# Patient Record
Sex: MALE | Race: WHITE | Employment: UNEMPLOYED | URBAN - METROPOLITAN AREA
[De-identification: names, ages, dates, MRNs, and addresses within clinical notes are randomized per-mention and may not be internally consistent; named-entity substitution may affect disease eponyms.]

---

## 2019-11-14 ENCOUNTER — HOSPITAL ENCOUNTER (INPATIENT)
Facility: CLINIC | Age: 29
LOS: 5 days | Discharge: HOME | End: 2019-11-19
Attending: FAMILY MEDICINE | Admitting: PSYCHIATRY & NEUROLOGY
Payer: MEDICAID

## 2019-11-14 DIAGNOSIS — F19.139 SUBSTANCE ABUSE WITHDRAWAL WITH COMPLICATION (H): Primary | ICD-10-CM

## 2019-11-14 DIAGNOSIS — F11.93 OPIOID WITHDRAWAL (H): ICD-10-CM

## 2019-11-14 LAB
ALBUMIN SERPL-MCNC: 3.6 G/DL (ref 3.4–5)
ALP SERPL-CCNC: 80 U/L (ref 40–150)
ALT SERPL W P-5'-P-CCNC: 18 U/L (ref 0–70)
AMPHETAMINES UR QL SCN: NEGATIVE
ANION GAP SERPL CALCULATED.3IONS-SCNC: 4 MMOL/L (ref 3–14)
APTT PPP: 30 SEC (ref 22–37)
AST SERPL W P-5'-P-CCNC: 11 U/L (ref 0–45)
BARBITURATES UR QL: NEGATIVE
BASOPHILS # BLD AUTO: 0 10E9/L (ref 0–0.2)
BASOPHILS NFR BLD AUTO: 0.2 %
BENZODIAZ UR QL: NEGATIVE
BILIRUB SERPL-MCNC: 0.3 MG/DL (ref 0.2–1.3)
BUN SERPL-MCNC: 15 MG/DL (ref 7–30)
CALCIUM SERPL-MCNC: 9 MG/DL (ref 8.5–10.1)
CANNABINOIDS UR QL SCN: POSITIVE
CHLORIDE SERPL-SCNC: 106 MMOL/L (ref 94–109)
CO2 SERPL-SCNC: 32 MMOL/L (ref 20–32)
COCAINE UR QL: NEGATIVE
CREAT SERPL-MCNC: 0.61 MG/DL (ref 0.66–1.25)
DIFFERENTIAL METHOD BLD: NORMAL
EOSINOPHIL # BLD AUTO: 0.1 10E9/L (ref 0–0.7)
EOSINOPHIL NFR BLD AUTO: 0.5 %
ERYTHROCYTE [DISTWIDTH] IN BLOOD BY AUTOMATED COUNT: 12.9 % (ref 10–15)
ETHANOL UR QL SCN: NEGATIVE
GFR SERPL CREATININE-BSD FRML MDRD: >90 ML/MIN/{1.73_M2}
GGT SERPL-CCNC: 17 U/L (ref 0–75)
GLUCOSE SERPL-MCNC: 106 MG/DL (ref 70–99)
HCT VFR BLD AUTO: 42.7 % (ref 40–53)
HGB BLD-MCNC: 13.7 G/DL (ref 13.3–17.7)
IMM GRANULOCYTES # BLD: 0 10E9/L (ref 0–0.4)
IMM GRANULOCYTES NFR BLD: 0.2 %
INR PPP: 1.11 (ref 0.86–1.14)
LYMPHOCYTES # BLD AUTO: 2.9 10E9/L (ref 0.8–5.3)
LYMPHOCYTES NFR BLD AUTO: 28.1 %
MCH RBC QN AUTO: 28.1 PG (ref 26.5–33)
MCHC RBC AUTO-ENTMCNC: 32.1 G/DL (ref 31.5–36.5)
MCV RBC AUTO: 88 FL (ref 78–100)
MONOCYTES # BLD AUTO: 0.9 10E9/L (ref 0–1.3)
MONOCYTES NFR BLD AUTO: 8.9 %
NEUTROPHILS # BLD AUTO: 6.4 10E9/L (ref 1.6–8.3)
NEUTROPHILS NFR BLD AUTO: 62.1 %
NRBC # BLD AUTO: 0 10*3/UL
NRBC BLD AUTO-RTO: 0 /100
OPIATES UR QL SCN: POSITIVE
PLATELET # BLD AUTO: 291 10E9/L (ref 150–450)
POTASSIUM SERPL-SCNC: 4.7 MMOL/L (ref 3.4–5.3)
PROT SERPL-MCNC: 7.2 G/DL (ref 6.8–8.8)
RBC # BLD AUTO: 4.87 10E12/L (ref 4.4–5.9)
SODIUM SERPL-SCNC: 142 MMOL/L (ref 133–144)
TSH SERPL DL<=0.005 MIU/L-ACNC: 0.96 MU/L (ref 0.4–4)
WBC # BLD AUTO: 10.3 10E9/L (ref 4–11)

## 2019-11-14 PROCEDURE — 12800008 ZZH R&B CD ADULT

## 2019-11-14 PROCEDURE — G0499 HEPB SCREEN HIGH RISK INDIV: HCPCS | Performed by: FAMILY MEDICINE

## 2019-11-14 PROCEDURE — 80053 COMPREHEN METABOLIC PANEL: CPT | Performed by: FAMILY MEDICINE

## 2019-11-14 PROCEDURE — 80307 DRUG TEST PRSMV CHEM ANLYZR: CPT | Performed by: FAMILY MEDICINE

## 2019-11-14 PROCEDURE — 86803 HEPATITIS C AB TEST: CPT | Performed by: FAMILY MEDICINE

## 2019-11-14 PROCEDURE — 25000132 ZZH RX MED GY IP 250 OP 250 PS 637: Performed by: FAMILY MEDICINE

## 2019-11-14 PROCEDURE — HZ2ZZZZ DETOXIFICATION SERVICES FOR SUBSTANCE ABUSE TREATMENT: ICD-10-PCS | Performed by: PSYCHIATRY & NEUROLOGY

## 2019-11-14 PROCEDURE — 99285 EMERGENCY DEPT VISIT HI MDM: CPT | Mod: Z6 | Performed by: FAMILY MEDICINE

## 2019-11-14 PROCEDURE — 86706 HEP B SURFACE ANTIBODY: CPT | Performed by: FAMILY MEDICINE

## 2019-11-14 PROCEDURE — 87389 HIV-1 AG W/HIV-1&-2 AB AG IA: CPT | Performed by: FAMILY MEDICINE

## 2019-11-14 PROCEDURE — 85610 PROTHROMBIN TIME: CPT | Performed by: FAMILY MEDICINE

## 2019-11-14 PROCEDURE — 99285 EMERGENCY DEPT VISIT HI MDM: CPT | Mod: 25 | Performed by: FAMILY MEDICINE

## 2019-11-14 PROCEDURE — 82977 ASSAY OF GGT: CPT | Performed by: FAMILY MEDICINE

## 2019-11-14 PROCEDURE — 85730 THROMBOPLASTIN TIME PARTIAL: CPT | Performed by: FAMILY MEDICINE

## 2019-11-14 PROCEDURE — 80320 DRUG SCREEN QUANTALCOHOLS: CPT | Performed by: FAMILY MEDICINE

## 2019-11-14 PROCEDURE — 85025 COMPLETE CBC W/AUTO DIFF WBC: CPT | Performed by: FAMILY MEDICINE

## 2019-11-14 PROCEDURE — 84443 ASSAY THYROID STIM HORMONE: CPT | Performed by: FAMILY MEDICINE

## 2019-11-14 RX ORDER — BUPRENORPHINE 2 MG/1
4 TABLET SUBLINGUAL
Status: COMPLETED | OUTPATIENT
Start: 2019-11-14 | End: 2019-11-15

## 2019-11-14 RX ORDER — ONDANSETRON 4 MG/1
4 TABLET, ORALLY DISINTEGRATING ORAL EVERY 6 HOURS PRN
Status: DISCONTINUED | OUTPATIENT
Start: 2019-11-14 | End: 2019-11-19 | Stop reason: HOSPADM

## 2019-11-14 RX ORDER — NICOTINE 21 MG/24HR
1 PATCH, TRANSDERMAL 24 HOURS TRANSDERMAL DAILY
Status: DISCONTINUED | OUTPATIENT
Start: 2019-11-15 | End: 2019-11-19 | Stop reason: HOSPADM

## 2019-11-14 RX ORDER — NICOTINE 21 MG/24HR
1 PATCH, TRANSDERMAL 24 HOURS TRANSDERMAL ONCE
Status: COMPLETED | OUTPATIENT
Start: 2019-11-14 | End: 2019-11-14

## 2019-11-14 RX ORDER — HYDROXYZINE HYDROCHLORIDE 25 MG/1
25 TABLET, FILM COATED ORAL EVERY 4 HOURS PRN
Status: DISCONTINUED | OUTPATIENT
Start: 2019-11-14 | End: 2019-11-19 | Stop reason: HOSPADM

## 2019-11-14 RX ORDER — ALUMINA, MAGNESIA, AND SIMETHICONE 2400; 2400; 240 MG/30ML; MG/30ML; MG/30ML
30 SUSPENSION ORAL EVERY 4 HOURS PRN
Status: DISCONTINUED | OUTPATIENT
Start: 2019-11-14 | End: 2019-11-19 | Stop reason: HOSPADM

## 2019-11-14 RX ORDER — BISACODYL 10 MG
10 SUPPOSITORY, RECTAL RECTAL DAILY PRN
Status: DISCONTINUED | OUTPATIENT
Start: 2019-11-14 | End: 2019-11-19 | Stop reason: HOSPADM

## 2019-11-14 RX ORDER — TRAZODONE HYDROCHLORIDE 50 MG/1
50 TABLET, FILM COATED ORAL
Status: DISCONTINUED | OUTPATIENT
Start: 2019-11-14 | End: 2019-11-19 | Stop reason: HOSPADM

## 2019-11-14 RX ORDER — IBUPROFEN 600 MG/1
600 TABLET, FILM COATED ORAL EVERY 6 HOURS PRN
Status: DISCONTINUED | OUTPATIENT
Start: 2019-11-14 | End: 2019-11-19 | Stop reason: HOSPADM

## 2019-11-14 RX ORDER — CLONIDINE HYDROCHLORIDE 0.1 MG/1
0.1 TABLET ORAL 4 TIMES DAILY PRN
Status: DISCONTINUED | OUTPATIENT
Start: 2019-11-14 | End: 2019-11-19 | Stop reason: HOSPADM

## 2019-11-14 RX ORDER — ACETAMINOPHEN 325 MG/1
650 TABLET ORAL EVERY 4 HOURS PRN
Status: DISCONTINUED | OUTPATIENT
Start: 2019-11-14 | End: 2019-11-19 | Stop reason: HOSPADM

## 2019-11-14 RX ADMIN — NICOTINE 1 PATCH: 21 PATCH, EXTENDED RELEASE TRANSDERMAL at 15:45

## 2019-11-14 ASSESSMENT — ACTIVITIES OF DAILY LIVING (ADL)
RETIRED_COMMUNICATION: 0-->UNDERSTANDS/COMMUNICATES WITHOUT DIFFICULTY
TRANSFERRING: 0-->INDEPENDENT
COGNITION: 0 - NO COGNITION ISSUES REPORTED
SWALLOWING: 0-->SWALLOWS FOODS/LIQUIDS WITHOUT DIFFICULTY
RETIRED_EATING: 0-->INDEPENDENT
BATHING: 0-->INDEPENDENT
DRESS: 0-->INDEPENDENT
TOILETING: 0-->INDEPENDENT
NUMBER_OF_TIMES_PATIENT_HAS_FALLEN_WITHIN_LAST_SIX_MONTHS: 1
AMBULATION: 0-->INDEPENDENT
FALL_HISTORY_WITHIN_LAST_SIX_MONTHS: YES

## 2019-11-14 ASSESSMENT — ENCOUNTER SYMPTOMS
BLOOD IN STOOL: 0
DIARRHEA: 1
FEVER: 0
COLOR CHANGE: 1
CONFUSION: 0
ACTIVITY CHANGE: 1
DYSPHORIC MOOD: 0
ABDOMINAL PAIN: 0
BRUISES/BLEEDS EASILY: 0
RHINORRHEA: 1
HALLUCINATIONS: 0
SHORTNESS OF BREATH: 0
DECREASED CONCENTRATION: 0
APPETITE CHANGE: 1
NAUSEA: 1

## 2019-11-14 NOTE — ED NOTES
ED to Behavioral Floor Handoff    SITUATION  Bebeto Alvarado is a 28 year old male who speaks English and lives in a home with family members The patient arrived in the ED by private car from home with a complaint of Addiction Problem (Pt had a bed on hold for detox. Intake has been notified pt is in ED. Pt uses Heroin IV 1/2 gram daily. Last use: last night at midnight. )  .The patient's current symptoms started/worsened 3 month(s) ago and during this time the symptoms have increased.   In the ED, pt was diagnosed with   Final diagnoses:   None        Initial vitals were: BP: 122/55  Pulse: 87  Temp: 98.3  F (36.8  C)  Resp: 16  Weight: 68.9 kg (151 lb 12.8 oz)  SpO2: 97 %   --------  Is the patient diabetic? No   If yes, last blood glucose? --     If yes, was this treated in the ED? --  --------  Is the patient inebriated (ETOH) No or Impaired on other substances? No  MSSA done? N/A  Last MSSA score: --    Were withdrawal symptoms treated? N/A  Does the patient have a seizure history? No. If yes, date of most recent seizure--  --------  Is the patient patient experiencing suicidal ideation? denies current or recent suicidal ideation     Homicidal ideation? denies current or recent homicidal ideation or behaviors.    Self-injurious behavior/urges? denies current or recent self injurious behavior or ideation.  ------  Was pt aggressive in the ED No  Was a code called No  Is the pt now cooperative? Yes  -------  Meds given in ED:   Medications   nicotine (NICODERM CQ) 21 MG/24HR 24 hr patch 1 patch (1 patch Transdermal Given 11/14/19 4197)      Family present during ED course? No  Family currently present? No    BACKGROUND  Does the patient have a cognitive impairment or developmental disability? No  Allergies:   Allergies   Allergen Reactions     Codeine Unknown     Pt has been told.   .   Social demographics are   Social History     Socioeconomic History     Marital status: Single     Spouse name: None      Number of children: None     Years of education: None     Highest education level: None   Occupational History     None   Social Needs     Financial resource strain: None     Food insecurity:     Worry: None     Inability: None     Transportation needs:     Medical: None     Non-medical: None   Tobacco Use     Smoking status: Current Every Day Smoker     Smokeless tobacco: Never Used   Substance and Sexual Activity     Alcohol use: Not Currently     Drug use: Yes     Types: IV, Opiates, Marijuana     Sexual activity: None   Lifestyle     Physical activity:     Days per week: None     Minutes per session: None     Stress: None   Relationships     Social connections:     Talks on phone: None     Gets together: None     Attends Taoism service: None     Active member of club or organization: None     Attends meetings of clubs or organizations: None     Relationship status: None     Intimate partner violence:     Fear of current or ex partner: None     Emotionally abused: None     Physically abused: None     Forced sexual activity: None   Other Topics Concern     None   Social History Narrative     None        ASSESSMENT  Labs results   Labs Ordered and Resulted from Time of ED Arrival Up to the Time of Departure from the ED   DRUG ABUSE SCREEN 6 CHEM DEP URINE ([facility]) - Abnormal; Notable for the following components:       Result Value    Cannabinoids Qual Urine Positive (*)     Opiates Qualitative Urine Positive (*)     All other components within normal limits   COMPREHENSIVE METABOLIC PANEL - Abnormal; Notable for the following components:    Glucose 106 (*)     Creatinine 0.61 (*)     All other components within normal limits   CBC WITH PLATELETS DIFFERENTIAL   PARTIAL THROMBOPLASTIN TIME   INR   TSH      Imaging Studies: No results found for this or any previous visit (from the past 24 hour(s)).   Most recent vital signs /55   Pulse 87   Temp 98.3  F (36.8  C) (Oral)   Resp 16   Wt 68.9 kg (151 lb 12.8  oz)   SpO2 97%    Abnormal labs/tests/findings requiring intervention:---   Pain control: fair  Nausea control: good    RECOMMENDATION  Are any infection precautions needed (MRSA, VRE, etc.)? No If yes, what infection? --  ---  Does the patient have mobility issues? independently. If yes, what device does the pt use? ---  ---  Is patient on 72 hour hold or commitment? No If on 72 hour hold, have hold and rights been given to patient? N/A  Are admitting orders written if after 10 p.m. ?N/A  Tasks needing to be completed:---     Melisa Nascimento RN   ascom-- n/a   [phone] [place] [facility]   [phone] [facility]

## 2019-11-14 NOTE — ED PROVIDER NOTES
"    [facility] EMERGENCY DEPARTMENT ([facility])    11/14/19     ED 12 1:12 PM   History     Chief Complaint   Patient presents with     Addiction Problem     Pt had a bed on hold for detox. Intake has been notified pt is in ED. Pt uses Heroin IV 1/2 gram daily. Last use: last night at midnight.      The history is provided by the patient, medical records and a parent.     Bebeto Alvarado is a 28 year old male who presents seeking heroin detox. Mother states he went to [facility] a year ago, had heroin detox there. He states they accused him of going to different places, accused him of using and denies that any of this happened. He relapsed as soon as he got out of [place] and has been using 1/2 a gram of heroin IV daily, last used last night.  He has been injecting in the veins of his antecubital forearms.  He denies any active infections, redness or swelling though has some old bumps over prior infected sites. He is withdrawing and feeling \"like crap\" right now with nausea, goosebumps, diarrhea, generalized malaise. He has had cough. He smokes cigarettes as well, though no other substances. He is otherwise healthy. No other physical symptoms at this time.  Patient has bed on hold for detox.     I have reviewed the Medications, Allergies, Past Medical and Surgical History, and Social History in the [employer] system.  History reviewed. No pertinent past medical history.    History reviewed. No pertinent surgical history.    History reviewed. No pertinent family history.    Social History     Tobacco Use     Smoking status: Current Every Day Smoker     Smokeless tobacco: Never Used   Substance Use Topics     Alcohol use: Not Currently      Review of Systems   Constitutional: Positive for activity change and appetite change. Negative for fever.        Generalized malaise   HENT: Positive for rhinorrhea.    Respiratory: Negative for shortness of breath.    Cardiovascular: Negative for chest pain. " "  Gastrointestinal: Positive for diarrhea and nausea. Negative for abdominal pain and blood in stool.   Skin: Positive for color change. Negative for rash.        Bilateral antecubital sites injection noted without concerns of infection   Hematological: Does not bruise/bleed easily.   Psychiatric/Behavioral: Negative for confusion, decreased concentration, dysphoric mood, hallucinations and suicidal ideas.   All other systems reviewed and are negative.      Physical Exam   BP: 122/55  Pulse: 87  Temp: 98.3  F (36.8  C)  Resp: 16  Height: 170.2 cm (5' 7\")  Weight: 68.9 kg (151 lb 12.8 oz)  SpO2: 97 %      Physical Exam  Vitals signs and nursing note reviewed.   Constitutional:       General: He is in acute distress.      Appearance: He is well-developed. He is not diaphoretic.      Comments: Patient here with mother is cooperative otherwise slightly uncomfortable but does not appear toxic.   HENT:      Head: Normocephalic and atraumatic.      Nose: Rhinorrhea present.   Eyes:      General: No scleral icterus.     Extraocular Movements: Extraocular movements intact.      Conjunctiva/sclera: Conjunctivae normal.      Pupils: Pupils are equal, round, and reactive to light.   Neck:      Musculoskeletal: Normal range of motion and neck supple.   Cardiovascular:      Heart sounds: Normal heart sounds.   Abdominal:      General: There is no distension.      Palpations: There is no mass.      Tenderness: There is no abdominal tenderness.   Skin:     General: Skin is warm and dry.      Capillary Refill: Capillary refill takes less than 2 seconds.      Findings: No rash.      Comments: Bilateral antecubital injection sites with just slight sclerosis of the veins but no cellulitis abscess fluctuance etc.   Neurological:      General: No focal deficit present.      Mental Status: He is alert and oriented to person, place, and time.   Psychiatric:      Comments: Mildly flattened otherwise appropriate         ED Course   Patient " valuate here in the ER drug screen positive for opiates and THC.  Otherwise been voluntary no other eventually did this point.  We do a bed for patient to be admitted to 3 a detox for opiate dependence with withdrawal.     Procedures    Results for orders placed or performed during the hospital encounter of 11/14/19 (from the past 24 hour(s))   Drug abuse screen 6 urine (tox)   Result Value Ref Range    Amphetamine Qual Urine Negative NEG^Negative    Barbiturates Qual Urine Negative NEG^Negative    Benzodiazepine Qual Urine Negative NEG^Negative    Cannabinoids Qual Urine Positive (A) NEG^Negative    Cocaine Qual Urine Negative NEG^Negative    Ethanol Qual Urine Negative NEG^Negative    Opiates Qualitative Urine Positive (A) NEG^Negative   CBC with platelets differential   Result Value Ref Range    WBC 10.3 4.0 - 11.0 10e9/L    RBC Count 4.87 4.4 - 5.9 10e12/L    Hemoglobin 13.7 13.3 - 17.7 g/dL    Hematocrit 42.7 40.0 - 53.0 %    MCV 88 78 - 100 fl    MCH 28.1 26.5 - 33.0 pg    MCHC 32.1 31.5 - 36.5 g/dL    RDW 12.9 10.0 - 15.0 %    Platelet Count 291 150 - 450 10e9/L    Diff Method Automated Method     % Neutrophils 62.1 %    % Lymphocytes 28.1 %    % Monocytes 8.9 %    % Eosinophils 0.5 %    % Basophils 0.2 %    % Immature Granulocytes 0.2 %    Nucleated RBCs 0 0 /100    Absolute Neutrophil 6.4 1.6 - 8.3 10e9/L    Absolute Lymphocytes 2.9 0.8 - 5.3 10e9/L    Absolute Monocytes 0.9 0.0 - 1.3 10e9/L    Absolute Eosinophils 0.1 0.0 - 0.7 10e9/L    Absolute Basophils 0.0 0.0 - 0.2 10e9/L    Abs Immature Granulocytes 0.0 0 - 0.4 10e9/L    Absolute Nucleated RBC 0.0    Partial thromboplastin time   Result Value Ref Range    PTT 30 22 - 37 sec   INR   Result Value Ref Range    INR 1.11 0.86 - 1.14   Comprehensive metabolic panel   Result Value Ref Range    Sodium 142 133 - 144 mmol/L    Potassium 4.7 3.4 - 5.3 mmol/L    Chloride 106 94 - 109 mmol/L    Carbon Dioxide 32 20 - 32 mmol/L    Anion Gap 4 3 - 14 mmol/L     Glucose 106 (H) 70 - 99 mg/dL    Urea Nitrogen 15 7 - 30 mg/dL    Creatinine 0.61 (L) 0.66 - 1.25 mg/dL    GFR Estimate >90 >60 mL/min/[1.73_m2]    GFR Estimate If Black >90 >60 mL/min/[1.73_m2]    Calcium 9.0 8.5 - 10.1 mg/dL    Bilirubin Total 0.3 0.2 - 1.3 mg/dL    Albumin 3.6 3.4 - 5.0 g/dL    Protein Total 7.2 6.8 - 8.8 g/dL    Alkaline Phosphatase 80 40 - 150 U/L    ALT 18 0 - 70 U/L    AST 11 0 - 45 U/L   TSH   Result Value Ref Range    TSH 0.96 0.40 - 4.00 mU/L   GGT   Result Value Ref Range    GGT 17 0 - 75 U/L     Medications   hydrOXYzine (ATARAX) tablet 25 mg (has no administration in time range)   nicotine Patch in Place (has no administration in time range)   nicotine patch REMOVAL (has no administration in time range)   nicotine (NICODERM CQ) 21 MG/24HR 24 hr patch 1 patch (has no administration in time range)   acetaminophen (TYLENOL) tablet 650 mg (has no administration in time range)   alum & mag hydroxide-simethicone (MYLANTA ES/MAALOX  ES) suspension 30 mL (has no administration in time range)   magnesium hydroxide (MILK OF MAGNESIA) suspension 30 mL (has no administration in time range)   bisacodyl (DULCOLAX) Suppository 10 mg (has no administration in time range)   traZODone (DESYREL) tablet 50 mg (has no administration in time range)   ibuprofen (ADVIL/MOTRIN) tablet 600 mg (has no administration in time range)   ondansetron (ZOFRAN-ODT) ODT tab 4 mg (has no administration in time range)   cloNIDine (CATAPRES) tablet 0.1 mg (has no administration in time range)   buprenorphine (SUBUTEX) sublingual tablet 4 mg (has no administration in time range)   nicotine (NICODERM CQ) 21 MG/24HR 24 hr patch 1 patch (1 patch Transdermal Given 11/14/19 1325)         Assessments & Plan (with Medical Decision Making)  28-year-old male history of IV heroin use presents the ER for detox.  Patient uses half a gram a day.  Went through treatment a year ago but was not successful.  Patient with mother.  Patient  seeking detox for opiate withdrawal.  Last used last night.  Patient has some mild withdrawal symptoms this point otherwise cooperative not suicidal homicidal not delusional will be admitted to 3 a detox.         I have reviewed the nursing notes.    I have reviewed the findings, diagnosis, plan and need for follow up with the patient.    There are no discharge medications for this patient.      Final diagnoses:   None     I, Yolanda Park, am serving as a trained medical scribe to document services personally performed by Kwame Blake MD based on the provider's statements to me on November 14, 2019.  This document has been checked and approved by the attending provider.    I, Kwame Blake MD, was physically present and have reviewed and verified the accuracy of this note documented by Yolanda Park, medical scribe.     11/14/2019   [facility], [place], EMERGENCY DEPARTMENT    This note was created at least in part by the use of dragon voice dictation system. Inadvertent typographical errors may still exist.  Kwame Blake MD.         Kwame Blake MD  11/14/19 1942

## 2019-11-14 NOTE — PROGRESS NOTES
11/14/19 1649   Patient Belongings   Did you bring any home meds/supplements to the hospital?  Yes   Disposition of meds  Sent to security/pharmacy per site process   Patient Belongings other (see comments)  (storage bin, discharge bin, security)   Belongings Search Yes   Clothing Search Yes   Second Staff Time, Oscar   storage bin: jacket, cap, belt, cigarettes, 2 lighters, clothing items  Discharge bin: cell phone, wallet (no cash)  [id number]: social security card, [place], visa, [place]card  [id number]  A               Admission:  I am responsible for any personal items that are not sent to the safe or pharmacy.  [facility] is not responsible for loss, theft or damage of any property in my possession.    Signature:  _________________________________ Date: _______  Time: _____                                              Staff Signature:  ____________________________ Date: ________  Time: _____      2nd Staff person, if patient is unable/unwilling to sign:    Signature: ________________________________ Date: ________  Time: _____     Discharge:  [facility] has returned all of my personal belongings:    Signature: _________________________________ Date: ________  Time: _____                                          Staff Signature:  ____________________________ Date: ________  Time: _____

## 2019-11-14 NOTE — PHARMACY-ADMISSION MEDICATION HISTORY
Admission medication history interview status for the 11/14/2019 admission is complete. See Epic admission navigator for allergy information, pharmacy, prior to admission medications and immunization status.     Medication history interview sources:  patient, chart review, Care Everywhere review    Changes made to PTA medication list (reason)  Added: none  Deleted: none  Changed: none    Additional medication history information (including reliability of information, actions taken by pharmacist):  -Patient reports taking no prescription meds, OTC meds, PRN meds, supplements, vitamins, etc.  -Patient reports buying Suboxone off the street, but has not taken any today.    Prior to Admission medications    Not on File     Medication history completed by: Jasmin Tovar, PharmD, BCPS

## 2019-11-15 LAB
CHOLEST SERPL-MCNC: 168 MG/DL
HBV SURFACE AB SERPL IA-ACNC: 2.57 M[IU]/ML
HBV SURFACE AG SERPL QL IA: NONREACTIVE
HCV AB SERPL QL IA: NONREACTIVE
HDLC SERPL-MCNC: 36 MG/DL
HIV 1+2 AB+HIV1 P24 AG SERPL QL IA: NONREACTIVE
LDLC SERPL CALC-MCNC: 110 MG/DL
NONHDLC SERPL-MCNC: 132 MG/DL
TRIGL SERPL-MCNC: 109 MG/DL

## 2019-11-15 PROCEDURE — 99207 ZZC CONSULT E&M CHANGED TO INITIAL LEVEL: CPT | Performed by: PHYSICIAN ASSISTANT

## 2019-11-15 PROCEDURE — 99222 1ST HOSP IP/OBS MODERATE 55: CPT | Mod: AI | Performed by: PSYCHIATRY & NEUROLOGY

## 2019-11-15 PROCEDURE — H2032 ACTIVITY THERAPY, PER 15 MIN: HCPCS

## 2019-11-15 PROCEDURE — 99221 1ST HOSP IP/OBS SF/LOW 40: CPT | Performed by: PHYSICIAN ASSISTANT

## 2019-11-15 PROCEDURE — 86706 HEP B SURFACE ANTIBODY: CPT | Performed by: PHYSICIAN ASSISTANT

## 2019-11-15 PROCEDURE — 80061 LIPID PANEL: CPT | Performed by: PSYCHIATRY & NEUROLOGY

## 2019-11-15 PROCEDURE — 36415 COLL VENOUS BLD VENIPUNCTURE: CPT | Performed by: PSYCHIATRY & NEUROLOGY

## 2019-11-15 PROCEDURE — 25000132 ZZH RX MED GY IP 250 OP 250 PS 637: Performed by: NURSE PRACTITIONER

## 2019-11-15 PROCEDURE — 12800008 ZZH R&B CD ADULT

## 2019-11-15 PROCEDURE — 25000132 ZZH RX MED GY IP 250 OP 250 PS 637: Performed by: PSYCHIATRY & NEUROLOGY

## 2019-11-15 RX ORDER — NALOXONE HYDROCHLORIDE 0.4 MG/ML
.1-.4 INJECTION, SOLUTION INTRAMUSCULAR; INTRAVENOUS; SUBCUTANEOUS
Status: DISCONTINUED | OUTPATIENT
Start: 2019-11-15 | End: 2019-11-19 | Stop reason: HOSPADM

## 2019-11-15 RX ORDER — BUPRENORPHINE 2 MG/1
4 TABLET SUBLINGUAL 2 TIMES DAILY
Status: DISCONTINUED | OUTPATIENT
Start: 2019-11-15 | End: 2019-11-19 | Stop reason: HOSPADM

## 2019-11-15 RX ADMIN — NICOTINE 1 PATCH: 21 PATCH, EXTENDED RELEASE TRANSDERMAL at 09:17

## 2019-11-15 RX ADMIN — CLONIDINE HYDROCHLORIDE 0.1 MG: 0.1 TABLET ORAL at 16:50

## 2019-11-15 RX ADMIN — HYDROXYZINE HYDROCHLORIDE 25 MG: 25 TABLET, FILM COATED ORAL at 16:50

## 2019-11-15 RX ADMIN — BUPRENORPHINE HCL 4 MG: 2 TABLET SUBLINGUAL at 12:37

## 2019-11-15 RX ADMIN — IBUPROFEN 600 MG: 600 TABLET ORAL at 16:50

## 2019-11-15 RX ADMIN — BUPRENORPHINE HCL 4 MG: 2 TABLET SUBLINGUAL at 20:22

## 2019-11-15 ASSESSMENT — ACTIVITIES OF DAILY LIVING (ADL)
DRESS: INDEPENDENT
HYGIENE/GROOMING: INDEPENDENT
ORAL_HYGIENE: INDEPENDENT

## 2019-11-15 NOTE — PLAN OF CARE
Continues in detox status on opiate withdrawal protocol. Cows scores 2 and 9. Buprenorphine started per order. Spent most of the day in lounge socializing with peers. Appetite good. Denies suicide ideation and thoughts of self harm.

## 2019-11-15 NOTE — PLAN OF CARE
"DAVIN Tate \"Tavo\" Christiano 28/M, arrived 16:30 from [place] ED    S = Situation:   Voluntary admit for opiate withdrawal     B  = Background:     Pt states using 0.5-1 gram heroine IV daily. Pt stated dosage depended on how much money he had at the time of purchase. Last use was \"early morning\" on 11/14    Pt injects into Left and Right forearms antecubital.      This is Pt's first admit for detox. Pt was at [facility] October 2019 for treatment.     Alcohol use: occasional, UTOX negative  THC use: occasional, last use on 11/13/19, UTOX positive    A  =  Assessment:   COWS = 1 upon arrival, VS stable upon arrival    Pt denies SI, SIB; calm, pleasant and cooperative with search and intake    No signs of infection at injection sites.    Allergy: Codeine, pharmacy stated OK to order subutex; Pt had suboxone film in belongings, stated not having a prescription for it, states taking it when he needed it      R =   Request or Recommendation:     COWS assessment, withdrawal precautions    Subutex 4 mg ordered: Once PRN for COWS of 9 or greater  Ibuprofen 600 mg PRN  Zofran PRN  Clonidine 0.1 PRN  Comfort meds    "

## 2019-11-15 NOTE — H&P
"[facility], [place]   Psychiatric History and Physical  Admission date: 11/14/2019        Chief Complaint:   \"I'm okay.\"         HPI:     The patient is a 27yo male with a history of opiate use disorder who was admitted to detoxify from heroin. Has been on Suboxone before and is hopeful to return to maintenance. Says that his last use was between \"10 and noon\" yesterday and is feeling some withdrawal. Mood is \"a little depressed.\" Denies SI or HI. Denies AH or VH. Sleeping well and denies nausea or vomiting. Hopeful for [facility] or \"anywhere.\"      Per ER: Bebeto Alvarado is a 28 year old male who presents seeking heroin detox. Mother states he went to [facility] a year ago, had heroin detox there. He states they accused him of going to different places, accused him of using and denies that any of this happened. He relapsed as soon as he got out of [place] and has been using 1/2 a gram of heroin IV daily, last used last night.  He has been injecting in the veins of his antecubital forearms.  He denies any active infections, redness or swelling though has some old bumps over prior infected sites. He is withdrawing and feeling \"like crap\" right now with nausea, goosebumps, diarrhea, generalized malaise. He has had cough. He smokes cigarettes as well, though no other substances. He is otherwise healthy. No other physical symptoms at this time.  Patient has bed on hold for detox.            Past Psychiatric History:     No history of suicide attempts.         Substance Use and History:     Opiate use disorder.   First detox. Was at [facility] in October 2018.         Past Medical History:   PAST MEDICAL HISTORY: History reviewed. No pertinent past medical history.    PAST SURGICAL HISTORY: History reviewed. No pertinent surgical history.          Family History:   FAMILY HISTORY: History reviewed. No pertinent family history.        Social History:   Please see the full psychosocial " profile from the clinical treatment coordinator.   SOCIAL HISTORY:   Social History     Tobacco Use     Smoking status: Current Every Day Smoker     Smokeless tobacco: Never Used   Substance Use Topics     Alcohol use: Not Currently            Physical ROS:   The 10-point review of systems was negative except as noted in HPI.         PTA Medications:     No medications prior to admission.          Allergies:     Allergies   Allergen Reactions     Codeine Unknown     Pt has been told.          Labs:     Recent Results (from the past 48 hour(s))   Drug abuse screen 6 urine (tox)    Collection Time: 11/14/19  1:06 PM   Result Value Ref Range    Amphetamine Qual Urine Negative NEG^Negative    Barbiturates Qual Urine Negative NEG^Negative    Benzodiazepine Qual Urine Negative NEG^Negative    Cannabinoids Qual Urine Positive (A) NEG^Negative    Cocaine Qual Urine Negative NEG^Negative    Ethanol Qual Urine Negative NEG^Negative    Opiates Qualitative Urine Positive (A) NEG^Negative   CBC with platelets differential    Collection Time: 11/14/19  1:32 PM   Result Value Ref Range    WBC 10.3 4.0 - 11.0 10e9/L    RBC Count 4.87 4.4 - 5.9 10e12/L    Hemoglobin 13.7 13.3 - 17.7 g/dL    Hematocrit 42.7 40.0 - 53.0 %    MCV 88 78 - 100 fl    MCH 28.1 26.5 - 33.0 pg    MCHC 32.1 31.5 - 36.5 g/dL    RDW 12.9 10.0 - 15.0 %    Platelet Count 291 150 - 450 10e9/L    Diff Method Automated Method     % Neutrophils 62.1 %    % Lymphocytes 28.1 %    % Monocytes 8.9 %    % Eosinophils 0.5 %    % Basophils 0.2 %    % Immature Granulocytes 0.2 %    Nucleated RBCs 0 0 /100    Absolute Neutrophil 6.4 1.6 - 8.3 10e9/L    Absolute Lymphocytes 2.9 0.8 - 5.3 10e9/L    Absolute Monocytes 0.9 0.0 - 1.3 10e9/L    Absolute Eosinophils 0.1 0.0 - 0.7 10e9/L    Absolute Basophils 0.0 0.0 - 0.2 10e9/L    Abs Immature Granulocytes 0.0 0 - 0.4 10e9/L    Absolute Nucleated RBC 0.0    Partial thromboplastin time    Collection Time: 11/14/19  1:32 PM   Result  "Value Ref Range    PTT 30 22 - 37 sec   INR    Collection Time: 11/14/19  1:32 PM   Result Value Ref Range    INR 1.11 0.86 - 1.14   Comprehensive metabolic panel    Collection Time: 11/14/19  1:32 PM   Result Value Ref Range    Sodium 142 133 - 144 mmol/L    Potassium 4.7 3.4 - 5.3 mmol/L    Chloride 106 94 - 109 mmol/L    Carbon Dioxide 32 20 - 32 mmol/L    Anion Gap 4 3 - 14 mmol/L    Glucose 106 (H) 70 - 99 mg/dL    Urea Nitrogen 15 7 - 30 mg/dL    Creatinine 0.61 (L) 0.66 - 1.25 mg/dL    GFR Estimate >90 >60 mL/min/[1.73_m2]    GFR Estimate If Black >90 >60 mL/min/[1.73_m2]    Calcium 9.0 8.5 - 10.1 mg/dL    Bilirubin Total 0.3 0.2 - 1.3 mg/dL    Albumin 3.6 3.4 - 5.0 g/dL    Protein Total 7.2 6.8 - 8.8 g/dL    Alkaline Phosphatase 80 40 - 150 U/L    ALT 18 0 - 70 U/L    AST 11 0 - 45 U/L   TSH    Collection Time: 11/14/19  1:32 PM   Result Value Ref Range    TSH 0.96 0.40 - 4.00 mU/L   GGT    Collection Time: 11/14/19  1:32 PM   Result Value Ref Range    GGT 17 0 - 75 U/L          Physical and Psychiatric Examination:     /65   Pulse 64   Temp 98.2  F (36.8  C) (Oral)   Resp 16   Ht 1.702 m (5' 7\")   Wt 68.9 kg (151 lb 12.8 oz)   SpO2 99%   BMI 23.78 kg/m    Weight is 151 lbs 12.8 oz  Body mass index is 23.78 kg/m .    Physical Exam:  I have reviewed the physical exam as documented by by the medical team and agree with findings and assessment and have no additional findings to add at this time.    Mental Status Exam:  Appearance: awake, alert and adequately groomed  Attitude:  cooperative  Eye Contact:  good  Mood:  depressed  Affect:  mood congruent  Speech:  clear, coherent  Language: fluent and intact in English  Psychomotor, Gait, Musculoskeletal:  no evidence of tardive dyskinesia, dystonia, or tics  Thought Process:  goal oriented  Associations:  no loose associations  Thought Content:  no evidence of suicidal ideation or homicidal ideation and no evidence of psychotic thought  Insight:  " fair  Judgement:  intact  Oriented to:  time, person, and place  Attention Span and Concentration:  intact  Recent and Remote Memory:  fair  Fund of Knowledge:  appropriate         Admission Diagnoses:   Opiate use disorder with withdrawal with unspecified complication         Assessment & Plan:     1) Subutex 4mg BID started after patient entered withdrawal. Will transition to Suboxone at discharge.     Disposition Plan   Reason for ongoing admission: requires detoxification from substance that poses a risk of bodily harm during withdrawal period  Discharge location: Chemical dependency treatment facility  Discharge Medications: not ordered  Follow-up Appointments: not scheduled  Legal Status: voluntary  Entered by: Yinka Winston on 11/15/2019 at 5:23 AM

## 2019-11-15 NOTE — PLAN OF CARE
Behavioral Team Discussion: (11/15/2019)    Continued Stay Criteria/Rationale: Patient admitted for opiate withdrawal and opiate Use Disorder.  Plan: The following services will be provided to the patient; psychiatric assessment, medication management, therapeutic milieu, individual and group support, and skills groups.   Participants: 3A Provider: Dr. Yinka Winston MD; 3A RN's: Sabi Null, RN; 3A CM's: Alina Perrin .  Summary/Recommendation: Providers will assess today for treatment recommendations, discharge planning, and aftercare plans. CM will meet with pt for discharge planning.   Medical/Physical: None noted  Precautions:   Behavioral Orders   Procedures     Code 1 - Restrict to Unit     Routine Programming     As clinically indicated     Status 15     Every 15 minutes.     Withdrawal precautions     Rationale for change in precautions or plan: N/A  Progress: No Change.

## 2019-11-15 NOTE — PROGRESS NOTES
Met with Pt to initiate discharge planning.  Pt is requesting referral to treatment and Suboxone maintenance.  Pt has no insurance and will require [facility] funding for treatment.  Business office notified and assistance with MA application requested.  Coached Pt to complete paperwork for assessment.  Pt acknowledged.  Weekend  to complete assessment and addendum.  Referral to [facility] made for screening.  Pt will have straight MA and once open may be referred to [place] [facility] for maintenance.

## 2019-11-15 NOTE — CONSULTS
Internal Medicine Initial Visit      Bebeto Alvarado MRN# [id number]   YOB: 1990 Age: 28 year old   Date of Admission: 11/14/2019  PCP: No Ref-Primary, Physician    Referring Provider: Behavioral Health - Yinka Winston MD  Reason for Visit: Evaluate for complications from heroin withdrawal         Assessment and Recommendations:   Bebeto Alvarado is a 28 year old male with a history of opiate dependence who is admitted to station 3[facility] with heroin detoxication. Internal Medicine consultation was ordered by Dr. Winston for evaluate for complications from heroin withdrawal.       Opiate use disorder with acute withdrawal  Cannabis use disorder:  Uses 1/2 to 1 gram of IV heroin daily. Last used 11/13. Injects into bilateral antecubital fossas. Does not share needles but does reuse. No s/sx of injection site infection or abscess. Previously went to [place] last year for treatment. Denies any other illicit drug use other than occasional marijuana use. Vital signs stable on admission. Labs reviewed including CBC and CMP without acute abnormality. GGT negative. TSH WNL.   - Management per psychiatry team.   - HIV, Hep C antibody and Hep B ab/antigen negative   - Please have patient set up appointment/establish care with PCP prior to discharge    Tobacco use:   Smokes 1/2 ppd for ~10 years.    - Encouraged cessation   - Nicotine supplementation prn     Abnormal lipid panel:   Cholesterol 168, , HDL 36 and Triglycerides 109. Unclear if fasting.   - Lifestyle modifications  - Patient will need follow-up fasting lipid panel in 2-4 weeks     No further medical intervention is required at this time. Medicine signing off. Please feel free to call with any questions.       Nallely Baez PA-C  Hospitalist Service   Pager: [phone]     Reason for Admission:   Heroin withdrawal         History of Present Illness:   History is obtained from the patient and medical record.     Bebeto  "Steven Alvarado is a 28 year old male with a history of opiate dependence who is admitted to station 3A with heroin detoxication. Internal Medicine consultation was ordered by Dr. Winston for evaluate for complications from heroin withdrawal.       Patient uses 1/2gram to 1 gram of IV heroin daily for the past year. Last use was yesterday morning. He injects into his bilateral antecubital fossas. No pain, erythema or warmth. He reuses needles occasionally but does not share. Currently he endorses some shakiness and diaphoresis. No fever or chills, chest pain, SOB, abdominal pain, N/V, dysuria, frequency, urgency, constipation, hematochezia, melena, diarrhea or concern for STD. He smokes marijuana occasionally but denies any other illicit drug use. He went to [place] last year for treatment, but was \"kicked out,\" after one week for something he states was not his fault. He is interested in going back to treatment.           Review of Systems:    ROS: 10 point ROS neg other than the symptoms noted above in the HPI.            Past Medical History:   Reviewed and updated in Epic.  Past Medical History:   Diagnosis Date     Opiate abuse, continuous (H)      Tobacco use    Denies any hx of HTN, DM, HLD or lung disease.           Past Surgical History:   Reviewed and updated in Epic.  History reviewed. No pertinent surgical history.          Social History:   Currently unemployed. Previously worked at [employer] up to one week ago. Is couch surfing and sleeping at friends and mother's house.   Social History     Tobacco Use     Smoking status: Current Every Day Smoker     Smokeless tobacco: Never Used   Substance Use Topics     Alcohol use: Not Currently     Drug use: Yes     Types: IV, Opiates, Marijuana             Family History:   Reviewed and updated in Epic.  Family History   Problem Relation Age of Onset     Diabetes Mother      Hypertension Mother      No Known Problems Father      No Known Problems Brother  " "            Allergies:     Allergies   Allergen Reactions     Codeine Unknown     Pt has been told.             Medications:     No medications prior to admission.        Current Facility-Administered Medications   Medication     acetaminophen (TYLENOL) tablet 650 mg     alum & mag hydroxide-simethicone (MYLANTA ES/MAALOX  ES) suspension 30 mL     bisacodyl (DULCOLAX) Suppository 10 mg     buprenorphine (SUBUTEX) sublingual tablet 4 mg     cloNIDine (CATAPRES) tablet 0.1 mg     hydrOXYzine (ATARAX) tablet 25 mg     ibuprofen (ADVIL/MOTRIN) tablet 600 mg     magnesium hydroxide (MILK OF MAGNESIA) suspension 30 mL     naloxone (NARCAN) injection 0.1-0.4 mg     nicotine (NICODERM CQ) 21 MG/24HR 24 hr patch 1 patch     nicotine Patch in Place     nicotine patch REMOVAL     ondansetron (ZOFRAN-ODT) ODT tab 4 mg     traZODone (DESYREL) tablet 50 mg            Physical Exam:   Blood pressure 138/71, pulse 74, temperature 97.6  F (36.4  C), temperature source Oral, resp. rate 16, height 1.702 m (5' 7\"), weight 68.9 kg (151 lb 12.8 oz), SpO2 100 %.  Body mass index is 23.78 kg/m .  GENERAL: Alert and oriented x 3. NAD. Pleasant and cooperative. Scabs to skin. No discharge.   HEENT: Anicteric sclera. Mucous membranes moist.   CV: RRR. S1, S2. No murmurs appreciated.   RESPIRATORY: Effort normal on room air. Lungs CTAB with no wheezing, rales, rhonchi.   GI: Abdomen soft, non distended, non tender. No guarding, rigidity or rebound tenderness.  MUSCULOSKELETAL: No joint swelling or tenderness.  NEUROLOGICAL: No focal deficits. Moves all extremities.   EXTREMITIES: No peripheral edema. Intact bilateral pedal pulses.   SKIN: No jaundice. Injection sites to bilateral antecubital fossas. There is no erythema or warmth or fluctuance. No abscess.           Data:   CBC:  Recent Labs   Lab Test 11/14/19  1332   WBC 10.3   RBC 4.87   HGB 13.7   HCT 42.7   MCV 88   MCH 28.1   MCHC 32.1   RDW 12.9          CMP:  Recent Labs   Lab " Test 11/14/19  1332      POTASSIUM 4.7   CHLORIDE 106   CHAKA 9.0   CO2 32   BUN 15   CR 0.61*   *   AST 11   ALT 18   BILITOTAL 0.3   ALBUMIN 3.6   PROTTOTAL 7.2   ALKPHOS 80       TSH:  TSH   Date Value Ref Range Status   11/14/2019 0.96 0.40 - 4.00 mU/L Final       Tox screen: positive for opiates and cannabinoids     Unresulted Labs Ordered in the Past 30 Days of this Admission     No orders found for last 31 day(s).

## 2019-11-16 PROCEDURE — H2035 A/D TX PROGRAM, PER HOUR: HCPCS | Mod: HQ

## 2019-11-16 PROCEDURE — 12800008 ZZH R&B CD ADULT

## 2019-11-16 PROCEDURE — 25000132 ZZH RX MED GY IP 250 OP 250 PS 637: Performed by: NURSE PRACTITIONER

## 2019-11-16 PROCEDURE — 25000132 ZZH RX MED GY IP 250 OP 250 PS 637: Performed by: PSYCHIATRY & NEUROLOGY

## 2019-11-16 RX ADMIN — BUPRENORPHINE HCL 4 MG: 2 TABLET SUBLINGUAL at 08:32

## 2019-11-16 RX ADMIN — BUPRENORPHINE HCL 4 MG: 2 TABLET SUBLINGUAL at 18:00

## 2019-11-16 RX ADMIN — TRAZODONE HYDROCHLORIDE 50 MG: 50 TABLET ORAL at 21:35

## 2019-11-16 RX ADMIN — CLONIDINE HYDROCHLORIDE 0.1 MG: 0.1 TABLET ORAL at 18:00

## 2019-11-16 RX ADMIN — NICOTINE 1 PATCH: 21 PATCH, EXTENDED RELEASE TRANSDERMAL at 08:31

## 2019-11-16 ASSESSMENT — ACTIVITIES OF DAILY LIVING (ADL)
ORAL_HYGIENE: INDEPENDENT
HYGIENE/GROOMING: INDEPENDENT
DRESS: INDEPENDENT

## 2019-11-16 NOTE — PROGRESS NOTES
GABRIELLA Evaluation Counselor met with pt and completed the Substance use evaluation. Writer sent an SBAR1 to [facility] intake admission pool to initiate the referral process.  Pt's funding source is [employer]

## 2019-11-16 NOTE — PROGRESS NOTES
Rule 25 Assessment  Background Information   1. Date of Assessment Request  2. Date of Assessment  11/16/2019 3. Date Service Authorized     4.   YOVANA Rodríguez   5.  Phone Number [phone] 6. Referent  Self 7. Assessment Site  [facility]     8. Client Name   Bebeto Alvarado 9. Date of Birth  1990 Age  28 year old 10. Gender  male  11. PMI/ Insurance No.  [id number]   12. Client's Primary Language:  English 13. Do you require special accommodations, such as an  or assistance with written material? No   14. Current Address: [address] [place] [address]   15. Client Phone Numbers: [phone] (home)      16. Tell me what has happened to bring you here today.     I am sick of living the way I am, using drugs everyday, living a better life and successful.    17. Have you had other rule 25 assessments?     No    DIMENSION I - Acute Intoxication /Withdrawal Potential   1. Chemical use most recent 12 months outside a facility and other significant use history (client self-report)              X = Primary Drug Used   Age of First Use Most Recent Pattern of Use and Duration   Need enough information to show pattern (both frequency and amounts) and to show tolerance for each chemical that has a diagnosis   Date of last use and time, if needed   Withdrawal Potential? Requiring special care Method of use  (oral, smoked, snort, IV, etc)      Alcohol     No use            Marijuana/  Hashish   No use          Cocaine/Crack     No use          Meth/  Amphetamines   No use          Heroin     21 Age [age] smoking 2-3 times a month  23- 27 started smoking 1/4-1/2 daily  28-IV use started 5 months ago 1/2-1 gram daily 11/14/19 at 10 am yes IV      Other Opiates/  Synthetics   No use          Inhalants     No use          Benzodiazepines     No use          Hallucinogens     No use          Barbiturates/  Sedatives/  Hypnotics No use          Over-the-Counter  Drugs   No use          Other     No use          Nicotine     15 A pack a day 18 no smoke     2. Do you use greater amounts of alcohol/other drugs to feel intoxicated or achieve the desired effect?  Yes.  Or use the same amount and get less of an effect?  No.  Example: The patient reported having increased use and tolerance issues with heroin.    3A. Have you ever been to detox?     No    3B. When was the first time?     The patient denied ever having a detoxification admission.    3C. How many times since then?     The patient denied ever having a detoxification admission.    3D. Date of most recent detox:     19    4.  Withdrawal symptoms: Have you had any of the following withdrawal symptoms?  Past 12 months Recent (past 30 days)   None Unable to Sleep  Sad / Depressed Feeling  Unable to Eat  Anxiety / Worried     's Visual Observations and Symptoms: No visible withdrawal symptoms at this time    Based on the above information, is withdrawal likely to require attention as part of treatment participation?  No    Dimension I Ratings   Acute intoxication/Withdrawal potential - The placing authority must use the criteria in Dimension I to determine a client s acute intoxication and withdrawal potential.    RISK DESCRIPTIONS - Severity ratin Client can tolerate and cope with withdrawal discomfort. The client displays mild to moderate intoxication or signs and symptoms interfering with daily functioning but does not immediately endanger self or others. Client poses minimal risk of severe withdrawal.    REASONS SEVERITY WAS ASSIGNED (What about the amount of the person s use and date of most recent use and history of withdrawal problems suggests the potential of withdrawal symptoms requiring professional assistance? )     Patient reports drug of choice is heroin, admits inability to abstain once use started. His last use of meth 18. He acknowledges some minor withdrawal symptoms in the  past 30 days but displays full functioning to participate in programming.         DIMENSION II - Biomedical Complications and Conditions   1a. Do you have any current health/medical conditions?(Include any infectious diseases, allergies, or chronic or acute pain, history of chronic conditions)       No    1b. On a scale of mild, moderate to severe please specify the severity of the patient's diabetes and/or neuropathy.    The patient denied having a history of being diagnosed with diabetes or neuropathy.    2. Do you have a health care provider? When was your most recent appointment? What concerns were identified?     The patient does not have a PCP at this time.    3. If indicated by answers to items 1 or 2: How do you deal with these concerns? Is that working for you? If you are not receiving care for this problem, why not?      The patient denied having any current clinical health issues.    4A. List current medication(s) including over-the-counter or herbal supplements--including pain management:     Current Facility-Administered Medications   Medication     acetaminophen (TYLENOL) tablet 650 mg     alum & mag hydroxide-simethicone (MYLANTA ES/MAALOX  ES) suspension 30 mL     bisacodyl (DULCOLAX) Suppository 10 mg     buprenorphine (SUBUTEX) sublingual tablet 4 mg     cloNIDine (CATAPRES) tablet 0.1 mg     hydrOXYzine (ATARAX) tablet 25 mg     ibuprofen (ADVIL/MOTRIN) tablet 600 mg     magnesium hydroxide (MILK OF MAGNESIA) suspension 30 mL     naloxone (NARCAN) injection 0.1-0.4 mg     nicotine (NICODERM CQ) 21 MG/24HR 24 hr patch 1 patch     nicotine Patch in Place     nicotine patch REMOVAL     ondansetron (ZOFRAN-ODT) ODT tab 4 mg     traZODone (DESYREL) tablet 50 mg       4B. Do you follow current medical recommendations/take medications as prescribed?     Yes    4C. When did you last take your medication?     Today.    4D. Do you need a referral to have a follow up with a primary care  physician?    No.    5. Has a health care provider/healer ever recommended that you reduce or quit alcohol/drug use?     No    6. Are you pregnant?     NA, because the patient is male    7. Have you had any injuries, assaults/violence towards you, accidents, health related issues, overdose(s) or hospitalizations related to your use of alcohol or other drugs:     No    8. Do you have any specific physical needs/accommodations? No    Dimension II Ratings   Biomedical Conditions and Complications - The placing authority must use the criteria in Dimension II to determine a client s biomedical conditions and complications.   RISK DESCRIPTIONS - Severity ratin Client displays full functioning with good ability to cope with physical discomfort.    REASONS SEVERITY WAS ASSIGNED (What physical/medical problems does this person have that would inhibit his or her ability to participate in treatment? What issues does he or she have that require assistance to address?)    Patient presents with no current medical conditions, has no primary care provider but is able to access medical care if needed.          DIMENSION III - Emotional, Behavioral, Cognitive Conditions and Complications   1. (Optional) Tell me what it was like growing up in your family. (substance use, mental health, discipline, abuse, support)     Grew up a solid family, parents  when I was 7 years, but remains very supportive to my needs. No drugs use or mental health issue the family. Pt has one older brother, no problem reports with him.     2. When was the last time that you had significant problems...  A. with feeling very trapped, lonely, sad, blue, depressed or hopeless  about the future? Past Month    B. with sleep trouble, such as bad dreams, sleeping restlessly, or falling  asleep during the day? Past Month    C. with feeling very anxious, nervous, tense, scared, panicked, or like  something bad was going to happen? Past Month    D. with  becoming very distressed and upset when something reminded  you of the past? 2 - 12 months ago    E. with thinking about ending your life or committing suicide? Never    3. When was the last time that you did the following things two or more times?  A. Lied or conned to get things you wanted or to avoid having to do  something? 2 - 12 months ago    B. Had a hard time paying attention at school, work, or home? 2 - 12 months ago    C. Had a hard time listening to instructions at school, work, or home? 2 - 12 months ago    D. Were a bully or threatened other people? Never    E. Started physical fights with other people? Never    Note: These questions are from the Global Appraisal of Individual Needs--Short Screener. Any item marked  past month  or  2 to 12 months ago  will be scored with a severity rating of at least 2.     For each item that has occurred in the past month or past year ask follow up questions to determine how often the person has felt this way or has the behavior occurred? How recently? How has it affected their daily living? And, whether they were using or in withdrawal at the time?    I don't know man, maybe my drugs use and depressive lifestyle, not being where I am supposed to be in my life.    4A. If the person has answered item 2E with  in the past year  or  the past month , ask about frequency and history of suicide in the family or someone close and whether they were under the influence.     The patient denied any family member or someone close to the patient had ever completed suicide.    Any history of suicide in your family? Or someone close to you?     The patient denied any family member or someone close to the patient had ever completed suicide.    4B. If the person answered item 2E  in the past month  ask about  intent, plan, means and access and any other follow-up information  to determine imminent risk. Document any actions taken to intervene  on any identified imminent risk.      The  patient denied having any suicide ideation within the past month.    5A. Have you ever been diagnosed with a mental health problem?     No      5B. Are you receiving care for any mental health issues? If yes, what is the focus of that care or treatment?  Are you satisfied with the service? Most recent appointment?  How has it been helpful?     The patient denied having any current or past mental health issues that had required treatment.    6. Have you been prescribed medications for emotional/psychological problems?     The patient denied having any history of being prescribed psychotropic medications for mental health issues.    7. Does your MH provider know about your use?     No    8A. Have you ever been verbally, emotionally, physically or sexually abused?      No     Follow up questions to learn current risk, continuing emotional impact.      The patient denied having any history of being verbally, emotionally, physically or sexually abused.    8B. Have you received counseling for abuse?      No    9. Have you ever experienced or been part of a group that experienced community violence, historical trauma, rape or assault?     No    10A. [place]:    No    11. Do you have problems with any of the following things in your daily life?    No      Note: If the person has any of the above problems, follow up with items 12, 13, and 14. If none of the issues in item 11 are a problem for the person, skip to item 15.    The patient denied having any history of having problems with headaches, dizziness, problem solving, concentration, performing job/school work, remembering, in relationships with others, reading, writing, calculation, fights, being fired or arrests in his daily life.    12. Have you been diagnosed with traumatic brain injury or Alzheimer s?  No    13. If the answer to #12 is no, ask the following questions:    Have you ever hit your head or been hit on the head? No    Were you ever seen in the Emergency  Room, hospital or by a doctor because of an injury to your head? No    Have you had any significant illness that affected your brain (brain tumor, meningitis, West Nile Virus, stroke or seizure, heart attack, near drowning or near suffocation)? No    14. If the answer to #12 is yes, ask if any of the problems identified in #11 occurred since the head injury or loss of oxygen. No    15A. Highest grade of school completed:     Some high school, but no degree    15B. Do you have a learning disability? No    15C. Did you ever have tutoring in Math or English? No    15D. Have you ever been diagnosed with Fetal Alcohol Effects or Fetal Alcohol Syndrome? No    16. If yes to item 15 B, C, or D: How has this affected your use or been affected by your use?     No    Dimension III Ratings   Emotional/Behavioral/Cognitive - The placing authority must use the criteria in Dimension III to determine a client s emotional, behavioral, and cognitive conditions and complications.   RISK DESCRIPTIONS - Severity ratin Client has impulse control and coping skills. Client presents a mild to moderate risk of harm to self or others or displays symptoms of emotional, behavioral or cognitive problems. Client has a mental health diagnosis and is stable. Client functions adequately in significant life areas.    REASONS SEVERITY WAS ASSIGNED - What current issues might with thinking, feelings or behavior pose barriers to participation in a treatment program? What coping skills or other assets does the person have to offset those issues? Are these problems that can be initially accommodated by a treatment provider? If not, what specialized skills or attributes must a provider have?    Pt denies any mental health diagnosis but admits to having symptoms of depression, has never seen a mental health provider. Pt struggles with severe difficulty with impulse control and lacks coping skills. He denies any family history of addiction or mental  illness or abuse. He also denies any past or current intent to self-harm. PHQ9 indicating moderately severe depression and GAd7 revealing mild anxiety.          DIMENSION IV - Readiness for Change   1. You ve told me what brought you here today. (first section) What do you think the problem really is?     My drugs use and maybe my depression    2. Tell me how things are going. Ask enough questions to determine whether the person has use related problems or assets that can be built upon in the following areas: Family/friends/relationships; Legal; Financial; Emotional; Educational; Recreational/ leisure; Vocational/employment; Living arrangements (DSM)       Family is not pleased that I am getting high on drugs, unsupervised probation in [facility], financially not doing too good, emotionally-depressed, 11th grades, no GED, no leisure activities naymore beside getting high, unemployed, homeless.    3. What activities have you engaged in when using alcohol/other drugs that could be hazardous to you or others (i.e. driving a car/motorcycle/boat, operating machinery, unsafe sex, sharing needles for drugs or tattoos, etc     The patient reported having a history of driving while under the influence of alcohol or drugs.    4. How much time do you spend getting, using or getting over using alcohol or drugs? (DSM)     A lot, either getting moner to use or getting high.    5. Reasons for drinking/drug use (Use the space below to record answers. It may not be necessary to ask each item.)  Like the feeling Yes   Trying to forget problems Yes   To cope with stress No   To relieve physical pain No   To cope with anxiety No   To cope with depression Yes   To relax or unwind Yes   Makes it easier to talk with people Yes   Partner encourages use No   Most friends drink or use No   To cope with family problems No   Afraid of withdrawal symptoms/to feel better Yes   Other (specify)  No     A. What concerns other people about your  "alcohol or drug use/Has anyone told you that you use too much? What did they say? (DSM)     My family wants the person that I am back not the drugs user. They said that \" I am not the same when I use\"    B. What did you think about that/ do you think you have a problem with alcohol or drug use?     Valid concerns for me. Yes, I have a problem    6. What changes are you willing to make? What substance are you willing to stop using? How are you going to do that? Have you tried that before? What interfered with your success with that goal?       I am willing to do anything to get better, I want to be happy and my family to be happy. I don't know.    7. What would be helpful to you in making this change?     Inpatient treatment.    Dimension IV Ratings   Readiness for Change - The placing authority must use the criteria in Dimension IV to determine a client s readiness for change.   RISK DESCRIPTIONS - Severity ratin Client displays verbal compliance, but lacks consistent behaviors; has low motivation for change; and is passively involved in treatment.    REASONS SEVERITY WAS ASSIGNED - (What information did the person provide that supports your assessment of his or her readiness to change? How aware is the person of problems caused by continued use? How willing is she or he to make changes? What does the person feel would be helpful? What has the person been able to do without help?)      Pt continued to use despite negative consequences, admits his use as problematic to self. Pt appears to be in the contemplation stage of change and verbalizes the need for treatment and need help to achieve long term sobriety.         DIMENSION V - Relapse, Continued Use, and Continued Problem Potential   1A. In what ways have you tried to control, cut-down or quit your use? If you have had periods of sobriety, how did you accomplish that? What was helpful? What happened to prevent you from continuing your sobriety? (DSM)     I " tried using but it did not work out, telling myself that I am going to wean myself of it but never worked out for me. longest time sober was 3-4 months.I was on suboxone for a while but I ran out and started using again    1B. What were the circumstances of your most recent relapse with mood altering chemicals?    I ran out of Suboxone and started using again.    2. Have you experienced cravings? If yes, ask follow up questions to determine if the person recognizes triggers and if the person has had any success in dealing with them.     The patient reported having cravings to use mood altering chemicals on an almost daily basis. No specific triggers.    3. Have you been treated for alcohol/other drug abuse/dependence? Yes.  3B. Number of times(lifetime) (over what period) 1.  3C. Number of times completed treatment (lifetime) 0.  3D. During the past three years have you participated in outpatient and/or residential?  Yes.  3E. When and where? Last Year, [place].   3F. What was helpful? What was not? No, got kicked out.    4. Support group participation: Have you/do you attend support group meetings to reduce/stop your alcohol/drug use? How recently? What was your experience? Are you willing to restart? If the person has not participated, is he or she willing?     No, not really    5. What would assist you in staying sober/straight?     Some good support system and inpatient treatment.    Dimension V Ratings   Relapse/Continued Use/Continued problem potential - The placing authority must use the criteria in Dimension V to determine a client s relapse, continued use, and continued problem potential.   RISK DESCRIPTIONS - Severity ratin No awareness of the negative impact of mental health problems or substance abuse. No coping skills to arrest mental health or addiction illnesses, or prevent relapse.    REASONS SEVERITY WAS ASSIGNED - (What information did the person provide that indicates his or her understanding  of relapse issues? What about the person s experience indicates how prone he or she is to relapse? What coping skills does the person have that decrease relapse potential?)    Patient reports prior in complete treatment episode at [facility] last year and very minimal period of sobriety. He lacks coping skills to prevent relapse, and has little awareness of substance use on his mental health.          DIMENSION VI - Recovery Environment   1. Are you employed/attending school? Tell me about that.      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    2A. Describe a typical day; evening for you. Work, school, social, leisure, volunteer, spiritual practices. Include time spent obtaining, using, recovering from drugs or alcohol. (DSM)     I usually spent my day getting or trying to get high.    Please describe what leisure activities have been associated with your substance abuse:     Walking and go fishing.    2B. How often do you spend more time than you planned using or use more than you planned? (DSM)     Very often.    3. How important is using to your social connections? Do many of your family or friends use?     Most don't even know that I used because I tried to hide it from them. Not many friends use, no family uses.    4A. Are you currently in a significant relationship?     No    4C. Sexual Orientation:     Heterosexual    5A. Who do you live with?      Friends/homeless.    5B. Tell me about their alcohol/drug use and mental health issues.     Pt is homeless.    5C. Are you concerned for your safety there? No    5D. Are you concerned about the safety of anyone else who lives with you? No    6A. Do you have children who live with you?     No    6B. Do you have children who do not live with you?     No    7A. Who supports you in making changes in your alcohol or drug use? What are  they willing to do to support you? Who is upset or angry about you making changes in your alcohol or drug use? How big a problem is this for you?      My parents are willing to do anything.    7B. This table is provided to record information about the person s relationships and available support It is not necessary to ask each item; only to get a comprehensive picture of their support system.  How often can you count on the following people when you need someone?   Partner / Spouse The patient does not have a current partner or spouse.   Parent(s)/Aunt(s)/Uncle(s)/Grandparents Always supportive   Sibling(s)/Cousin(s) Usually supportive   Child(misty) The patient doesn't have any children.   Other relative(s) Usually supportive   Friend(s)/neighbor(s) Usually supportive   Child(misty) s father(s)/mother(s) The patient doesn't have any children.   Support group member(s) The patient denied having any current involvement with 12-step or other support group meetings.   Community of artemio members Rarely supportive   /counselor/therapist/healer The patient denied having any current involvement with a , counselor, therapist or healer.   Other (specify) No     8A. What is your current living situation?     Patient is homeless.    8B. What is your long term plan for where you will be living?     Probably a sober house, support groups, a good job and eventually getting my own place.    8C. Tell me about your living environment/neighborhood? Ask enough follow up questions to determine safety, criminal activity, availability of alcohol and drugs, supportive or antagonistic to the person making changes.      Yes, living on the streets, couch hopping and homeless is unsafe and not good for my recovery.    9. Criminal justice history: Gather current/recent history and any significant history related to substance use--Arrests? Convictions? Circumstances? Alcohol or drug involvement? Sentences? Still on  probation or parole? Expectations of the court? Current court order? Any sex offenses - lifetime? What level? (DSM)    Unsupervised  Probation at [facility]-counterfeiting prescription drugs resulting in a 5th degree possession.    10. What obstacles exist to participating in treatment? (Time off work, childcare, funding, transportation, pending halfway time, living situation)     Funding.    Dimension VI Ratings   Recovery environment - The placing authority must use the criteria in Dimension VI to determine a client s recovery environment.   RISK DESCRIPTIONS - Severity ratin Client has (A) Chronically antagonistic significant other, living environment, family, peer group or long-term criminal justice involvement that is harmful to recovery or treatment progress, or (B) Client has an actively antagonistic significant other, family, work, or living environment with immediate threat to the client's safety and well-being.    REASONS SEVERITY WAS ASSIGNED - (What support does the person have for making changes? What structure/stability does the person have in his or her daily life that will increase the likelihood that changes can be sustained? What problems exist in the person s environment that will jeopardize getting/staying clean and sober?)     Patient is homeless, unemployed with no source of income. Pt reports fractured relationship with family members due to use, loss of employment and is not engaged in any structured activities. Pt is currently on unsupervised probation at [facility] for a 5th degree drugs possession.         Client Choice/Exceptions   Would you like services specific to language, age, gender, culture, Rastafarian preference, race, ethnicity, sexual orientation or disability?  No    What particular treatment choices and options would you like to have? Inpatient.    Do you have a preference for a particular treatment program? [facility]    Criteria for Diagnosis     Criteria for  Diagnosis  DSM-5 Criteria for Substance Use Disorder  Instructions: Determine whether the client currently meets the criteria for Substance Use Disorder using the diagnostic criteria in the DSM-V pp.481-589. Current means during the most recent 12 months outside a facility that controls access to substances    Category of Substance Severity (ICD-10 Code / DSM 5 Code)     Alcohol Use Disorder The patient does not meet the criteria for an Alcohol use disorder.   Cannabis Use Disorder The patient does not meet the criteria for a Cannabis use disorder.   Hallucinogen Use Disorder The patient does not meet the criteria for a Hallucinogen use disorder.   Inhalant Use Disorder The patient does not meet the criteria for an Inhalant use disorder.   Opioid Use Disorder Severe   (F11.20) (304.00)   Sedative, Hypnotic, or Anxiolytic Use Disorder The patient does not meet the criteria for a Sedative/Hypnotic use disorder.   Stimulant Related Disorder The patient does not meet the criteria for a Stimulant use disorder.   Tobacco Use Disorder Moderate   (F17.200) (305.1)   Other (or unknown) Substance Use Disorder The patient does not meet the criteria for a Other (or unknown) Substance use disorder.       Collateral Contact Summary   Number of contacts made: None    Contact with referring person:  No    If court related records were reviewed, summarize here: No court records had been reviewed at the time of this documentation.    Information from collateral contacts supported/largely agreed with information from the client and associated risk ratings.      Rule 25 Assessment Summary and Plan   's Recommendation    1)  Complete a residential based, [facility] w/Lod[facility] or similar MICD treatment program.   2)  Participate in an Opioid replacement therapy.         Collateral Contacts     Name:    Yinka Winston MD Relationship:    Physician   Phone Number:    NA Releases:    No     The patient is a 29yo male with a history  "of opiate use disorder who was admitted to detoxify from heroin. Has been on Suboxone before and is hopeful to return to maintenance. Says that his last use was between \"10 and noon\" yesterday and is feeling some withdrawal. Mood is \"a little depressed.\" Denies SI or HI. Denies AH or VH. Sleeping well and denies nausea or vomiting. Hopeful for [facility] or \"anywhere.\"       Per ER: Bebeto Alvarado is a 28 year old male who presents seeking heroin detox. Mother states he went to [facility] a year ago, had heroin detox there. He states they accused him of going to different places, accused him of using and denies that any of this happened. He relapsed as soon as he got out of [place] and has been using 1/2 a gram of heroin IV daily, last used last night.  He has been injecting in the veins of his antecubital forearms.  He denies any active infections, redness or swelling though has some old bumps over prior infected sites. He is withdrawing and feeling \"like crap\" right now with nausea, goosebumps, diarrhea, generalized malaise. He has had cough. He smokes cigarettes as well, though no other substances. He is otherwise healthy. No other physical symptoms at this time.  Patient has bed on hold for detox.       Collateral Contacts     Name:    Kwame Blake MD   Relationship:    Physician   Phone Number:    NA   Releases:    No     The history is provided by the patient, medical records and a parent.      Bebeto Alvarado is a 28 year old male who presents seeking heroin detox. Mother states he went to [facility] a year ago, had heroin detox there. He states they accused him of going to different places, accused him of using and denies that any of this happened. He relapsed as soon as he got out of [place] and has been using 1/2 a gram of heroin IV daily, last used last night.  He has been injecting in the veins of his antecubital forearms.  He denies any active infections, redness or swelling though " "has some old bumps over prior infected sites. He is withdrawing and feeling \"like crap\" right now with nausea, goosebumps, diarrhea, generalized malaise. He has had cough. He smokes cigarettes as well, though no other substances. He is otherwise healthy. No other physical symptoms at this time.  Patient has bed on hold for detox.      I have reviewed the Medications, Allergies, Past Medical and Surgical History, and Social History in the Epic system.    ollateral Contacts      A problematic pattern of alcohol/drug use leading to clinically significant impairment or distress, as manifested by at least two of the following, occurring within a 12-month period:    1.) Alcohol/drug is often taken in larger amounts or over a longer period than was intended.  2.) There is a persistent desire or unsuccessful efforts to cut down or control alcohol/drug use  3.) A great deal of time is spent in activities necessary to obtain alcohol, use alcohol, or recover from its effects.  4.) Craving, or a strong desire or urge to use alcohol/drug  5.) Recurrent alcohol/drug use resulting in a failure to fulfill major role obligations at work, school or home.  6.) Continued alcohol use despite having persistent or recurrent social or interpersonal problems caused or exacerbated by the effects of alcohol/drug.  7.) Important social, occupational, or recreational activities are given up or reduced because of alcohol/drug use.  8.) Recurrent alcohol/drug use in situations in which it is physically hazardous.  9.) Alcohol/drug use is continued despite knowledge of having a persistent or recurrent physical or psychological problem that is likely to have been caused or exacerbated by alcohol.  10.) Tolerance, as defined by either of the following: A need for markedly increased amounts of alcohol/drug to achieve intoxication or desired effect.  11.) Withdrawal, as manifested by either of the following: The characteristic withdrawal syndrome for " alcohol/drug (refer to Criteria A and B of the criteria set for alcohol/drug withdrawal).      Specify if: In early remission:  After full criteria for alcohol/drug use disorder were previously met, none of the criteria for alcohol/drug use disorder have been met for at least 3 months but for less than 12 months (with the exception that Criterion A4,  Craving or a strong desire or urge to use alcohol/drug  may be met).     In sustained remission:   After full criteria for alcohol use disorder were previously met, non of the criteria for alcohol/drug use disorder have been met at any time during a period of 12 months or longer (with the exception that Criterion A4,  Craving or strong desire or urge to use alcohol/drug  may be met).   Specify if:   This additional specifier is used if the individual is in an environment where access to alcohol is restricted.    Mild: Presence of 2-3 symptoms  Moderate: Presence of 4-5 symptoms  Severe: Presence of 6 or more symptoms
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
## 2019-11-16 NOTE — PLAN OF CARE
Continues in detox status on opiate withdrawal protocol. Cows scores 6 and 5.Vital signs stable.Offers no complaints of physical discomfort. Appetite good. Fluids taken well. Social with peers and staff on unit.Denies suicide ideation and thoughts of self harm. Requested schedule change for Buprenorphine. Patient states he has problems sleeping when he takes Buprenorphine late in the day.

## 2019-11-17 PROCEDURE — 25000132 ZZH RX MED GY IP 250 OP 250 PS 637: Performed by: PSYCHIATRY & NEUROLOGY

## 2019-11-17 PROCEDURE — 12800008 ZZH R&B CD ADULT

## 2019-11-17 PROCEDURE — 25000132 ZZH RX MED GY IP 250 OP 250 PS 637: Performed by: NURSE PRACTITIONER

## 2019-11-17 RX ADMIN — BUPRENORPHINE HCL 4 MG: 2 TABLET SUBLINGUAL at 18:07

## 2019-11-17 RX ADMIN — CLONIDINE HYDROCHLORIDE 0.1 MG: 0.1 TABLET ORAL at 13:01

## 2019-11-17 RX ADMIN — BUPRENORPHINE HCL 4 MG: 2 TABLET SUBLINGUAL at 08:32

## 2019-11-17 RX ADMIN — NICOTINE 1 PATCH: 21 PATCH, EXTENDED RELEASE TRANSDERMAL at 08:31

## 2019-11-17 RX ADMIN — TRAZODONE HYDROCHLORIDE 50 MG: 50 TABLET ORAL at 20:19

## 2019-11-17 ASSESSMENT — ACTIVITIES OF DAILY LIVING (ADL)
ORAL_HYGIENE: INDEPENDENT
HYGIENE/GROOMING: INDEPENDENT
DRESS: INDEPENDENT
LAUNDRY: WITH SUPERVISION

## 2019-11-17 NOTE — PROGRESS NOTES
11/16/19 4544   Group Therapy Session   Group Attendance attended group session   Total Time (minutes) 45   Group Type psychotherapeutic   Group Topic Covered other (see comments)   Patient Participation/Contribution cooperative with task;discussed personal experience with topic;listened actively   Patient participated in psychotherapy group which included a mindfulness activity focusing on the 5 senses and then processing as a group.  Tavo presented as quiet, calm. He engaged in the activity and shared his responses with the group.

## 2019-11-18 PROCEDURE — 99232 SBSQ HOSP IP/OBS MODERATE 35: CPT | Performed by: PSYCHIATRY & NEUROLOGY

## 2019-11-18 PROCEDURE — 25000132 ZZH RX MED GY IP 250 OP 250 PS 637: Performed by: NURSE PRACTITIONER

## 2019-11-18 PROCEDURE — H2035 A/D TX PROGRAM, PER HOUR: HCPCS | Mod: HQ

## 2019-11-18 PROCEDURE — 25000132 ZZH RX MED GY IP 250 OP 250 PS 637: Performed by: PSYCHIATRY & NEUROLOGY

## 2019-11-18 PROCEDURE — 12800008 ZZH R&B CD ADULT

## 2019-11-18 RX ORDER — BUPRENORPHINE AND NALOXONE 4; 1 MG/1; MG/1
1 FILM, SOLUBLE BUCCAL; SUBLINGUAL 2 TIMES DAILY
Qty: 10 FILM | Refills: 0 | Status: SHIPPED | OUTPATIENT
Start: 2019-11-18 | End: 2019-11-20

## 2019-11-18 RX ORDER — BUPRENORPHINE AND NALOXONE 4; 1 MG/1; MG/1
1 FILM, SOLUBLE BUCCAL; SUBLINGUAL 2 TIMES DAILY
Qty: 20 FILM | Refills: 0 | Status: SHIPPED | OUTPATIENT
Start: 2019-11-18 | End: 2019-11-18

## 2019-11-18 RX ADMIN — BUPRENORPHINE HCL 4 MG: 2 TABLET SUBLINGUAL at 08:35

## 2019-11-18 RX ADMIN — CLONIDINE HYDROCHLORIDE 0.1 MG: 0.1 TABLET ORAL at 15:03

## 2019-11-18 RX ADMIN — NICOTINE 1 PATCH: 21 PATCH, EXTENDED RELEASE TRANSDERMAL at 08:34

## 2019-11-18 RX ADMIN — NICOTINE POLACRILEX 2 MG: 2 GUM, CHEWING BUCCAL at 15:03

## 2019-11-18 RX ADMIN — NICOTINE POLACRILEX 2 MG: 2 GUM, CHEWING BUCCAL at 19:46

## 2019-11-18 RX ADMIN — NICOTINE POLACRILEX 2 MG: 2 GUM, CHEWING BUCCAL at 21:07

## 2019-11-18 RX ADMIN — BUPRENORPHINE HCL 4 MG: 2 TABLET SUBLINGUAL at 18:04

## 2019-11-18 RX ADMIN — NICOTINE POLACRILEX 2 MG: 2 GUM, CHEWING BUCCAL at 16:21

## 2019-11-18 RX ADMIN — TRAZODONE HYDROCHLORIDE 50 MG: 50 TABLET ORAL at 21:07

## 2019-11-18 RX ADMIN — NICOTINE POLACRILEX 2 MG: 2 GUM, CHEWING BUCCAL at 10:19

## 2019-11-18 RX ADMIN — NICOTINE POLACRILEX 2 MG: 2 GUM, CHEWING BUCCAL at 18:04

## 2019-11-18 RX ADMIN — NICOTINE POLACRILEX 2 MG: 2 GUM, CHEWING BUCCAL at 12:32

## 2019-11-18 ASSESSMENT — ACTIVITIES OF DAILY LIVING (ADL)
ORAL_HYGIENE: INDEPENDENT
DRESS: INDEPENDENT
LAUNDRY: WITH SUPERVISION
HYGIENE/GROOMING: INDEPENDENT

## 2019-11-18 NOTE — PROGRESS NOTES
[facility], [place]   Psychiatric Progress Note        Interim History:   The patient's care was discussed with the treatment team during the daily team meeting and/or staff's chart notes were reviewed.  Staff report patient is doing better. Withdrawal improved. Compliant with medications and care. No overt psychosis, olga or confusion. Sleeping well and eating better. Rated dep, and anxiety low. No SI or DAJA. Interested in referral to residential CD program at [facility]. Completed CD assessment.     The patient noted withdrawal improved and craving subsided. Concerned about relapsing and hope to remain on Suboxone maintenance while awaiting to start residential CD treatment. Plans to stay with his father while awaiting opening at [facility]. No dep, anx or SI. No hallucinations or racing thoughts. Tolerating medications well. Sleep and appetite are improving.     Discussed medications and care plan.        Medications:       buprenorphine  4 mg Sublingual BID     nicotine  1 patch Transdermal Daily     nicotine   Transdermal Q8H     nicotine   Transdermal Daily          Allergies:     Allergies   Allergen Reactions     Codeine Unknown     Pt has been told.          Labs:   No results found for this or any previous visit (from the past 24 hour(s)).       Psychiatric Examination:     Vitals:    11/17/19 1618 11/17/19 2006 11/18/19 0753 11/18/19 1134   BP: 117/70 101/63 108/61 110/66   BP Location:  Left arm     Pulse: 70 70 60 86   Resp:  16 16 16   Temp: 98  F (36.7  C) 98.2  F (36.8  C) 98.1  F (36.7  C) 98  F (36.7  C)   TempSrc: Oral Tympanic Oral Oral   SpO2: 99%  99%    Weight:       Height:                         Sitting Orthostatic BP: 110/59      Sitting Orthostatic Pulse: 86 bpm                     Weight is 151 lbs 12.8 oz  Body mass index is 23.78 kg/m .    Appearance: awake, alert, appeared as age stated, well groomed and no apparent distress  Attitude:   cooperative  Eye Contact:  good  Mood:  better  Affect:  appropriate and in normal range and full range  Speech:  clear, coherent and normal prosody  Psychomotor Behavior:  no evidence of tardive dyskinesia, dystonia, or tics and intact station, gait and muscle tone  Throught Process:  linear and goal oriented  Associations:  no loose associations  Thought Content:  no evidence of suicidal ideation or homicidal ideation and no evidence of psychotic thought  Insight:  fair  Judgement:  intact  Oriented to:  time, person, and place  Attention Span and Concentration:  intact  Recent and Remote Memory:  intact         Precautions:     Behavioral Orders   Procedures     Code 1 - Restrict to Unit     Routine Programming     As clinically indicated     Status 15     Every 15 minutes.     Withdrawal precautions          Diagnoses:     Opiate use disorder with withdrawal with unspecified complication         Plan:     Medications:  -- the patient was placed on Opiate withdrawal Protocol with Subutex starting at 4 mg BID. The patient will be transitioned and issued Suboxone 4-1 mg BID for maintenance on discharge. He was able to secure an appointment at [facility] on 11/20.     Legal Status and Disposition:  -- volunt.   -- discharge tomorrow at 1200 pm. The patient plans to stay with his father while awaiting opening at [facility].

## 2019-11-18 NOTE — PROGRESS NOTES
Pt clinical faxed to [facility] for auth.  Informed by LP no male beds until next week.  Pt informed and states he can stay with his father.  Pt still trying to decide between maintenance and taper.  If Pt chooses maintenance he will need an appointment with a provider.  Pt will be provided instructions for maintaining his name on the LP wait list in AVS.

## 2019-11-18 NOTE — PROVIDER NOTIFICATION
Pt requesting nicotine gum- states he just wants to smoke- currently using nicotine patch and requesting oral gum for psychological comfort. O/C provider paged and TORB placed after approval.

## 2019-11-18 NOTE — PLAN OF CARE
"Problem: Substance Withdrawal  Goal: Substance Withdrawal  Description  Signs and symptoms of listed problems will be absent or manageable.    1) Patient will achieve medical stabilization of acute withdrawal sx.  2) Patient will remain safe and free from injury  3) Patient will demonstrate improvement of ADLs (appetite, hygiene)  4) Verbalize reduction of fear or anxiety to a manageable level.  5) Verbalize knowledge of substance abuse as a disease  6) Verbalize risks and negative effects related to drug ingestion  7) Demonstrate participation in unit programming and attends specific substance use group therapy (i.e AA meetings)  8) Accept referral to substance abuse treatment  9) Express sense of regaining some control of situation/life (possible by verbalizing alternative coping mechanisms as alternatives to substance use in response to stress)   Outcome: Improving    Patient is up and visible in the milieu. Patient is alert and oriented x 4. Patient is pleasant and cooperative. Patient is attending/participating in unit programming. Pt is social with peers. Affect is blunted/flat, mood is anxious. Patient denies SI/SIB/HI. Pt denies auditory/visual hallucinations. Patient is tolerating medications well, denies any current side effects.     Pt's COWS = 3 upon first morning withdrawal assessment, Patient reports feeling some chills and mild restlessness. Patient reports a good appetite, and fair sleep. Patient discharge date pending. Rest, fluids, and food encouraged. Status 15 checks remain.     Blood pressure 108/61, pulse 60, temperature 98.1  F (36.7  C), temperature source Oral, resp. rate 16, height 1.702 m (5' 7\"), weight 68.9 kg (151 lb 12.8 oz), SpO2 99 %.      "

## 2019-11-19 VITALS
HEIGHT: 67 IN | SYSTOLIC BLOOD PRESSURE: 90 MMHG | BODY MASS INDEX: 23.83 KG/M2 | DIASTOLIC BLOOD PRESSURE: 59 MMHG | HEART RATE: 79 BPM | WEIGHT: 151.8 LBS | RESPIRATION RATE: 16 BRPM | TEMPERATURE: 97.3 F | OXYGEN SATURATION: 99 %

## 2019-11-19 PROCEDURE — 25000132 ZZH RX MED GY IP 250 OP 250 PS 637: Performed by: PSYCHIATRY & NEUROLOGY

## 2019-11-19 PROCEDURE — 25000132 ZZH RX MED GY IP 250 OP 250 PS 637: Performed by: NURSE PRACTITIONER

## 2019-11-19 PROCEDURE — 99238 HOSP IP/OBS DSCHRG MGMT 30/<: CPT | Performed by: PSYCHIATRY & NEUROLOGY

## 2019-11-19 RX ADMIN — BUPRENORPHINE HCL 4 MG: 2 TABLET SUBLINGUAL at 08:00

## 2019-11-19 RX ADMIN — NICOTINE POLACRILEX 2 MG: 2 GUM, CHEWING BUCCAL at 08:42

## 2019-11-19 RX ADMIN — NICOTINE 1 PATCH: 21 PATCH, EXTENDED RELEASE TRANSDERMAL at 07:59

## 2019-11-19 RX ADMIN — NICOTINE POLACRILEX 2 MG: 2 GUM, CHEWING BUCCAL at 10:15

## 2019-11-19 ASSESSMENT — ACTIVITIES OF DAILY LIVING (ADL)
LAUNDRY: WITH SUPERVISION
ORAL_HYGIENE: INDEPENDENT
DRESS: INDEPENDENT
HYGIENE/GROOMING: INDEPENDENT

## 2019-11-19 NOTE — DISCHARGE INSTRUCTIONS
Behavioral Discharge Planning and Instructions    Summary: You were admitted to [facility] on 11/14/19 for detoxification from  opiates.     A medical exam was performed that included lab work. Case management assessment with recommendation for continuing care. You are being discharged to home to wait for a bed [facility].    You have met with a  and opted to discharge home due to lack of [facility] beds on your day of discharge. You will be called at [phone] when a [facility] bed is available or you may call the [facility] Waiting List Line [phone] each morning to check on bed availability.  If a voicemail message is left for you regarding an available bed, it is imperative that you return the call to [phone] within an hour or you may lose your bed for that day. You report that you have a safe, sober and supportive place to stay until a bed becomes available in [facility].    Main Diagnosis: per Dr. Arreguin psychiatrist   Opiate use disorder with withdrawal with unspecified complication      Major Treatments, Procedures and Findings: Your opiate withdrawal was treated with buprenorphine  You will be discharged with a supply of suboxone. You plan to continue on with suboxone maintenance and have an appointment at the integrated clinic    Please keep your suboxone in a safe place preferably in a locked box and out of reach of children/pets/others.     Safety Notice:  The rates for overdoses and deaths from opioids is constantly rising and is a current health epidemic. Please be aware that your tolerance level to any form of opioids has decreased since your hospitalization. Resuming opioid use and/or taking opioids in any form or in combination with any other drugs or alcohol is life-threatening.    Be advised that returning to your drug-using environment and being around people using drugs puts you at high risk for relapse. Healthier support is available daily at local  or NA  meetings.      You report you have a supply of Elvin Can      Symptoms to Report:  If  you experience feeling more anxiety, confusion, losing more sleep, mood declining or thoughts of suicide, notify your treatment team or notify your primary physician. IF ANY OF THE SYMPTOMS YOU ARE EXPERIENCING ARE A MEDICAL EMERGENCY CALL 911 IMMEDIATELY.     Lifestyle Adjustment:   Health Action Plan:  1.Create a daily schedule  2. Eat Healthy  3. Plan Enjoyable Sober Activities  4. Use Problem Solving Skills and Deal with Issues as they Arise.   5. Be Physically Active  6. Take your medications as prescribed  7. Get enough restful sleep  8. Practice Relaxation  9. Spend time with Supportive People  10. No use of alcohol, illegal drugs or addictive medications other than what is currently prescribed.   11.AA, NA Sponsor are excellent resources for support    Keeping hands clean is one of the most important steps we can take to avoid getting sick and spreading germs to others.  Please wash your hands frequently.       Primary Care Provider Follow-up:   Nov 20, 2019  8:30 AM CST  New Visit with Jasmin oCppola CNP  [facility] ([facility]) [address] [place] [address]  [address]  [facility] [id number] [phone]           Resources:   Crisis Intervention: 983.870.6724 or 013-651-4685 (TTY: 432.863.2626).  Call anytime for help.  National [place] on Mental Illness (www.mn.minal.org): 886.314.2867 or 401-971-6414.  Alcoholics Anonymous (www.alcoholics-anonymous.org): Check your phone book for your local chapter.  Suicide Awareness Voices of Education (SAVE) (www.save.org): 550-333-KUHH (7504)  National Suicide Prevention Line (www.mentalhealthmn.org): 971-676-DSIP (0537)  Mental Health Consumer/Survivor Network of [place] (www.mhcsn.net): 737.122.6551 or 461-630-1950  Mental [place] Association [place] (www.mentalhealth.org): 614.387.6299 or 116-551-8741        Www.opioidpreventioncoalition.org      General Medication Instructions:   See your medication sheet(s) for instructions.   Take all medicines as directed.  Make no changes unless your doctor suggests them.   Go to all your doctor visits.  Be sure to have all your required lab tests. This way, your medicines can be refilled on time.  Do not use any drugs not prescribed by your doctor.  Avoid alcohol.    Please Note:  If you have any questions at anytime after you are discharged please call the [facility] detox unit 3A at [phone].  Please take this discharge folder with you to all your follow up appointments, it contains your lab results, diagnosis, medication list and discharge recommendations.

## 2019-11-19 NOTE — DISCHARGE SUMMARY
"  Psychiatric Discharge Summary    Bebeto Alvarado MRN# [id number]   Age: 29 year old YOB: 1990     Date of Admission:  11/14/2019  Date of Discharge:  11/19/2019  Admitting Physician:  Sailaja Arreguin MD  Discharge Physician:  Sailaja Arreguin MD         Event Leading to Hospitalization:     The patient is a 29yo male with a history of opiate use disorder who was admitted to detoxify from heroin. Has been on Suboxone before and is hopeful to return to maintenance. Says that his last use was between \"10 and noon\" yesterday and is feeling some withdrawal. Mood is \"a little depressed.\" Denies SI or HI. Denies AH or VH. Sleeping well and denies nausea or vomiting. Hopeful for [facility] or \"anywhere.\"       Per ER: Bebeto Alvarado is a 28 year old male who presents seeking heroin detox. Mother states he went to [facility] a year ago, had heroin detox there. He states they accused him of going to different places, accused him of using and denies that any of this happened. He relapsed as soon as he got out of [place] and has been using 1/2 a gram of heroin IV daily, last used last night.  He has been injecting in the veins of his antecubital forearms.  He denies any active infections, redness or swelling though has some old bumps over prior infected sites. He is withdrawing and feeling \"like crap\" right now with nausea, goosebumps, diarrhea, generalized malaise. He has had cough. He smokes cigarettes as well, though no other substances. He is otherwise healthy. No other physical symptoms at this time.  Patient has bed on hold for detox.       See Admission note by Yinka Winston MD on 11/15/2019 for additional details.          Diagnoses:     Opiate use disorder with withdrawal with unspecified complication         Labs:     Recent Results (from the past 336 hour(s))   Drug abuse screen 6 urine (tox)    Collection Time: 11/14/19  1:06 PM   Result Value Ref Range    Amphetamine Qual Urine " Negative NEG^Negative    Barbiturates Qual Urine Negative NEG^Negative    Benzodiazepine Qual Urine Negative NEG^Negative    Cannabinoids Qual Urine Positive (A) NEG^Negative    Cocaine Qual Urine Negative NEG^Negative    Ethanol Qual Urine Negative NEG^Negative    Opiates Qualitative Urine Positive (A) NEG^Negative   CBC with platelets differential    Collection Time: 11/14/19  1:32 PM   Result Value Ref Range    WBC 10.3 4.0 - 11.0 10e9/L    RBC Count 4.87 4.4 - 5.9 10e12/L    Hemoglobin 13.7 13.3 - 17.7 g/dL    Hematocrit 42.7 40.0 - 53.0 %    MCV 88 78 - 100 fl    MCH 28.1 26.5 - 33.0 pg    MCHC 32.1 31.5 - 36.5 g/dL    RDW 12.9 10.0 - 15.0 %    Platelet Count 291 150 - 450 10e9/L    Diff Method Automated Method     % Neutrophils 62.1 %    % Lymphocytes 28.1 %    % Monocytes 8.9 %    % Eosinophils 0.5 %    % Basophils 0.2 %    % Immature Granulocytes 0.2 %    Nucleated RBCs 0 0 /100    Absolute Neutrophil 6.4 1.6 - 8.3 10e9/L    Absolute Lymphocytes 2.9 0.8 - 5.3 10e9/L    Absolute Monocytes 0.9 0.0 - 1.3 10e9/L    Absolute Eosinophils 0.1 0.0 - 0.7 10e9/L    Absolute Basophils 0.0 0.0 - 0.2 10e9/L    Abs Immature Granulocytes 0.0 0 - 0.4 10e9/L    Absolute Nucleated RBC 0.0    Partial thromboplastin time    Collection Time: 11/14/19  1:32 PM   Result Value Ref Range    PTT 30 22 - 37 sec   INR    Collection Time: 11/14/19  1:32 PM   Result Value Ref Range    INR 1.11 0.86 - 1.14   Comprehensive metabolic panel    Collection Time: 11/14/19  1:32 PM   Result Value Ref Range    Sodium 142 133 - 144 mmol/L    Potassium 4.7 3.4 - 5.3 mmol/L    Chloride 106 94 - 109 mmol/L    Carbon Dioxide 32 20 - 32 mmol/L    Anion Gap 4 3 - 14 mmol/L    Glucose 106 (H) 70 - 99 mg/dL    Urea Nitrogen 15 7 - 30 mg/dL    Creatinine 0.61 (L) 0.66 - 1.25 mg/dL    GFR Estimate >90 >60 mL/min/[1.73_m2]    GFR Estimate If Black >90 >60 mL/min/[1.73_m2]    Calcium 9.0 8.5 - 10.1 mg/dL    Bilirubin Total 0.3 0.2 - 1.3 mg/dL    Albumin 3.6  3.4 - 5.0 g/dL    Protein Total 7.2 6.8 - 8.8 g/dL    Alkaline Phosphatase 80 40 - 150 U/L    ALT 18 0 - 70 U/L    AST 11 0 - 45 U/L   TSH    Collection Time: 11/14/19  1:32 PM   Result Value Ref Range    TSH 0.96 0.40 - 4.00 mU/L   GGT    Collection Time: 11/14/19  1:32 PM   Result Value Ref Range    GGT 17 0 - 75 U/L   Hepatitis C antibody    Collection Time: 11/14/19  1:32 PM   Result Value Ref Range    Hepatitis C Antibody Nonreactive NR^Nonreactive   HIV Antigen Antibody Combo    Collection Time: 11/14/19  1:32 PM   Result Value Ref Range    HIV Antigen Antibody Combo Nonreactive NR^Nonreactive       Hepatitis B Surface Antibody    Collection Time: 11/14/19  1:32 PM   Result Value Ref Range    Hepatitis B Surface Antibody 2.57 <8.00 m[IU]/mL   Hepatitis B surface antigen    Collection Time: 11/14/19  1:32 PM   Result Value Ref Range    Hep B Surface Agn Nonreactive NR^Nonreactive   Lipid panel    Collection Time: 11/15/19  7:48 AM   Result Value Ref Range    Cholesterol 168 <200 mg/dL    Triglycerides 109 <150 mg/dL    HDL Cholesterol 36 (L) >39 mg/dL    LDL Cholesterol Calculated 110 (H) <100 mg/dL    Non HDL Cholesterol 132 (H) <130 mg/dL            Consults:   Bebeto Alvarado is a 28 year old male with a history of opiate dependence who is admitted to [facility] with heroin detoxication. Internal Medicine consultation was ordered by Dr. Winston for evaluate for complications from heroin withdrawal.       Opiate use disorder with acute withdrawal  Cannabis use disorder:  Uses 1/2 to 1 gram of IV heroin daily. Last used 11/13. Injects into bilateral antecubital fossas. Does not share needles but does reuse. No s/sx of injection site infection or abscess. Previously went to [place] last year for treatment. Denies any other illicit drug use other than occasional marijuana use. Vital signs stable on admission. Labs reviewed including CBC and CMP without acute abnormality. GGT negative. TSH WNL.   -  Management per psychiatry team.   - HIV, Hep C antibody and Hep B ab/antigen negative   - Please have patient set up appointment/establish care with PCP prior to discharge     Tobacco use:   Smokes 1/2 ppd for ~10 years.    - Encouraged cessation   - Nicotine supplementation prn      Abnormal lipid panel:   Cholesterol 168, , HDL 36 and Triglycerides 109. Unclear if fasting.   - Lifestyle modifications  - Patient will need follow-up fasting lipid panel in 2-4 weeks      No further medical intervention is required at this time. Medicine signing off. Please feel free to call with any questions.      Nallely Baez PA-C  Hospitalist Service        [facility] Course:   Bebeto Alvarado was admitted to Detox Unit with attending Sailaja Arreguin MD as a voluntary patient. The patient was placed under status 15 (15 minute checks) to ensure patient safety. The patient was placed on Opiate withdrawal Protocol with Subutex starting at 4 mg BID. The patient will be transitioned and issued Suboxone 4-1 mg BID for maintenance on discharge. He was able to secure an appointment at [facility] on 11/20.  The patient completed CD assessment. The patient plans to stay with his father while awaiting opening at [facility].     The patient tolerated medications well. Reported mood symptoms resolved. The patient was active on the unit. The patient was social, engaged and attended groups. No overt olga, confusion or psychosis noted. The patient maintained denial of SI, HI and DAJA. The patient slept well. Appetite was intact. The patient was compliant with medications and care.     Bebeto Alvarado will be released to home. At the time of this encounter, Bebeto Alvarado was determined to not be a danger to himself or others and symptoms did not meet criteria for involuntary hospitalization.  The patient denied depression, anxiety, racing thoughts and irritability. The patient denied hallucinations  and paranoia. The patient was future oriented and denied SI, DAJA and HI. The patient noted tolerating medications well.    Steps taken to minimize risk include: assessing patient s behavior and thought process daily during hospital stay, discharging patient with adequate plan for follow up for mental and physical health, and discussing safety plan of returning to the hospital or calling 911, should the patient ever has thoughts of harming self or others. Therefore, based on all available evidence including the factors cited above, the patient does not appear to be at imminent risk for self-harm, and is appropriate for outpatient level of care.  The patient agreed to continue medications and outpatient care.          Discharge Medications:     Current Discharge Medication List      START taking these medications    Details   buprenorphine HCl-naloxone HCl (SUBOXONE) 4-1 MG per film Place 1 Film under the tongue 2 times daily for 5 days  Qty: 10 Film, Refills: 0    Associated Diagnoses: Substance abuse withdrawal with complication (H)                  Psychiatric and Physical Examinations:   Appearance:  awake, alert, dressed in hospital scrubs, well groomed and no apparent distress  Attitude:  cooperative  Eye Contact:  good  Mood:  better and good  Affect:  appropriate and in normal range, mood congruent and full range  Speech:  clear, coherent and normal prosody  Psychomotor Behavior:  no evidence of tardive dyskinesia, dystonia, or tics and intact station, gait and muscle tone  Thought Process:  linear and goal oriented  Associations:  no loose associations  Thought Content:  no evidence of suicidal ideation or homicidal ideation and no evidence of psychotic thought  Insight:  fair  Judgment:  intact  Oriented to:  time, person, and place  Attention Span and Concentration:  intact  Recent and Remote Memory:  intact  Language and Fund of Knowledge: appropriate  Muscle Strength and Tone: normal  Gait and Station:  Normal  Vitals:    11/18/19 1134 11/18/19 1613 11/18/19 2013 11/19/19 0744   BP: 110/66 109/64 127/71 90/59   BP Location:  Left arm     Pulse: 86 69 84 79   Resp: 16 16 16 16   Temp: 98  F (36.7  C) 97.6  F (36.4  C) 98.4  F (36.9  C) 97.3  F (36.3  C)   TempSrc: Oral Oral Tympanic Oral   SpO2:    99%   Weight:       Height:              Discharge Plan:        Primary Care Provider Follow-up:   Nov 20, 2019  8:30 AM CST  New Visit with Jasmin Coppola CNP  [facility] ([facility]) [address]   [address]  [facility] [address] [phone]       Resources:   Crisis Intervention: 726.312.1778 or 968-766-4807 (TTY: 546.684.9667).  Call anytime for help.  National [place] on Mental Illness (www.mn.minal.org): 700.411.3158 or 550-093-6163.  Alcoholics Anonymous (www.alcoholics-anonymous.org): Check your phone book for your local chapter.  Suicide Awareness Voices of Education (SAVE) (www.save.org): 443-848-HKMQ (4253)  National Suicide Prevention Line (www.mentalhealthmn.org): 880-969-YIJF (2377)  Mental Health Consumer/Survivor Network of [place] (www.mhcsn.net): 132.244.8068 or 097-983-8027  Mental [place] Association [place] (www.mentalhealth.org): 685.992.5852 or 795-260-5443 Www.opioidpreventioncoalition.org     Attestation:  The patient has been seen and evaluated by me,  Sailaja Arreguin MD

## 2019-11-19 NOTE — PROGRESS NOTES
"  SUBJECTIVE:                                                    CC:  \"Suboxone\"    Bebeto Alvarado is a 29 year old male who presents for  initial visit for addiction consultation and management referred by [facility] for opioid use and consideration for MAT.   Preferred name: \"Tavo\"    Primary care provider: No PCP - usually goes to [facility] as needed  Psychiatric provider or therapist: None    [place] Board of Pharmacy Data Base Reviewed:    Yes; no concerns for diversionary activity - no activity in > 1 year    HPI:   Per Dr. Winston 11/15/2019 -   The patient is a 29yo male with a history of opiate use disorder who was admitted to detoxify from heroin. Has been on Suboxone before and is hopeful to return to maintenance. Says that his last use was between \"10 and noon\" yesterday and is feeling some withdrawal. Mood is \"a little depressed.\" Denies SI or HI. Denies AH or VH. Sleeping well and denies nausea or vomiting. Hopeful for [facility] or \"anywhere.\"       Patient reports recreational use of mushrooms and THC as a teenager. Then at 17 yo started taking oxycontin orally recreationally, intermittently at parties.  Patient states he switched to heroin around 20 when oxycontin forumlation changed to prevent abuse.  He reports occasional Heroin use (smoking) until age [age] when he started using daily due to withdrawal symptoms if he didn't use.  Only smoking heroin until he started injecting it 5 months ago, using 1/2 to 1 gram daily IV since.  Last use of heroin reported as 11/15/2019 prior to detox admission.  Reports history of losing jobs due to use, losing friends, fighting with his older brother, using more than intended for longer than intended, tolerance and withdrawal symptoms.      He was admitted to [facility] detox on 11/15/2019 - 11/19/2019 for opioid withdrawal without complications.  He reports being on suboxone in the past and is interested in " "continuing on maintenance.  Current dose Suboxone 4-1mg film, under the tongue BID.  He reports mild sweats/chills and piloerection that occur intermittently.  He denies cravings for opioids and is taking his suboxone as prescribed.  He has 3 films left from detox discharge medications and has already taken one dose for today.  He denies any side effects from the suboxone and reports his mood has improved now that he is through the peak of his withdrawal and stable on suboxone.  Reports episodes of low mood occurs solely in the context of substance use/withdrawal.  Sleeping \"okay\" and denies anxiety.  See below for more detailed use history for non-opioid substances.       CD History:     SUBSTANCE(S)  OF CHOICE - Heroin          Date of last use - 11/13/2019 around Noon  History of use - relapsed about 6 months using 1/2 to 1 gram daily  IV drug use - daily for 5 months into AC forearms,  most recent IVDU - 11/15/2019  Longest period of sobriety - 4 months sober after leaving [facility]  Previous detox/treatment/MAT - [facility] for detox and tx in Oct 2018 for a week but was kicked out due to accusations that he was buying drugs on another part of campus; first detox stay 11/2019 discharged to   Medical complications from substance use - denies prior infections from injecting  History of overdose - denies  Narcan currently available - has some at home    Other Substance Use:  ALCOHOL - rarely   OPIATES - See HPI  BENZODIAZEPINES - xanax a few times years ago; ended up blacking out and afraid to take them  AMPHETAMINES/METHAMPHETAMINES - prescribed adderall as a kid but didn't take due to GI side effects; experimental use meth   MARIJUANA - smokes THC 3 x a week a few hits at a time  COCAINE - recreationally   HALLUCINOGENS - mushrooms x 2 in high school   ANABOLIC STEROIDS - denies   OTHER  (Gambling, shopping) - denies    NICOTINE- 1 ppd    Desire to quit - maybe, \"not sure right now\"  Previous quit attempts - yes " in 2014  Barriers to quitting - stress, waiting to get into treatment; unsure if ready   Hx of medications for tobacco cessation - chantix age 18, side effects including depression and vivid dreams    Hepatitis C Status - Nonreactive 11/14/2019   HIV status - Nonreactive 11/14/2019     PAST PSYCHIATRIC HISTORY - reports depressed mood when coming off opioids or between use; stable now  Past diagnoses - ADHD and oppositional defiant disorder as a child  Hospitalizations/commitment - Denies  Suicide attempts - denies   Psychotherapy/ECT - Individual therapy as a kid for anger management   Medication trials - Chantix (mood side effects/vivid dreams) and Adderall (as child, GI disturbances) - no other medication trials      MEDICAL HISTORY:  Patient Active Problem List    Diagnosis Date Noted     Substance abuse withdrawal with complication (H) 11/14/2019     Priority: Medium     Attention deficit disorder 12/15/2004     Priority: Medium     Epic       Cannabis abuse 12/15/2004     Priority: Medium     Epic       Problem list, allergies, and histories reviewed & adjusted, as indicated.  Additional history: as documented     Past Medical History:   Diagnosis Date     Opiate abuse, continuous (H)      Tobacco use      Reviewed. No past surgical history.       Family History   Problem Relation Age of Onset     Diabetes Mother      Hypertension Mother      No Known Problems Father      No Known Problems Brother      Suicide Other      SOCIAL HISTORY:     Current living situation: currently [place] with his mom [place] while waiting to get into treatment; prior to that was living with a friend but after getting into an argument with his roommate he ended up staying on other people's couches for 2 weeks before going into detox  Marital status: single, never   Children: none  Employment/financial status: Unemployed currently; dropped out of high school senior year - was smoking THC; no GED but wants  "to get in future; previous jobs include  for [employer] - lost job for using as he was \"slacking\" at work; he reports interest in working in customer service or sales in the future  Current recovery program (meetings, sponsorship): waiting to get into [facility] - plans to attend NA meetings with a friend before admission  Transportation: own car and license  Legal hx: on probation for 5th degree possession of a fake prescription for opioids that a \"friend\" talked him into trying to get filled at the pharmacy; 1 year of probation left; no pending court dates    Current Outpatient Medications   Medication Sig Dispense Refill     buprenorphine HCl-naloxone HCl (SUBOXONE) 4-1 MG per film Place 1 Film under the tongue 2 times daily 11 Film 0       Allergies   Allergen Reactions     Codeine Unknown     Pt has been told.       REVIEW OF SYSTEMS:    General: Reports mild acute withdrawal symptoms that occur intermittently - including sweats/piloerection, tolerable.  No recent infections or fever  Eyes:  No vision concerns.  No double vision.    Resp: No coughing, wheezing or shortness of breath  CV: No chest pains or palpitations  GI: No nausea, vomiting, abdominal pain, diarrhea.  No constipation  : No urinary frequency or dysuria    Musculoskeletal: No significant muscle or joint pains other than as above.  No edema  Neurologic: No numbness, tingling, weakness, problems with balance or coordination  Psychiatric: No acute concerns other than as above. See mental status exam for more information.   Skin: No rashes or areas of acute infection; no piloerection at this time    OBJECTIVE:                                                      Lab results reviewed an pertinent results included in HPI and below.  11/20/2019 UDS + for THC, opioids, and buprenorphine.     PHYSICAL EXAM:  /62   Pulse 77   Temp 99.8  F (37.7  C) (Temporal)   Resp 17   Ht 1.702 m (5' 7\")   Wt 68.5 kg (151 lb)   SpO2 " 99%   BMI 23.65 kg/m      GENERAL APPEARANCE: alert, comfortable appearing  EYES: Eyes grossly normal to inspection  HENT: TM's normal, mouth without ulcers or lesions, nose no rhinorrhea  NECK: no adenopathy, thyromegaly or masses  RESP: lungs clear to auscultation - no rales, rhonchi or wheezes and no resp distress  CV: regular rates and rhythm, normal S1 S2,no murmur   ABDOMEN: soft, nontender, without hepatosplenomegaly or masses  MS: extremities normal- no gross deformities noted, gait normal, peripheral pulses normal and no edema  SKIN: no rashes, no jaundice, no obvious masses.   NEURO: Normal strength and tone, sensory exam grossly normal, no tremor    MENTAL STATUS EXAM:  Appearance/Behavior:No appearant distress and Neatly groomed  Speech: Normal  Mood/Affect: normal affect  Insight: Adequate    Results for orders placed or performed in visit on 11/20/19   Urine Drugs of Abuse Screen Panel 13     Status: Abnormal   Result Value Ref Range    Cannabinoids (23-vtf-9-carboxy-9-THC) Detected, Abnormal Result (A) NDET^Not Detected ng/mL    Phencyclidine (Phencyclidine) Not Detected NDET^Not Detected ng/mL    Cocaine (Benzoylecgonine) Not Detected NDET^Not Detected ng/mL    Methamphetamine (d-Methamphetamine) Not Detected NDET^Not Detected ng/mL    Opiates (Morphine) Detected, Abnormal Result (A) NDET^Not Detected ng/mL    Amphetamine (d-Amphetamine) Not Detected NDET^Not Detected ng/mL    Benzodiazepines (Nordiazepam) Not Detected NDET^Not Detected ng/mL    Tricyclic Antidepressants (Desipramine) Not Detected NDET^Not Detected ng/mL    Methadone (Methadone) Not Detected NDET^Not Detected ng/mL    Barbiturates (Butalbital) Not Detected NDET^Not Detected ng/mL    Oxycodone (Oxycodone) Not Detected NDET^Not Detected ng/mL    Propoxyphene (Norpropoxyphene) Not Detected NDET^Not Detected ng/mL    Buprenorphine (Buprenorphine) Detected, Abnormal Result (A) NDET^Not Detected ng/mL         ASSESSMENT/PLAN:                                                       (F11.20) Opioid use disorder, moderate, dependence (H)  (primary encounter diagnosis)  Comment: Patient has 3 films left from detox discharge medications.  Rx e-prescribed for 11 films to last until his appointment next week.   Plan: buprenorphine HCl-naloxone HCl (SUBOXONE) 4-1 MG per film; take 1 film under the tongue BID.     (F17.200) Tobacco use disorder  Comment: Not interested in quitting right now but open to it in the near future.   Plan: Continue to check in with patient regarding readiness to quit at future visits.     (F12.90) Cannabis use, uncomplicated  Comment: Intermittent use; encouraged abstinence.     (Z79.899) High risk medication use  Comment: Suboxone 4-1mg film under the tongue BID.   Plan: Routine UDS.  Monitor symptoms listed below     Follow-Up: in 1 week and continue to call [facility] to check on the status of your admission date.       ENCOUNTER FOR LONG TERM USE OF HIGH RISK MEDICATION   High Risk Drug Monitoring?  YES   Drug being monitored: Suboxone   Reason for drug: Opioid Use Disorder, moderate   What is being monitored?: Dosage, Cravings, Triggers and side effects    Patient Education:   Discussed options for treatment of opioid use disorder with medications including Suboxone.  Risks, benefits, side effects, and logistical issues with this medication has been reviewed at length.    Counseled the patient on the importance of having a recovery program in addition to medication assisted treatment (MAT).  Components of a recovery program include having some type of sober network, avoiding isolating, willingness to change, avoiding triggers, and managing cravings.      Strongly recommended to abstain from alcohol, benzodiazepines, THC, opioids, and other drugs of abuse.  Increased risk of relapse for opioids with use of these substances discussed.  Increased risk of overdose/death with use of other substances particularly  benzodiazepines/alcohol reviewed.   Risk of overdose following a period of abstinence due to decrease tolerance was discussed including risk of death. Risk of overdose if using Suboxone with other substances particuarly benzodiazepines reviewed.        Jasmin Coppola CNP  [facility] - Addiction Medicine  [facility]  [phone]

## 2019-11-19 NOTE — PROGRESS NOTES
11/18/19 [id number]   Group Therapy Session   Group Attendance attended group session   Total Time (minutes) 40   Group Type psychotherapeutic   Group Topic Covered other (see comments)   Patient Participation/Contribution cooperative with task;discussed personal experience with topic;listened actively   Psychotherapy group goals: Identify and share responses to 4 questions (fears, loves/likes, things to let go, wants). Tavo reported feeling anxious. Affect congruent. He participated in the activity and processed with the group.

## 2019-11-19 NOTE — PLAN OF CARE
"  Problem: Substance Withdrawal  Goal: Substance Withdrawal  Description  Signs and symptoms of listed problems will be absent or manageable.    1) Patient will achieve medical stabilization of acute withdrawal sx.  2) Patient will remain safe and free from injury  3) Patient will demonstrate improvement of ADLs (appetite, hygiene)  4) Verbalize reduction of fear or anxiety to a manageable level.  5) Verbalize knowledge of substance abuse as a disease  6) Verbalize risks and negative effects related to drug ingestion  7) Demonstrate participation in unit programming and attends specific substance use group therapy (i.e AA meetings)  8) Accept referral to substance abuse treatment  9) Express sense of regaining some control of situation/life (possible by verbalizing alternative coping mechanisms as alternatives to substance use in response to stress)   Outcome: Adequate for Discharge  Pt being discharges to home to stay with his father to await a treatment bed at the  program.     Pt reporting mild opiate withdrawal symptoms. Has been on buprenorphine 4 mg BID and plans to continue on with suboxone maintenance at the integrated clinic.    Pt reports mild anxiety and depression. Denies SI. States his mood is \"Today is good and hopeful.\"    Pt out on unit. Affect bright.     Reports his sleep and appetite have been good.     Pt provided resources for recovery meetings.     Pt states he has narcan supply.   BP slightly low in am.     Reviewed discharge instructions with patient and he verbalized understanding.   See discharge instructions.   Pt's father is picking patient up.      "

## 2019-11-20 ENCOUNTER — OFFICE VISIT (OUTPATIENT)
Dept: ADDICTION MEDICINE | Facility: CLINIC | Age: 29
End: 2019-11-20
Payer: MEDICAID

## 2019-11-20 VITALS
WEIGHT: 151 LBS | SYSTOLIC BLOOD PRESSURE: 110 MMHG | HEART RATE: 77 BPM | HEIGHT: 67 IN | OXYGEN SATURATION: 99 % | RESPIRATION RATE: 17 BRPM | BODY MASS INDEX: 23.7 KG/M2 | TEMPERATURE: 99.8 F | DIASTOLIC BLOOD PRESSURE: 62 MMHG

## 2019-11-20 DIAGNOSIS — F17.200 TOBACCO USE DISORDER: ICD-10-CM

## 2019-11-20 DIAGNOSIS — F12.90 CANNABIS USE, UNCOMPLICATED: ICD-10-CM

## 2019-11-20 DIAGNOSIS — F11.20 OPIOID USE DISORDER, MODERATE, DEPENDENCE (H): Primary | ICD-10-CM

## 2019-11-20 DIAGNOSIS — Z79.899 HIGH RISK MEDICATION USE: ICD-10-CM

## 2019-11-20 LAB
AMPHETAMINES UR QL: NOT DETECTED NG/ML
BARBITURATES UR QL SCN: NOT DETECTED NG/ML
BENZODIAZ UR QL SCN: NOT DETECTED NG/ML
BUPRENORPHINE UR QL: ABNORMAL NG/ML
CANNABINOIDS UR QL: ABNORMAL NG/ML
COCAINE UR QL SCN: NOT DETECTED NG/ML
D-METHAMPHET UR QL: NOT DETECTED NG/ML
METHADONE UR QL SCN: NOT DETECTED NG/ML
OPIATES UR QL SCN: ABNORMAL NG/ML
OXYCODONE UR QL SCN: NOT DETECTED NG/ML
PCP UR QL SCN: NOT DETECTED NG/ML
PROPOXYPH UR QL: NOT DETECTED NG/ML
TRICYCLICS UR QL SCN: NOT DETECTED NG/ML

## 2019-11-20 PROCEDURE — 99495 TRANSJ CARE MGMT MOD F2F 14D: CPT | Performed by: NURSE PRACTITIONER

## 2019-11-20 RX ORDER — BUPRENORPHINE AND NALOXONE 4; 1 MG/1; MG/1
1 FILM, SOLUBLE BUCCAL; SUBLINGUAL 2 TIMES DAILY
Qty: 11 FILM | Refills: 0 | Status: SHIPPED | OUTPATIENT
Start: 2019-11-20 | End: 2019-11-21

## 2019-11-20 ASSESSMENT — MIFFLIN-ST. JEOR: SCORE: 1603.56

## 2019-11-21 ENCOUNTER — TELEPHONE (OUTPATIENT)
Dept: ADDICTION MEDICINE | Facility: CLINIC | Age: 29
End: 2019-11-21

## 2019-11-21 DIAGNOSIS — F11.20 OPIOID USE DISORDER, MODERATE, DEPENDENCE (H): ICD-10-CM

## 2019-11-21 RX ORDER — BUPRENORPHINE AND NALOXONE 4; 1 MG/1; MG/1
1 FILM, SOLUBLE BUCCAL; SUBLINGUAL 2 TIMES DAILY
Qty: 11 FILM | Refills: 0 | Status: SHIPPED | OUTPATIENT
Start: 2019-11-21 | End: 2019-12-04

## 2019-11-21 NOTE — TELEPHONE ENCOUNTER
Reason for Call:  Subx question    Detailed comments: Pt called stating that the pharmacy informed him that he provider was able to write for this as she isn't a doctor and isn't able to write for subx anyway. Pt also stated that the pharmacy told him there was an issue with insurance that he can't  till tomorrow 11/22/19    Pharmacy tel: [facility] tel: [phone]    Phone Number Patient can be reached at: Home number on file [phone] (home)    Best Time: anytime    Can we leave a detailed message on this number? YES    Call taken on 11/21/2019 at 2:41 PM by Socorro Krause

## 2019-11-21 NOTE — TELEPHONE ENCOUNTER
"Spoke to Maria Dolores at [facility]-- states that Jasmin is not a covered provider under patient's plan. Informed writer to [facility] to discuss.    [facility]-- spoke to Lisa.   States that NPI [id number] does not pull up any providers in their system.  States this means provider is not enrolled to be a covered provider.  Writer explained that provider was credentialed for all insurances prior to starting-- Lisa stated that provider is not credentialed through them which applies to \"all [place] Medicaid programs\".     Writer did verify NPI for provider-- confirmed correct NPI was given to [facility].    Per Lisa with [facility], another provider who is covered under the plan will need to write the Rx in order for it to go through.     Provider notified.   Dr.Alex Melo offered to review Rx.   Will route to  to review/sign Rx.      Rita Caldera RN  11/21/19  3:46 PM    "

## 2019-11-29 ENCOUNTER — HOSPITAL ENCOUNTER (OUTPATIENT)
Dept: BEHAVIORAL HEALTH | Facility: CLINIC | Age: 29
End: 2019-11-29
Attending: FAMILY MEDICINE
Payer: MEDICAID

## 2019-11-29 ENCOUNTER — BEH TREATMENT PLAN (OUTPATIENT)
Dept: BEHAVIORAL HEALTH | Facility: CLINIC | Age: 29
End: 2019-11-29
Attending: FAMILY MEDICINE

## 2019-11-29 VITALS
HEIGHT: 67 IN | WEIGHT: 152 LBS | BODY MASS INDEX: 23.86 KG/M2 | HEART RATE: 77 BPM | SYSTOLIC BLOOD PRESSURE: 142 MMHG | DIASTOLIC BLOOD PRESSURE: 86 MMHG

## 2019-11-29 DIAGNOSIS — F19.20 CHEMICAL DEPENDENCY (H): ICD-10-CM

## 2019-11-29 DIAGNOSIS — F17.200 NICOTINE DEPENDENCE: Primary | ICD-10-CM

## 2019-11-29 PROCEDURE — 10020000 ZZH LODGING PLUS FACILITY CHARGE ADULT

## 2019-11-29 PROCEDURE — H2035 A/D TX PROGRAM, PER HOUR: HCPCS | Mod: HQ

## 2019-11-29 RX ORDER — AMOXICILLIN 250 MG
2 CAPSULE ORAL DAILY PRN
COMMUNITY
End: 2019-12-21

## 2019-11-29 RX ORDER — ACETAMINOPHEN 325 MG/1
325-650 TABLET ORAL EVERY 4 HOURS PRN
COMMUNITY
End: 2019-12-21

## 2019-11-29 RX ORDER — LORATADINE 10 MG/1
10 TABLET ORAL DAILY PRN
COMMUNITY
End: 2019-12-21

## 2019-11-29 RX ORDER — LANOLIN ALCOHOL/MO/W.PET/CERES
3 CREAM (GRAM) TOPICAL
COMMUNITY
End: 2019-12-04

## 2019-11-29 RX ORDER — IBUPROFEN 200 MG
400 TABLET ORAL EVERY 6 HOURS PRN
COMMUNITY
End: 2019-12-21

## 2019-11-29 RX ORDER — MAGNESIUM HYDROXIDE/ALUMINUM HYDROXICE/SIMETHICONE 120; 1200; 1200 MG/30ML; MG/30ML; MG/30ML
30 SUSPENSION ORAL EVERY 6 HOURS PRN
COMMUNITY
End: 2019-12-21

## 2019-11-29 ASSESSMENT — MIFFLIN-ST. JEOR: SCORE: 1608.1

## 2019-11-29 ASSESSMENT — PATIENT HEALTH QUESTIONNAIRE - PHQ9: SUM OF ALL RESPONSES TO PHQ QUESTIONS 1-9: 11

## 2019-11-29 ASSESSMENT — ANXIETY QUESTIONNAIRES
7. FEELING AFRAID AS IF SOMETHING AWFUL MIGHT HAPPEN: SEVERAL DAYS
3. WORRYING TOO MUCH ABOUT DIFFERENT THINGS: NOT AT ALL
2. NOT BEING ABLE TO STOP OR CONTROL WORRYING: NOT AT ALL
5. BEING SO RESTLESS THAT IT IS HARD TO SIT STILL: NOT AT ALL
GAD7 TOTAL SCORE: 4
1. FEELING NERVOUS, ANXIOUS, OR ON EDGE: MORE THAN HALF THE DAYS
IF YOU CHECKED OFF ANY PROBLEMS ON THIS QUESTIONNAIRE, HOW DIFFICULT HAVE THESE PROBLEMS MADE IT FOR YOU TO DO YOUR WORK, TAKE CARE OF THINGS AT HOME, OR GET ALONG WITH OTHER PEOPLE: SOMEWHAT DIFFICULT
4. TROUBLE RELAXING: SEVERAL DAYS
6. BECOMING EASILY ANNOYED OR IRRITABLE: NOT AT ALL

## 2019-11-29 ASSESSMENT — PAIN SCALES - GENERAL: PAINLEVEL: NO PAIN (0)

## 2019-11-29 NOTE — PROGRESS NOTES
Progress Note    This patient had a Rule 25 Assessment on 11/16/2019 completed by YOVANA Madsen.  This patient was seen for a face to face update of the Rule 25 Assessment on 11/29/2019 by YOVANA Valentino.  INSIDE: The patient's Rule 25 Assessment completed on 11/16/2019 is in the patient's electronic medical record in Epic in the Chart Review section under the Notes/Trans Tab.    Alcohol/Drug use since the last CD evaluation (include date of last use):     Patient used Heroin. 2 times, IV, 1/4 gram.  TJ: 11/26/2019. IV, 1/4 gram     Please note any other clinical changes since the last CD evaluation (such as medication changes, additional legal charges, detoxification admissions, overdoses, etc.)     No significant changes since the last CD evaluation       ASAM Dimensions Original scores Current Scores   I.) Intoxication and Withdrawal: 1 1   II.) Biomedical:  0 0   III.) Emotional and Behavioral:  2 2   IV.) Readiness to Change:  2 2   V.) Relapse Potential: 4 4   VI.) Recovery Environmental: 4 4     Please list clinical justifications for the above ASAM score changes since the original comprehensive assessment:     None of the ASAM scores on the six dimensions had changed since the Rule 25 Assessment was completed on 11/16/2019.       Current PATRICIA: Current UA:     0.000     Positive for Buprenorphine, Cocaine, Heroin/Morphine, Oxycodone and THC and negative for all other screened drugs.       PHQ-9, WANDER-7   PHQ-9 on 11/29/2019 WANDER-7 on 11/29/2019   The patient's PHQ-9 score was 11 out of 27, indicating moderate depression.   The patient's WANDER-7 score was 4 out of 21, indicating minimal anxiety.       Majestic-Suicide Severity Rating Scale Reassessment   Have you ever wished you were dead or that you could go to sleep and not wake up?  Past Month:  No     Have you actually had any thoughts of killing yourself?  Past Month:  No     Have you been thinking about how you might do this?     Past Month:   "No   Lifetime:  No   Have you had these thoughts and had some intention of acting on them?     Past Month:  No   Lifetime:  No   Have you started to work out the details of how to kill yourself?   Past Month:  No   Lifetime:  No   Do you intend to carry out this plan?   No     When you have the thoughts how long do they last?  The patient denied ever having any suicidal thoughts in life.     Are there things - anyone or anything (i.e. family, Taoist, pain of death) that stopped you from wanting to die or acting on thoughts of suicide?  Does not apply       2008  [facility]  Used with permission by Ade Sandoval, PhD.       Guide to C-SSRS Risk Ratings   NO IDEATION:  with no active thoughts IDEATION: with a wish to die. IDEATION: with active thoughts. Risk Ratings   If Yes No No 0 - Very Low Risk   If NA Yes No 1 - Low Risk   If NA Yes Yes 2 - Low/moderate risk   IDEATION: associated thoughts of methods without intent or plan INTENT: Intent to follow through on suicide PLAN: Plan to follow through on suicide Risk Ratings cont...   If Yes No No 3 - Moderate Risk   If Yes Yes No 4 - High Risk   If Yes Yes Yes 5 - High Risk   The patient's ADDITIONAL RISK FACTORS and lack of PROTECTIVE FACTORS may increase their overall suicide risk ratings.     Additional Risk Factors:    No additional risk factors   Protective Factors:    Having people in his/her life that would prevent the patient from considering a suicide attempt (i.e. young children, spouse, parents, etc.)     Having easy access to supportive family members     Risk Status   0. - Very Low Risk:  Evaluation Counselors:  Document in Epic / SBAR to counselor \"Very Low Risk\".      Treatment Counselors:  Reassess upon admission as applicable, assess weekly in progress notes under Dimension 3 and summarize in Discharge / Treatment summary under Dimension 3.     Additional information to support suicide risk rating: There was no " additional information to provide at this time.

## 2019-11-29 NOTE — PROGRESS NOTES
Name: Bebeto Alvarado  Date: 11/29/2019  Medical Record: [id number]    Envelope Number: [id number]    List of Contents (List each item separately in new row):     suboxone 8/2mg - 2 films     Admission:  I am responsible for any personal items that are not sent to the safe or pharmacy.  [place] is not responsible for loss, theft or damage of any property in my possession.      Patient Signature:  ___________________________________________       Date/Time:__________________________    Staff Signature: __________________________________       Date/Time:__________________________    2nd Staff person, if patient is unable/unwilling to sign:      __________________________________________________________       Date/Time: __________________________      Discharge:  [facility] has returned all of my personal belongings:    Patient Signature: ________________________________________     Date/Time: ____________________________________    Staff Signature: ______________________________________     Date/Time:_____________________________________

## 2019-11-29 NOTE — PROGRESS NOTES
Initial Services Plan        Service Initiation Date: 11/29/2019    Immediate health and/or safety concerns: No    Identify health and safety concern(s) below and include plan to address:    None Identified    Treatment suggestions for client during the time between intake (admit date) and completion of the individual treatment plan:     Look for a sober support network, i.e. 12 step, [facility], [facility], etc  Tour the treatment center or outpatient clinic  Introduce yourself to your treatment group. Spend time getting to know your peers  Review your patient or client handbook  Begin working on your treatment goal list    Completed by: YOVANA Valentino  Date completed: 11/29/2019 at 2:14 PM

## 2019-11-29 NOTE — PROGRESS NOTES
Name: Bebeto Alvarado  Date: 11/29/2019  Medical Record: [id number]    Envelope Number: [id number]    List of Contents (List each item separately in new row):     1 LG cell phone with cracked screen; 1 amna cell phone    Admission:  I am responsible for any personal items that are not sent to the safe or pharmacy.  [place] is not responsible for loss, theft or damage of any property in my possession.      Patient Signature:  ___________________________________________       Date/Time:__________________________    Staff Signature: __________________________________       Date/Time:__________________________    2nd Staff person, if patient is unable/unwilling to sign:      __________________________________________________________       Date/Time: __________________________      Discharge:  [facility] has returned all of my personal belongings:    Patient Signature: ________________________________________     Date/Time: ____________________________________    Staff Signature: ______________________________________     Date/Time:_____________________________________

## 2019-11-29 NOTE — PROGRESS NOTES
Patient:  Bebeto Alvarado    Date: November 29, 2019    Comprehensive Assessment UPDATE        Comprehensive Summary Update and Review  Counselor met with patient on 11/29/19 and reviewed the Comprehensive Assessment.    There were no changes/updates identified by patient or in chart entries.

## 2019-11-29 NOTE — PROGRESS NOTES
"Comprehensive Assessment Summary     Based on client interview, review of previous assessments and   comprehensive assessment interview the following diagnosis and recommendations are:     Patient: Bebeto Alvarado  MRN; [id number]   : 1990  Age: 29 year old Sex: male       Client meets criteria for:  304.00/F11.20, Opioid Use Disorder, Severe, Heroin  305.1/F17.200, Tobacco Use Disorder, Moderate    Dimension One: Acute Intoxication/Withdrawal Potential     Ratin  (Consider the client's ability to cope with withdrawal symptoms and current state of intoxication)     Pt is on Suboxone maintenance to manage withdrawal symptoms.    Dimension Two: Biomedical Condition and Complications    Ratin  (Consider the degree to which any physical disorder would interfere with treatment for substance abuse, and the client's ability to tolerate any related discomfort; determine the impact of continued chemical use on the unborn child if the client is pregnant)     No current conditions or concerns    Dimension Three: Emotional/Behavioral/Cognitive Conditions & Complications    Ratin  (Determine the degree to which any condition or complications are likely to interfere with treatment for substance abuse or with functioning in significant life areas and the likelihood of risk of harm to self or others)   Pt denies any SI/SIB/SAs. Pt was assessed for suicide risk and was assessed as low risk. Pt will continued to be monitored throughout time at  for change in risk severity rating and will complete a Safety Plan Template. Pt describes growing up in a \"solid\" and supportive family. Pt denies experiencing any trauma or abuse. Pt denies any mental health diagnoses but states that he struggles with symptoms of depression. Pt's PHQ-9 score was 16 out of 27, indicating moderately severe depression. Pt denies any family hx of substance abuse, mental health concerns, or suicide. Pt denies ever receiving treatment " "for MH concerns. Pt denies any hx of prescribed psychopharmaceuticals.     Dimension Four: Treatment Acceptance/Resistance     Ratin  (Consider the amount of support and encouragement necessary to keep the client involved in treatment)     Pt states that his drug use is a problem and that he is tired of living this way. Pt displays verbal motivation for recovery but has not displayed consistent actions and behavior in the past. Pt cites motivators as his family is not pleased, he is in debt financially, is unemployed, and is homeless. Pt spends almost all of his time using or getting money to use. Pt states his family says he is not the same when he uses and pt agrees. Despite consequences and admitting to self that use is problematic, pt has continued to use. Pt appears to be in the contemplation stage of change and lacks internal motivation for long-term sobriety.    Dimension Five: Continued Use/Relaspe Prevention     Ratin  (Consider the degree to which the client's recognizes relapse issues and has the skills to prevent relapse of either substance use or mental health problems)     Pt has attended one treatment in the past at [facility] and did not complete this treatment. Pt states he was kicked out of [facility] because they thought he was using and not complying with treatment. Pt denies this. Pt denies any history of 12 step or other support group attendance. Pt reports cravings to use substances on a daily basis. Pt states that the last time he returned to use he thought he could use in a controlled manner and would \"wean himself off.\" Pt appears to lack knowledge of the disease of addiction and its recovery process. Pt appears to have issues with denial and taking responsibility. Pt reports only very minimal periods of sobriety in the past. Pt appears to lack coping skills and awareness of the impact of his substance use. Pt appears to lack awareness of the relapse process as well as his personal " warning signs, triggers, and cues. Pt appears to be a high risk for relapse at this time.    Dimension Six: Recovery Environment     Ratin  (Consider the degree to which key areas of the client's life are supportive of or antagonistic to treatment participation and recovery)     Pt is currently homeless. Pt is currently unemployed and states he was fired last year due to use. Pt did not complete higher than 11th grade and has no GED. Pt states that sometimes stays with various friends and attempts to hide his use. Pt denies having any children and is not in a significant romantic relationship at this time. Pt identifies supportive relationship with his parents. Pt states that he needs sober housing and to attend support groups. Pt currently lacks a sober support network. Pt states he is on unsupervised probation in [facility] for counterfeiting prescription drugs resulting in a 5th degree possession. Pt reports family relationships are fractured and strained due to use.     I have reviewed the information on the assessment, psychosocial and medical history and checklist: it is current.

## 2019-11-29 NOTE — PROGRESS NOTES
Acknowledgement of Current Treatment Plan - Initial Treatment Plan     INITIAL TREATMENT PLAN:     1. I have participated in creating my treatment plan with my therapist / counselor on _11/29/19_.     I agree with the plan as it is written in the electronic health record.    Name Signature/Date   Patient     Name of Therapist / Counselor Signature/Date   Counselor/Therapist        2. I have completed and reviewed my Safety Plan with my counselor and signed this on _11/29/19_. I have been given the hard copy of this plan.    Patient signature/date:      _____________________________________________________________________________    3. Last Use Date: _11/14/19_    Patient signature/date:     _____________________________________________________________________________

## 2019-11-29 NOTE — PROGRESS NOTES
"[facility] Assessment      Vital signs:     BP (!) 142/86   Pulse 77   Ht 1.702 m (5' 7\")   Wt 68.9 kg (152 lb)   BMI 23.81 kg/m        Direct admission    Counselor: Marg[place]  Drug of Choice: Heroin  Last use: 11/27/19  Home clinic/MD: None  Psychiatrist/therapist: None    Medical history/current conditions:  High cholesterol from labs at detox.     H&P Screen:  H&P within the last 90 days: Yes.  Date: 11/18/19 Location:     Influenza vaccination: denies    Tobacco use assessment: Do you use tobacco? yes Type: ciggarette How often?: daily How much?: 1 ppd Are you interested in quitting?: Yes, pt brought in nicotine gum. Pt refused nicotine patches at this time. RN provided counseling on this also.    Mental Health diagnosis: depression and anxiety  Medication compliant?: Yes  Recent suicidal thoughts? No     When? N/A  Current thought of self-harm? N/A    Plan? N/A    Pain assessment:   Pt. Experiencing pain at this time?  No      Nursing Assessment Summary: Pt appears medically stable. Pt does endorse needing an appointment with addiction medicine for Suboxone maintenance. RN will facilitate this and provide pt with appt. Information.     On-going nursing intervention required?   No    Acute care visit recommended: no.      "

## 2019-11-29 NOTE — PROGRESS NOTES
[facility]  Adult Chemical Dependency Program  Treatment Plan Requirements    These services are provided by the facility for each patient/client according to the individual's treatment plan:    Individual and group counseling    Education    Transition services    Services to address any co-occurring mental illness    Service coordination    Initial Treatment Plan Goals:  1. Complete all the requirements of Program Orientation.  2. Maintain medication compliance throughout the program.  3. Complete requirements for workshop/skills groups based on identified issues on your problem list.  4. Complete the support group attendance feedback sheet weekly.  5. Gain family involvement in treatment process to address family issues from the problem list.  6. Attend and participate in all required groups per individual treatment plan.  7. Focus attention to individualized issues from the treatment plan.  8. Complete all requirements for UA's, alcohol screening tests and other testing.  9. Schedule a physical examination if recommended.    In addition to the above, complete all individual goals as specifically outlines on your treatment plan.    Criteria for discharge:  Patients/clients are discharged from the program following completion of the entire program including Phase I and II or acceptance of other post-treatment referrals such as [facility], or aftercare at other facilities.  Patients/clients may also be discharged for inappropriate behavior or chemical use.      Favorable Discharge - Patients/clients have completed agreed upon treatment goals, understand their diagnosis and appear motivated about the follow-up care.    Guarded Discharge - Patients/clients have demonstrated some understanding of their diagnosis and recovery process, and have completed some of their treatment goals.  This prognosis also includes patients/clients who have completed some treatment goals but have not made  commitment to community support or follow through with referrals.    Unfavorable Discharge - Patients/clients have not completed agreed upon treatment goals due to their own choice, have limited understanding of their diagnosis, and have shown minimal or inconsistent behavior conducive to recovery.  Those patients/clients discharged due to behavioral problems will also be unfavorable discharges.                                  Adult CD Treatment Plan     Bebeto Alvarado [id number]   1990 29 year old male        ------------------------------------------------------------------------------------------------------------------  Acute Intoxication/Withdrawal Potential     DIMENSION 1  RISK FACTOR: 1             AssignmentDate Source Prob/Goal/  Intervention Target  Date Initials Outcome Completion  Date   11/29/19 Self -  Current and Assessment -  Current  Problem: Patient is on Suboxone maintenance.  Goal: Be able to manage mild to moderate withdrawal symptoms.  Intervention: Report to nurse any increase in withdrawal symptoms. Ongoing through 12/27/19 TE Effective 12/30/19         Biomedical Conditions and Complaints     DIMENSION 2  RISK FACTOR: 0             AssignmentDate Source Prob/Goal/  Intervention Target  Date Initials Outcome Completion  Date   11/29/19 Self -  Current and Assessment -  Current  Problem: None  Goal: None  Intervention: None N/A TE         -----------------------------------------------------------------------------------------------------------------    Emotional/Behavioral/Cognitive Conditions and Complications     DIMENSION 3  RISK FACTOR: 2             AssignmentDate Source Prob/Goal/  Intervention Target  Date Initials Outcome Completion  Date   11/29/19 Self -  Current and Assessment -  Current  Problem: Pt reports symptoms of depression but has never had a mental health assessment  Goal: Obtain a mental health assessment  Intervention:  Attend an appointment with [facility] staff  "psychotherapist to obtain an assessment and manage mental health 12/6/19 TE Effective 12/30/19 11/29/19  Self - Current, Assessment - Current Problem: Patient participated in a suicide risk screening and was rated as being at a low risk for suicide.   Goal: Patient will remain safe in treatment.        1. Patient will be aware of warning signs for self-harm and report any new symptoms or increase in depression/anxiety ASAP.    2. Pt will be monitored and reassessed if change in risk rating is indicated.  Ongoing through 12/27/19 TE 1. & 2. Effective 1. & 2. 12/30/19       -------------------------------------------------------------------------------------------------------------------     Readiness to Change     DIMENSION 4  RISK FACTOR: 2             AssignmentDate Source Prob/Goal/  Intervention Target  Date Initials Outcome Completion  Date   11/29/19 Self -  Current and Assessment -  Current  Problem: {Pt lacks acceptance of powerlessness related to addiction  Goal: Admit powerlessness related to addiction  Intervention: Complete the \"First Step Assignment\" and share in group 12/2/19 TE Effective 12/2/19 11/29/19 Self -  Current and Assessment -  Current  Problem: {Pt lacks understanding of addiction as a disease  Goal: Understand that addiction is a disease  Intervention: Read \"Addictive Disease.\" Write a 2 page reflection on what the disease of addiction is and what is necessary for recovery. Share in group 12/4/19 TE Effective 12/25/19 11/29/19 Self -  Current and Assessment -  Current  Problem: {Pt has continued to use despite increasing consequences  Goal: Understand that addiction and its consequences are progressive  Intervention: Complete the \"Drug Use History\" assignment and share in group 12/6/19 TE Effective 12/10/19     11/29/19 Self -  Current and Assessment -  Current  Problem: {Pt continues to use despite the impact of substance use  Goal: Understand the impact of your substance " "use  Intervention: Complete \"What Price Am I Willing to Pay?\" and share in group 12/9/19 TE Effective 12/17/19 11/29/19 Self -  Current and Assessment -  Current  Problem: {Pt lacks connection with spirituality and a higher power  Goal: Connect with your spirituality.  Intervention: Read \"Step Two.\" Complete the corresponding \"Step Two - Finding Hope\" packet and share in group 12/18/19 TE Effective 12/23/19 11/29/19 Self -  Current and Assessment -  Current  Problem: {Pt has never committed to the actions necessary for long-term recovery  Goal: Commit to what is necessary for long-term recovery  Intervention: Complete \"Step Three: Deciding to Turn it Over\" and share in group 12/23/19 TE Effective 12/27/19       -------------------------------------------------------------------------------------------------------------------     Relapse/Continues Use/Continues Problem Potential     DIMENSION 5  RISK FACTOR: 4                 AssignmentDate Source Prob/Goal/  Intervention Target  Date Initials Outcome Completion  Date   11/29/19 Self -  Current, History -  Current and Assessment -  Current  Problem: Pt has not taken responsibility for his recovery  Goal: Take responsibility for the effects of your addiction and what is necessary to recover  Intervention: Read \"Taking Responsibility.\" Write a 2 page reflection on what you have not taken responsibility for in the past and how you will be responsible for your recovery moving forward 12/11/19 TE Effective 12/11/19 11/29/19 Self -  Current and Assessment -  Current  Problem: Pt has attitudes that have led to relapse  Goal: Identify attitudes that have led to relapse  Intervention: Complete \"An Attitude Inventory for people who relapse quickly after treatment\" and share in group 12/16/19 TE Effective 12/16/19 11/29/19 Self -  Current and Assessment -  Current  Problem: Pt has patterns of dishonesty that have sabotaged previous attempts at recovery  Goal: " "Connect with honesty as a necessary asset in recovery  Intervention: Read the packet \"Honesty.\" Write a 2 page reflection on why honesty is essential in recovery and what you can hope for if you are honest. 12/20/19 TE Effective 12/20/19 11/29/19 Self -  Current and Assessment -  Current  Problem: Pt has resentments that have sabotaged recovery  Goal: Identify and process your resentments  Intervention: Read \"Getting Rid of Resentments.\" Write down your major resentments, including why you resent this person/organization/idea, what you felt you were owed that you did not receive, and what things you did that were wrong in the situation 12/26/19 TE Effective 12/26/19 11/29/19 Self -  Current and Assessment -  Current  Problem: Client/Patient does not recognize relapse triggers and warning signs.     Goal: Identify warning signs and triggers and build a plan of recovery  Intervention: Complete \"Putting it All Together\" and share in group 12/27/19 TE Effective 12/27/19         Recovery Environment     DIMENSION 6  RISK FACTOR: 4                 AssignmentDate Source Prob/Goal/  Intervention Target  Date Initials Outcome Completion  Date   11/29/19 Self -  Current and Assessment -  Current  Problem: Patient is homeless.      Goal: Find sober housing.  Intervention: Meet with  staff  to find sober housing and build a plan of aftercare Ongoing through 12/27/19 TE Effective 12/30/19 11/29/19 Self -  Current and Assessment -  Current  Problem: Lacks sober support network.     Goal: Develop a sober support network.  Intervention: Attend 5 12 step meetings per week while in  and obtain a sponsor Ongoing through 12/27/19 TE Effective 12/30/19       Individual abuse prevention plan (required for [facility]) : specific actions, referral:   No additional protection measures required other than the Program Abuse Prevention Plan - No additional protection measures needed    All interventions that are " "designated as \"current\" will need to be completed in order to transition out of treatment with a favorable prognosis. The treatment plan is a fluid document and a work in progress. Interventions and goals may be added at any time to customize the plan to each individual's needs. Patients may work with counselors to change interventions as long as they pertain to the goals stipulated in the plan and/or are clinically driven.      HPI      ROS      Physical Exam      "

## 2019-11-30 ENCOUNTER — HOSPITAL ENCOUNTER (OUTPATIENT)
Dept: BEHAVIORAL HEALTH | Facility: CLINIC | Age: 29
End: 2019-11-30
Attending: FAMILY MEDICINE
Payer: MEDICAID

## 2019-11-30 PROCEDURE — 10020000 ZZH LODGING PLUS FACILITY CHARGE ADULT

## 2019-11-30 PROCEDURE — H2035 A/D TX PROGRAM, PER HOUR: HCPCS | Mod: HQ

## 2019-11-30 ASSESSMENT — ANXIETY QUESTIONNAIRES: GAD7 TOTAL SCORE: 4

## 2019-12-01 ENCOUNTER — HOSPITAL ENCOUNTER (OUTPATIENT)
Dept: BEHAVIORAL HEALTH | Facility: CLINIC | Age: 29
End: 2019-12-01
Attending: FAMILY MEDICINE
Payer: MEDICAID

## 2019-12-01 PROBLEM — F19.20 CHEMICAL DEPENDENCY (H): Status: ACTIVE | Noted: 2019-12-01

## 2019-12-01 PROCEDURE — H2035 A/D TX PROGRAM, PER HOUR: HCPCS | Mod: HQ

## 2019-12-01 PROCEDURE — 10020000 ZZH LODGING PLUS FACILITY CHARGE ADULT

## 2019-12-02 ENCOUNTER — HOSPITAL ENCOUNTER (OUTPATIENT)
Dept: BEHAVIORAL HEALTH | Facility: CLINIC | Age: 29
End: 2019-12-02
Attending: FAMILY MEDICINE
Payer: MEDICAID

## 2019-12-02 PROCEDURE — H2035 A/D TX PROGRAM, PER HOUR: HCPCS | Mod: HQ

## 2019-12-02 PROCEDURE — 10020000 ZZH LODGING PLUS FACILITY CHARGE ADULT

## 2019-12-02 PROCEDURE — H2035 A/D TX PROGRAM, PER HOUR: HCPCS

## 2019-12-03 ENCOUNTER — HOSPITAL ENCOUNTER (OUTPATIENT)
Dept: BEHAVIORAL HEALTH | Facility: CLINIC | Age: 29
End: 2019-12-03
Attending: FAMILY MEDICINE
Payer: MEDICAID

## 2019-12-03 PROCEDURE — 10020000 ZZH LODGING PLUS FACILITY CHARGE ADULT

## 2019-12-03 PROCEDURE — H2035 A/D TX PROGRAM, PER HOUR: HCPCS

## 2019-12-03 PROCEDURE — H2035 A/D TX PROGRAM, PER HOUR: HCPCS | Mod: HQ

## 2019-12-04 ENCOUNTER — HOSPITAL ENCOUNTER (OUTPATIENT)
Dept: BEHAVIORAL HEALTH | Facility: CLINIC | Age: 29
End: 2019-12-04
Attending: FAMILY MEDICINE
Payer: MEDICAID

## 2019-12-04 ENCOUNTER — OFFICE VISIT (OUTPATIENT)
Dept: ADDICTION MEDICINE | Facility: CLINIC | Age: 29
End: 2019-12-04
Payer: MEDICAID

## 2019-12-04 VITALS
OXYGEN SATURATION: 98 % | DIASTOLIC BLOOD PRESSURE: 58 MMHG | HEIGHT: 67 IN | WEIGHT: 153 LBS | TEMPERATURE: 98 F | BODY MASS INDEX: 24.01 KG/M2 | SYSTOLIC BLOOD PRESSURE: 102 MMHG | HEART RATE: 78 BPM

## 2019-12-04 DIAGNOSIS — F33.1 MODERATE EPISODE OF RECURRENT MAJOR DEPRESSIVE DISORDER (H): ICD-10-CM

## 2019-12-04 DIAGNOSIS — F11.20 OPIOID USE DISORDER, MODERATE, DEPENDENCE (H): Primary | ICD-10-CM

## 2019-12-04 DIAGNOSIS — G47.00 INSOMNIA, UNSPECIFIED TYPE: ICD-10-CM

## 2019-12-04 LAB
AMPHETAMINES UR QL: NOT DETECTED NG/ML
BARBITURATES UR QL SCN: NOT DETECTED NG/ML
BENZODIAZ UR QL SCN: NOT DETECTED NG/ML
BUPRENORPHINE UR QL: ABNORMAL NG/ML
CANNABINOIDS UR QL: NOT DETECTED NG/ML
COCAINE UR QL SCN: NOT DETECTED NG/ML
D-METHAMPHET UR QL: NOT DETECTED NG/ML
METHADONE UR QL SCN: NOT DETECTED NG/ML
OPIATES UR QL SCN: NOT DETECTED NG/ML
OXYCODONE UR QL SCN: NOT DETECTED NG/ML
PCP UR QL SCN: NOT DETECTED NG/ML
PROPOXYPH UR QL: NOT DETECTED NG/ML
TRICYCLICS UR QL SCN: NOT DETECTED NG/ML

## 2019-12-04 PROCEDURE — 80306 DRUG TEST PRSMV INSTRMNT: CPT | Performed by: NURSE PRACTITIONER

## 2019-12-04 PROCEDURE — 10020000 ZZH LODGING PLUS FACILITY CHARGE ADULT

## 2019-12-04 PROCEDURE — H2035 A/D TX PROGRAM, PER HOUR: HCPCS

## 2019-12-04 PROCEDURE — H2035 A/D TX PROGRAM, PER HOUR: HCPCS | Mod: HQ

## 2019-12-04 PROCEDURE — 99214 OFFICE O/P EST MOD 30 MIN: CPT | Performed by: FAMILY MEDICINE

## 2019-12-04 RX ORDER — LANOLIN ALCOHOL/MO/W.PET/CERES
6 CREAM (GRAM) TOPICAL AT BEDTIME
Qty: 60 TABLET | Refills: 1 | Status: SHIPPED | OUTPATIENT
Start: 2019-12-04 | End: 2020-02-03

## 2019-12-04 RX ORDER — BUPRENORPHINE AND NALOXONE 4; 1 MG/1; MG/1
1 FILM, SOLUBLE BUCCAL; SUBLINGUAL 2 TIMES DAILY
Qty: 30 FILM | Refills: 0 | Status: SHIPPED | OUTPATIENT
Start: 2019-12-04 | End: 2019-12-18

## 2019-12-04 RX ORDER — BUPROPION HYDROCHLORIDE 150 MG/1
150 TABLET ORAL EVERY MORNING
Qty: 15 TABLET | Refills: 0 | Status: SHIPPED | OUTPATIENT
Start: 2019-12-04 | End: 2019-12-26

## 2019-12-04 RX ORDER — TRAZODONE HYDROCHLORIDE 50 MG/1
50-100 TABLET, FILM COATED ORAL
Qty: 30 TABLET | Refills: 1 | Status: SHIPPED | OUTPATIENT
Start: 2019-12-04 | End: 2019-12-18

## 2019-12-04 ASSESSMENT — MIFFLIN-ST. JEOR: SCORE: 1612.63

## 2019-12-04 NOTE — PROGRESS NOTES
Patient:  Bebeto Alvarado            Adult CD Progress Note and Treatment Plan Review     Attendance  Please refer to OP BE[facility] CD Adult Attendance Record Documentation Flowsheet    Support group attended this week: yes    Reporting sobriety:  yes    Treatment Plan     Treatment Plan Review competed on: 12/4/19       Client preferred learning style: Visual  Hands on  Verbal    Staff Members contributing Misael Sawant [facility], Ayaz Childs [facility], Aura Mccoy [facility], Abe Klein [facility] Intern                     Received Supervision: no    Client: contributed to goals and plan.    Client received copy of plan/revised plan: Yes    Client agrees with plan/revised plan: Yes        Changes to Treatment Plan: No    New Goals added since last review First review; all goals are new    Goals worked on since last review Relationships in sobriety, increasing internal motivation, accepting powerlessness relating to addiction, sober coping skills, understand addiction as a disease, aftercare planning, building sober support    Strategies effective: yes    Strategies need these changes: None needed currently    1) Care Coordination Activities:  Pt began discussion of aftercare plans with counseling staff, desires sober housing, referred to  staff   2) Medical, Mental Health and other appointments the client attended: Pt met with  staff RN for health assessment. Pt has been referred to  staff psychotherapist to manage mental health.  3) Medication issues: None  4) Physical and mental health problems: None interfering with treatment  5) Any changes in Vulnerable Adult Status?  No If yes, add to treatment plan and individual abuse prevention plan.  6) Review and evaluation of the individual abuse prevention plan: Current IAPP for this program is adequate for this client          ASAM Risk Rating:    Dimension 1 1 Pt is on Suboxone maintenance to manage withdrawal symptoms    Dimension 2 0 No issue     Dimension 3 2 Pt denies SI/SIB. Pt has never received a MH diagnosis and reports symptoms of depression. Pt has been referred to  staff psychotherapist to assess and manage mental health concerns.     Dimension 4 2 Pt has been attending scheduled groups and lectures and has also arrived late to scheduled treatment activities a few times. Pt verbalizes motivation but lacks consistent behavior. Staff spoke with Pt to offer support and communicate clear behavioral expectations. Pt has been working to increase internal motivation and accept powerlessness related to addiction by completing his First Step Assignment. Pt has been working to increase internal motivation and understand addiction as a disease by completing his Addictive Disease assignment.    Dimension 5 4 Pt attended a weekend workshop on Relationships in Sobriety to learn necessary communication and relationship skills to support long-term recovery and prevent relapse. Pt has upcoming assignments targeting issues of relapse.    Dimension 6 4 Pt has been working to build sober support by attending 5 12 step or other support meetings per week. Pt has begun discussing aftercare with counseling staff. Pt states that he would like sober housing and treatment options such as extended care or outpatient. Pt has been referred to  staff  to build an aftercare plan and obtain necessary referrals.      Guide to Risk Ratings for Suicidality:   IDEATION: Active thoughts of suicide? INTENT: Intent to follow on suicide? PLAN: Plan to follow through on suicide? Level of Risk:   IF Yes Yes Yes Patient = High Emergent   IF Yes Yes No Patient = High Urgent/Non-Emergent   IF Yes No No Patient = Moderate Non-Urgent   IF No No   No Patient = Low Risk   The patient's ADDITIONAL RISK FACTORS and lack of PROTECTIVE FACTORS may increase their overall suicide risk ratings.     Patient's/client's current risk rating:  Low Risk    Family Involvement:   none schedule  this week    Data:   offered feedback client did participate      Intervention:   Aftercare planning  Behavior modification  Counselor feedback  Education  Emotional management  Group feedback  Motivational Enhancement Therapy  Relapse prevention  Twelve Step facilitation  Mental health education      Assessment:   Stages of Change Model  Contemplation    Appears/Sounds:  Cooperative  Engaged  Currently lacking consistent behavior      Plan:  Next treatment task referral out  Focus on recovery environment  Monitor emotional/physical health  Behavioral expectations      YOVANA Aaron Intern

## 2019-12-04 NOTE — PROGRESS NOTES
"  SUBJECTIVE:                                                    BUPRENORPHINE FOLLOW UP:    Bebeto Alvarado is a 29 year old male who presents to clinic today for follow up of Buprenorphine.      [facility]    Date of last visit:  11/21/2019      HPI:    12/4/2019  - SE - none  - Depression on his mind. HAs been depressed since teenage years. Has tried wellbutrin as a teenager, worked well but had SE's (gut rot, for instance). Willing to look into this, hasn't tried other agents.  PHQ9 - 14 today, logged in flowsheets    - Anxiety also a chronic issue but \"it's manageable\" and he hasn't been interested in treating this specifically  - Sleep is tough. He takes melatonin and sleepy tea but tosses/turns nightly. Has experienced good sleep in the past, this wasn't an issue before he started using opioids basically daily for 5 years. Was abstinent for 4 months in 2018, can't recall how sleep was during that time.  - Chewing nicotine gum, still smoking some but cutting down slowly. Goal is to cut down and eventually quit, but hasn't set a quit date. Doesn't like chantix, dreams were awful and depression worsened (only time in his life he had SI)      Brief History (incl. Social history)  Social History     Social History Narrative    Current living situation: currently [place] with his mom [place] the [place] of [place] while waiting to get into treatment; prior to that was living with a friend but after getting into an argument with his roommate he ended up staying on other people's couches for 2 weeks before going into detox    Marital status: single, never     Children: none    Employment/financial status: Unemployed currently; dropped out of high school senior year - was smoking THC; no GED but wants to get in future; previous jobs include  for [employer] - lost job for using as he was \"slacking\" at work; he reports interest in working in customer service or sales in the " "future    Current recovery program (meetings, sponsorship): waiting to get into [facility] - plans to attend NA meetings with a friend before admission    Transportation: own car and license    Legal hx: on probation for 5th degree possession of a fake prescription for opioids that a \"friend\" talked him into trying to get filled at the pharmacy; 1 year of probation left; no pending court dates         REVIEW OF SYSTEMS:  Complete, 10 point ROS completed. Negative except:  Depression as above    Problem list reviewed & adjusted, as indicated.  Patient Active Problem List    Diagnosis Date Noted     Chemical dependency (H) 12/01/2019     Priority: Medium     Substance abuse withdrawal with complication (H) 11/14/2019     Priority: Medium     Attention deficit disorder 12/15/2004     Priority: Medium     Epic       Cannabis abuse 12/15/2004     Priority: Medium     Epic         PM, [facility] reviewed and updated in Epic.    MEDICATION LIST (prior to visit)  acetaminophen (TYLENOL) 325 MG tablet, Take 325-650 mg by mouth every 4 hours as needed for mild pain  alum & mag hydroxide-simethicone (MYLANTA/MAALOX) 200-200-20 MG/5ML SUSP suspension, Take 30 mLs by mouth every 6 hours as needed for indigestion  buprenorphine HCl-naloxone HCl (SUBOXONE) 4-1 MG per film, Place 1 Film under the tongue 2 times daily  guaiFENesin (ROBITUSSIN) 20 mg/mL SOLN solution, Take 10 mLs by mouth every 4 hours as needed for cough  ibuprofen (ADVIL/MOTRIN) 200 MG tablet, Take 400 mg by mouth every 6 hours as needed for mild pain  loratadine (CLARITIN) 10 MG tablet, Take 10 mg by mouth daily as needed for allergies  melatonin 3 MG tablet, Take 3 mg by mouth nightly as needed for sleep  nicotine (NICORETTE) 2 MG gum, Place 1-2 each (2-4 mg) inside cheek as needed for smoking cessation  phenol-menthol (CEPASTAT) 14.5 MG lozenge, Place 1 lozenge inside cheek every 2 hours as needed for moderate pain  senna-docusate (SENOKOT-S/PERICOLACE) 8.6-50 MG " "tablet, Take 2 tablets by mouth daily as needed for constipation    Self Administer Medications: Behavioral Services        Allergies   Allergen Reactions     Codeine Unknown     Pt has been told.             OBJECTIVE:    PHYSICAL EXAM:  /58   Pulse 78   Temp 98  F (36.7  C) (Oral)   Ht 1.702 m (5' 7\")   Wt 69.4 kg (153 lb)   SpO2 98%   BMI 23.96 kg/m      GENERAL APPEARANCE:  alert, comfortable appearing  EYES:Eyes grossly normal to inspection  NEURO:  Gait normal.  No tremor. Coordination intact.   MENTAL STATUS EXAM:  Appearance/Behavior: No appearant distress  Speech: Normal  Mood/Affect: Depressed  Insight: Adequate      No results found for any visits on 12/04/19.        ASSESSMENT/PLAN:  Bebeto was seen today for drug problem.    Diagnoses and all orders for this visit:    Opioid use disorder, moderate, dependence (H)  #28 films dispensed. Will f/u with Cindy Coppola in 2 weeks. No cravings, no SE. Working in [facility] program currently. Will likely transition to sober housing.    -     Urine Drugs of Abuse Screen Panel 13  -     buprenorphine HCl-naloxone HCl (SUBOXONE) 4-1 MG per film; Place 1 Film under the tongue 2 times daily    Moderate episode of recurrent major depressive disorder (H)  PHQ-9 score 14 today, see flowsheets for details. Has experienced depression from a young age, teenage years, before he started using substances regularly. Tried wellbutrin with some good effect. This would be appropriate given his concern for possible untreated ADHD (prescribed stimulants as a teenager as well, didn't like them). Would recommend possibly increasing dose at 2wk if tolerating well w/o SE's.    -     buPROPion (WELLBUTRIN XL) 150 MG 24 hr tablet; Take 1 tablet (150 mg) by mouth every morning    Insomnia, unspecified type  Increased melatonin dose to 6mg (up from 3mg). Cannot fall asleep. Encouraged him to work on mindfulness activities at bedtime, possibly journaling. Will provide PRN trazodone " as adjunct as well, encouraged him to use this sparingly if possible with goal to wean eventually.    -     melatonin 3 MG tablet; Take 2 tablets (6 mg) by mouth At Bedtime  -     traZODone (DESYREL) 50 MG tablet; Take 1-2 tablets ( mg) by mouth nightly as needed for sleep          ENCOUNTER FOR LONG TERM USE OF HIGH RISK MEDICATION   High Risk Drug Monitoring?  YES   Drug being monitored: Buprenorphine   Reason for drug: Opioid Use Disorder   What is being monitored?: Dosage, Cravings, Trigger, side effects, and continued abstinence.   Patient has narcan supply at home    [place] Board of Pharmacy Data Base Reviewed:    Yes ;    Consistent with patient reports and Epic records.    Counseled the patient on the importance of having a recovery program in addition to medication to manage recovery.  Components include avoiding isolating, having willingness to change, avoiding triggers and managing cravings. Encouraged having some type of sober network and practicing honesty with trusted support person(s). Encouraged other services such as counseling, 12 step or other self-help organizations.      Opioid warning reviewed.  Risk of overdose following a period of abstinence due to decrease tolerance was discussed including risk of death.  Strongly recommended abstain from alcohol, benzodiazepines, THC, opioids and other drugs of abuse.  Increased risk of return to opioid use after use of these substances discussed.  Increased risk of overdose/death with use of other substances particularly benzodiazepines/alcohol reviewed.        RTC 2 weeks      Yoseph Melo MD  [facility]  [phone]

## 2019-12-05 ENCOUNTER — HOSPITAL ENCOUNTER (OUTPATIENT)
Dept: BEHAVIORAL HEALTH | Facility: CLINIC | Age: 29
End: 2019-12-05
Attending: FAMILY MEDICINE
Payer: MEDICAID

## 2019-12-05 PROCEDURE — 10020000 ZZH LODGING PLUS FACILITY CHARGE ADULT

## 2019-12-05 PROCEDURE — H2035 A/D TX PROGRAM, PER HOUR: HCPCS | Mod: HQ

## 2019-12-05 PROCEDURE — H2035 A/D TX PROGRAM, PER HOUR: HCPCS

## 2019-12-05 ASSESSMENT — PATIENT HEALTH QUESTIONNAIRE - PHQ9: SUM OF ALL RESPONSES TO PHQ QUESTIONS 1-9: 14

## 2019-12-05 NOTE — PROGRESS NOTES
"INDIVIDUAL SESSION SUMMARY    D) Met with client on 12/5/19 from 1:30-2:30. Client reported a mental health diagnosis of: ADHD from childhood and some symptoms of depression. Client reported to resume his anti-depressant medication today. Client reported no prior therapy experience. Client reported he is not in a relationship and has no children. 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 Client reported mood has been: less depressed. Client reported no issues with sleep as of last night with new medications. Client identified resources including: friends, parents, brother, and sober peers in this program. Client identified strengths including: hard working, strong-willed, family involvement, and readiness to learn. Client spoke of interests including: basketball. Client reported self-care activities including: spending time with his peers in this program. Client spoke about childhood including: growing up with both parents until their divorce when he was in elementary school and that he's had a close relationship to his older brother. Client shared that there is a family hx of addiction including: his mom had an addiction to pills when he was high school, brother had addiction to cocaine and now abuses alcohol, brother abuses alcohol, and aunt has a crack cocaine problem. Client reported to have moved out at age 17 and having lived independently until he was terminated from his job last year. Client spoke of stressors including: lost dreams, strained family relationships, unemployment, homelessness, and feeling \"like a failure\". Client spoke of accepting that he will give up all substances including alcohol.     I) Individual session with client. Provided client with verbal interventions including: validation, nurturing, compassion and support.  Discussed the benefits of: " positive self-talk, gratitude, and self-compassion.    A) Client appears to be in touch with his feelings of guilt and shame. Client appears to struggle with emotional regulation and stress management. Client appears to struggle with regulating impulses and focusing attention. Client appears to have a small sober support network. Client appears to lack a daily routine, meaningful activities, and a sense of purpose. Client appears to have internal motivation at this time and would benefit from continuing support to help with processing feelings of shame, stress management, emotional regulation, impulse control, increasing resiliency and self-esteem.     P) Next session is scheduled for 12/12/19.     SARA Pace  12/5/2019

## 2019-12-06 ENCOUNTER — HOSPITAL ENCOUNTER (OUTPATIENT)
Dept: BEHAVIORAL HEALTH | Facility: CLINIC | Age: 29
End: 2019-12-06
Attending: FAMILY MEDICINE
Payer: MEDICAID

## 2019-12-06 PROCEDURE — H2035 A/D TX PROGRAM, PER HOUR: HCPCS | Mod: HQ

## 2019-12-06 PROCEDURE — 10020000 ZZH LODGING PLUS FACILITY CHARGE ADULT

## 2019-12-07 ENCOUNTER — HOSPITAL ENCOUNTER (OUTPATIENT)
Dept: BEHAVIORAL HEALTH | Facility: CLINIC | Age: 29
End: 2019-12-07
Attending: FAMILY MEDICINE
Payer: MEDICAID

## 2019-12-07 PROCEDURE — H2035 A/D TX PROGRAM, PER HOUR: HCPCS | Mod: HQ

## 2019-12-07 PROCEDURE — 10020000 ZZH LODGING PLUS FACILITY CHARGE ADULT

## 2019-12-08 ENCOUNTER — HOSPITAL ENCOUNTER (OUTPATIENT)
Dept: BEHAVIORAL HEALTH | Facility: CLINIC | Age: 29
End: 2019-12-08
Attending: FAMILY MEDICINE
Payer: MEDICAID

## 2019-12-08 PROCEDURE — H2035 A/D TX PROGRAM, PER HOUR: HCPCS | Mod: HQ

## 2019-12-08 PROCEDURE — 10020000 ZZH LODGING PLUS FACILITY CHARGE ADULT

## 2019-12-09 ENCOUNTER — HOSPITAL ENCOUNTER (OUTPATIENT)
Dept: BEHAVIORAL HEALTH | Facility: CLINIC | Age: 29
End: 2019-12-09
Attending: FAMILY MEDICINE
Payer: MEDICAID

## 2019-12-09 DIAGNOSIS — B02.8 HERPES ZOSTER WITH COMPLICATION: Primary | ICD-10-CM

## 2019-12-09 DIAGNOSIS — B02.9 HERPES ZOSTER WITHOUT COMPLICATION: Primary | ICD-10-CM

## 2019-12-09 PROCEDURE — 10020000 ZZH LODGING PLUS FACILITY CHARGE ADULT

## 2019-12-09 PROCEDURE — H2035 A/D TX PROGRAM, PER HOUR: HCPCS

## 2019-12-09 PROCEDURE — H2035 A/D TX PROGRAM, PER HOUR: HCPCS | Mod: HQ

## 2019-12-09 RX ORDER — CAPSAICIN 0.025 %
CREAM (GRAM) TOPICAL
Qty: 1 TUBE | Refills: 1 | Status: SHIPPED | OUTPATIENT
Start: 2019-12-09 | End: 2019-12-26

## 2019-12-09 RX ORDER — VALACYCLOVIR HYDROCHLORIDE 1 G/1
1000 TABLET, FILM COATED ORAL 3 TIMES DAILY
Qty: 21 TABLET | Refills: 0 | Status: SHIPPED | OUTPATIENT
Start: 2019-12-09 | End: 2019-12-18

## 2019-12-09 NOTE — PROGRESS NOTES
Patient:  Bebeto Alvarado            Adult CD Progress Note and Treatment Plan Review     Attendance  Please refer to OP BEH CD Adult Attendance Record Documentation Flowsheet    Support group attended this week: yes    Reporting sobriety:  yes    Treatment Plan     Treatment Plan Review competed on: 12/9/19       Client preferred learning style: Visual  Hands on  Verbal    Staff Members contributing Misael Sawant [facility], Ayaz Childs [facility], Aura Mccoy [facility], Abe Klein [facility] Intern                         Received Supervision: no    Client: contributed to goals and plan.    Client received copy of plan/revised plan: Yes    Client agrees with plan/revised plan: Yes      Changes to Treatment Plan: No    New Goals added since last review: None    Goals worked on since last review: Motivation, Change, Relapse Prevention, Relationships with DOC, Self Esteem, Mental Health, Moment of Truth, Blind-Sided By DOC    Strategies effective: yes    Strategies need these changes: None needed currently    1) Care Coordination Activities:  Pt began discussion of aftercare plans with counseling staff, desires sober housing, referred to  staff   2) Medical, Mental Health and other appointments the client attended: Pt met with  staff RN for health assessment. Pt has been referred to  staff psychotherapist to manage mental health.  3) Medication issues: None  4) Physical and mental health problems: None interfering with treatment  5) Any changes in Vulnerable Adult Status?  No If yes, add to treatment plan and individual abuse prevention plan.  6) Review and evaluation of the individual abuse prevention plan: Current IAPP for this program is adequate for this client      ASAM Risk Rating:    Dimension 1 1 Patient continues his Suboxone maintenance protocol and reports no additional with withdrawal symptomology.      Dimension 2 0 Patient reports no biomedical issues or concerns at this time.       "Dimension 3 2 Patient reports attending meeting with psychotherapist this week with positive results. Patient reports no suicidal ideation at this time.      Dimension 4 2 Patient is continuing to work on his internal motivation fore change. Patient is working on his discipline regarding showing up to all meetings and lectures in a timely fashion.  Patient completed his \"Addcitive Disease\" assignment and presented in group. Patient also completed his \"Drug Use History\" assignment and presented in group. Patient is working on his \"What Price Am I Willing to Pay?\" assignment and will present in group when complete.      Dimension 5 4 Patient is continuing to work on his triggers, cues, and early signs for relapse. Patient is working on his \"Taking Responsibility\" assignment and will present in group when complete. Patient is also working on his \"An Attitude Inventory\" assignment and will present in group when complete.      Dimension 6 4 Patient reports attending 5 12 Step meetings this week according to his treatment plan objectives. Patient will continue to work on obtaining a sponsor while in treatment. Patient will continue to work with counselors and  to develop an aftercare treatment program.      Guide to Risk Ratings for Suicidality:   IDEATION: Active thoughts of suicide? INTENT: Intent to follow on suicide? PLAN: Plan to follow through on suicide? Level of Risk:   IF Yes Yes Yes Patient = High Emergent   IF Yes Yes No Patient = High Urgent/Non-Emergent   IF Yes No No Patient = Moderate Non-Urgent   IF No No   No Patient = Low Risk   The patient's ADDITIONAL RISK FACTORS and lack of PROTECTIVE FACTORS may increase their overall suicide risk ratings.     Patient's/client's current risk rating:  Low Risk    Family Involvement:   none schedule this week    Data:   offered feedback client did participate      Intervention:   Aftercare planning  Behavior modification  Counselor " feedback  Education  Emotional management  Group feedback  Motivational Enhancement Therapy  Relapse prevention  Twelve Step facilitation  Mental health education      Assessment:   Stages of Change Model  Contemplation    Appears/Sounds:  Cooperative  Engaged  Currently lacking consistent behavior      Plan:  Next treatment task referral out  Focus on recovery environment  Monitor emotional/physical health  Behavioral expectations      YOVANA Sullivan

## 2019-12-09 NOTE — PROGRESS NOTES
NOT FOR BILLING PURPOSES    Patient seen briefly on unit, nurse reporting rash on chest. Painful, itchy, worsening pain radiating to outer chest. Started Friday. History of shingles in middle school. Had started wellbutrin the day prior to noticing the rash.    Exam shows red raised nodules, no clear vesicular formation, confluent erythematous patch surrounding nodules, roughly 5-6cm in diameter on left breast.    A/P  - Appears to be possible shingles rash  - Nursing staff to be in contact with infectious control  - Start valacyclovir 1g TID for 7 days  - Add capsaicin cream PRN  - Recheck at next outpt appt.  - Stop wellbutrin, may attempt restart once rash clears.    Yoseph Melo MD

## 2019-12-10 ENCOUNTER — HOSPITAL ENCOUNTER (OUTPATIENT)
Dept: BEHAVIORAL HEALTH | Facility: CLINIC | Age: 29
End: 2019-12-10
Attending: FAMILY MEDICINE
Payer: MEDICAID

## 2019-12-10 PROCEDURE — H2035 A/D TX PROGRAM, PER HOUR: HCPCS

## 2019-12-10 PROCEDURE — H2035 A/D TX PROGRAM, PER HOUR: HCPCS | Mod: HQ

## 2019-12-10 PROCEDURE — 10020000 ZZH LODGING PLUS FACILITY CHARGE ADULT

## 2019-12-10 NOTE — PROGRESS NOTES
Writer spoke with Corie from Infection Prevention regarding Dr. Melo findings.  As reported red raised nodules, 5-6cm in diameter, left breast .  Treating for shingles.  Valacyclovir 1 g TID for 7 days.  Pt will begin treatment this evening    No other rash locations at this time.  No drainage noted.    Writer informed pt to use appropriate hand washing hygiene.  To not touch site.  Keep site covered and if any additional rash sites or area begins or initial rash begins to drain/weep to notify LP RN and also infection prevention    Infection prevention stated that pt can continue to stay in room that is shared by another pt. Follow standard precautions as well as good hand hygiene.

## 2019-12-10 NOTE — PROGRESS NOTES
Patient met with program  to review and initiate aftercare placement opportunities. He is currently homeless, unemployed and lacks financial resources.  Patient presents as a high risk for relapse with limited independent sober living skills.Patient acknowledges a need to enter a program offering outpatient services   with an added housing/residential component. Various programs were discussed. His funding options may restrict some programs availability. Required documentation has been forwarded to [facility] and [facility] for placement consideration.

## 2019-12-11 ENCOUNTER — HOSPITAL ENCOUNTER (OUTPATIENT)
Dept: BEHAVIORAL HEALTH | Facility: CLINIC | Age: 29
End: 2019-12-11
Attending: FAMILY MEDICINE
Payer: MEDICAID

## 2019-12-11 DIAGNOSIS — F17.203 NICOTINE WITHDRAWAL: Primary | ICD-10-CM

## 2019-12-11 PROCEDURE — 10020000 ZZH LODGING PLUS FACILITY CHARGE ADULT

## 2019-12-11 PROCEDURE — H2035 A/D TX PROGRAM, PER HOUR: HCPCS | Mod: HQ

## 2019-12-11 RX ORDER — NICOTINE 21 MG/24HR
1 PATCH, TRANSDERMAL 24 HOURS TRANSDERMAL EVERY 24 HOURS
Qty: 28 PATCH | Refills: 1 | Status: SHIPPED | OUTPATIENT
Start: 2019-12-11 | End: 2020-02-03 | Stop reason: DRUGHIGH

## 2019-12-11 NOTE — PROGRESS NOTES
Name: Bebeto Alvarado  Date: 12/10/2019  Medical Record: [id number]    Envelope Number: [id number]    List of Contents (List each item separately in new row):   One LG Cell Phone (Cracked Screen) & One Harsha Cell Phone    Admission:  I am responsible for any personal items that are not sent to the safe or pharmacy.  [facility] is not responsible for loss, theft or damage of any property in my possession.      Patient Signature:  ___________________________________________       Date/Time:__________________________    Staff Signature: __________________________________       Date/Time:__________________________    2nd Staff person, if patient is unable/unwilling to sign:      __________________________________________________________       Date/Time: __________________________      Discharge:  [facility] has returned all of my personal belongings:    Patient Signature: ________________________________________     Date/Time: ____________________________________    Staff Signature: ______________________________________     Date/Time:_____________________________________

## 2019-12-12 ENCOUNTER — HOSPITAL ENCOUNTER (OUTPATIENT)
Dept: BEHAVIORAL HEALTH | Facility: CLINIC | Age: 29
End: 2019-12-12
Attending: FAMILY MEDICINE
Payer: MEDICAID

## 2019-12-12 PROCEDURE — H2035 A/D TX PROGRAM, PER HOUR: HCPCS | Mod: HQ

## 2019-12-12 PROCEDURE — 10020000 ZZH LODGING PLUS FACILITY CHARGE ADULT

## 2019-12-12 PROCEDURE — H2035 A/D TX PROGRAM, PER HOUR: HCPCS

## 2019-12-12 NOTE — PROGRESS NOTES
Patient has a scheduled phone interview with Stefani Darnell on Monday 12/23@ 9:00am. He has been informed.

## 2019-12-12 NOTE — PROGRESS NOTES
Met with client on 12/12/19 from 1:15-1:30pm. Client was 15 minutes late to session and stated he was sleeping because of pain from shingles. Therapist directed client to talk with the nurse if he was seeking permission to miss group this afternoon. Client reported that he is meeting with the , Darren, who is helping him with aftercare options and housing. Therapist and client agreed to meet next week.     SARA Pace

## 2019-12-13 ENCOUNTER — HOSPITAL ENCOUNTER (OUTPATIENT)
Dept: BEHAVIORAL HEALTH | Facility: CLINIC | Age: 29
End: 2019-12-13
Attending: FAMILY MEDICINE
Payer: MEDICAID

## 2019-12-13 PROCEDURE — H2035 A/D TX PROGRAM, PER HOUR: HCPCS | Mod: HQ

## 2019-12-13 PROCEDURE — 10020000 ZZH LODGING PLUS FACILITY CHARGE ADULT

## 2019-12-14 ENCOUNTER — HOSPITAL ENCOUNTER (OUTPATIENT)
Dept: BEHAVIORAL HEALTH | Facility: CLINIC | Age: 29
End: 2019-12-14
Attending: FAMILY MEDICINE
Payer: MEDICAID

## 2019-12-14 PROCEDURE — 10020000 ZZH LODGING PLUS FACILITY CHARGE ADULT

## 2019-12-14 PROCEDURE — H2035 A/D TX PROGRAM, PER HOUR: HCPCS | Mod: HQ

## 2019-12-14 NOTE — PROGRESS NOTES
Patient:  Bebeto Alvarado            Adult CD Progress Note and Treatment Plan Review     Attendance  Please refer to OP BEH CD Adult Attendance Record Documentation Flowsheet    Support group attended this week: yes    Reporting sobriety:  yes    Treatment Plan     Treatment Plan Review competed on: 12/14/19       Client preferred learning style: Visual  Hands on  Verbal    Staff Members contributing Misael Sawant [facility], Ayaz Childs [facility], Aura Mccoy [facility], Abe Klein [facility] Intern                         Received Supervision: no    Client: contributed to goals and plan.    Client received copy of plan/revised plan: Yes    Client agrees with plan/revised plan: Yes      Changes to Treatment Plan: No    New Goals added since last review: None    Goals worked on since last review: Motivation, Change, Relapse Prevention, Relationships with DOC, Self Esteem, Mental Health, Moment of Truth, Blind-Sided By DOC, Voices of Addiction, Drug Use History,Taking Responsibility    Strategies effective: yes    Strategies need these changes: None    1) Care Coordination Activities:  Patient will continue to work with counselors and  to address any such issues.  2) Medical, Mental Health and other appointments the client attended: None this week.  3) Medication issues: None  4) Physical and mental health problems: None interfering with treatment  5) Any changes in Vulnerable Adult Status?  No If yes, add to treatment plan and individual abuse prevention plan.  6) Review and evaluation of the individual abuse prevention plan: Current IAPP for this program is adequate for this client      ASAM Risk Rating:    Dimension 1 1 Patient reports to withdrawal symptomology at this time.      Dimension 2 0 Patient reports no biomedical issues this week.      Dimension 3 2 Patient continues to gain awareness regarding how his addiction affects mental and emotional well-being. Patient reports no suicidal ideation at  "this time.      Dimension 4 2 Patient is continuing to work on his internal motivation fore change and attends meetings and lectures on time. Patient completed  his \"What Price Am I Willing to Pay?\" assignment and presented in group. Patient is working on his \"Step Two\" assignment and will present in group when complete.      Dimension 5 4 Patient is continuing to work on his triggers, cues, and early signs for relapse. Patient completed his \"Taking Responsibility\" assignment and presented in group. Patient is also working on his \"An Attitude Inventory\" assignment and will present in group when complete.      Dimension 6 4 Patient reports attending 5 12 Step meetings this week in compliance with his treatment plan objectives. Patient will continue to work on obtaining a sponsor while in treatment. Patient will continue to work with counselors and  to develop an aftercare treatment program.      Guide to Risk Ratings for Suicidality:   IDEATION: Active thoughts of suicide? INTENT: Intent to follow on suicide? PLAN: Plan to follow through on suicide? Level of Risk:   IF Yes Yes Yes Patient = High Emergent   IF Yes Yes No Patient = High Urgent/Non-Emergent   IF Yes No No Patient = Moderate Non-Urgent   IF No No   No Patient = Low Risk   The patient's ADDITIONAL RISK FACTORS and lack of PROTECTIVE FACTORS may increase their overall suicide risk ratings.     Patient's/client's current risk rating:  Low Risk    Family Involvement:   none schedule this week    Data:   offered feedback client did participate      Intervention:   Aftercare planning  Behavior modification  Counselor feedback  Education  Emotional management  Group feedback  Motivational Enhancement Therapy  Relapse prevention  Twelve Step facilitation  Mental health education      Assessment:   Stages of Change Model  Contemplation    Appears/Sounds:  Cooperative  Engaged  Currently lacking consistent behavior      Plan:  Next treatment task " referral out  Focus on recovery environment  Monitor emotional/physical health  Behavioral expectations      YOVANA Sullivan

## 2019-12-15 ENCOUNTER — HOSPITAL ENCOUNTER (OUTPATIENT)
Dept: BEHAVIORAL HEALTH | Facility: CLINIC | Age: 29
End: 2019-12-15
Attending: FAMILY MEDICINE
Payer: MEDICAID

## 2019-12-15 PROCEDURE — 10020000 ZZH LODGING PLUS FACILITY CHARGE ADULT

## 2019-12-15 PROCEDURE — H2035 A/D TX PROGRAM, PER HOUR: HCPCS | Mod: HQ

## 2019-12-16 ENCOUNTER — HOSPITAL ENCOUNTER (OUTPATIENT)
Dept: BEHAVIORAL HEALTH | Facility: CLINIC | Age: 29
End: 2019-12-16
Attending: FAMILY MEDICINE
Payer: MEDICAID

## 2019-12-16 PROCEDURE — H2035 A/D TX PROGRAM, PER HOUR: HCPCS

## 2019-12-16 PROCEDURE — H2035 A/D TX PROGRAM, PER HOUR: HCPCS | Mod: HQ

## 2019-12-16 PROCEDURE — 10020000 ZZH LODGING PLUS FACILITY CHARGE ADULT

## 2019-12-17 ENCOUNTER — HOSPITAL ENCOUNTER (OUTPATIENT)
Dept: BEHAVIORAL HEALTH | Facility: CLINIC | Age: 29
End: 2019-12-17
Attending: FAMILY MEDICINE
Payer: MEDICAID

## 2019-12-17 DIAGNOSIS — F17.200 NICOTINE DEPENDENCE: Primary | ICD-10-CM

## 2019-12-17 PROCEDURE — H2035 A/D TX PROGRAM, PER HOUR: HCPCS | Mod: HQ

## 2019-12-17 PROCEDURE — 10020000 ZZH LODGING PLUS FACILITY CHARGE ADULT

## 2019-12-17 PROCEDURE — H2035 A/D TX PROGRAM, PER HOUR: HCPCS

## 2019-12-18 ENCOUNTER — HOSPITAL ENCOUNTER (OUTPATIENT)
Dept: BEHAVIORAL HEALTH | Facility: CLINIC | Age: 29
End: 2019-12-18
Attending: FAMILY MEDICINE
Payer: MEDICAID

## 2019-12-18 ENCOUNTER — OFFICE VISIT (OUTPATIENT)
Dept: ADDICTION MEDICINE | Facility: CLINIC | Age: 29
End: 2019-12-18
Payer: MEDICAID

## 2019-12-18 VITALS
TEMPERATURE: 97.7 F | OXYGEN SATURATION: 97 % | DIASTOLIC BLOOD PRESSURE: 62 MMHG | WEIGHT: 158.5 LBS | BODY MASS INDEX: 24.82 KG/M2 | SYSTOLIC BLOOD PRESSURE: 118 MMHG | RESPIRATION RATE: 16 BRPM | HEART RATE: 91 BPM

## 2019-12-18 DIAGNOSIS — F17.200 TOBACCO USE DISORDER: ICD-10-CM

## 2019-12-18 DIAGNOSIS — G47.00 INSOMNIA, UNSPECIFIED TYPE: ICD-10-CM

## 2019-12-18 DIAGNOSIS — F11.20 OPIOID USE DISORDER, MODERATE, DEPENDENCE (H): Primary | ICD-10-CM

## 2019-12-18 DIAGNOSIS — F90.0 ATTENTION DEFICIT HYPERACTIVITY DISORDER (ADHD), PREDOMINANTLY INATTENTIVE TYPE: ICD-10-CM

## 2019-12-18 DIAGNOSIS — Z79.899 HIGH RISK MEDICATION USE: ICD-10-CM

## 2019-12-18 PROCEDURE — H2035 A/D TX PROGRAM, PER HOUR: HCPCS | Mod: HQ

## 2019-12-18 PROCEDURE — H2035 A/D TX PROGRAM, PER HOUR: HCPCS

## 2019-12-18 PROCEDURE — 99214 OFFICE O/P EST MOD 30 MIN: CPT | Performed by: NURSE PRACTITIONER

## 2019-12-18 PROCEDURE — 10020000 ZZH LODGING PLUS FACILITY CHARGE ADULT

## 2019-12-18 PROCEDURE — 80306 DRUG TEST PRSMV INSTRMNT: CPT | Performed by: NURSE PRACTITIONER

## 2019-12-18 RX ORDER — BUPRENORPHINE AND NALOXONE 4; 1 MG/1; MG/1
1 FILM, SOLUBLE BUCCAL; SUBLINGUAL 2 TIMES DAILY
Qty: 14 FILM | Refills: 0 | Status: SHIPPED | OUTPATIENT
Start: 2019-12-18 | End: 2019-12-26

## 2019-12-18 RX ORDER — TRAZODONE HYDROCHLORIDE 50 MG/1
50-100 TABLET, FILM COATED ORAL
Qty: 30 TABLET | Refills: 1 | Status: SHIPPED | OUTPATIENT
Start: 2019-12-18 | End: 2020-01-09

## 2019-12-18 NOTE — PROGRESS NOTES
SUBJECTIVE                                                    CC: Patient presents with:  Drug Problem    BUPRENORPHINE FOLLOW UP: current dose Suboxone 4-1mg BID.     Tavo Alvarado is a 29 year old male who presents to clinic today for follow up of Buprenorphine.      Date of last visit:  12/04/2019 - Visit with Dr. Melo  No-shows/Late cancels: None    Primary Care Provider: Physician No Ref-Primary   [place] Board of Pharmacy Data Base Reviewed:    Yes; reviewed today and no concerns for diversionary activity.  Most recent data includes:  12/04/2019 Suboxone 4 Mg-1 Mg Sl Film  #30 (15 days) Dr Melo    Brief History:    Initial visit with me on 11/20/2019  Recreational use of mushrooms and marijuana as a teen.  At 18 started using oxycontin orally at parties then started smoking heroin at age 20.  Around age [age] started using heroin daily to avoid withdrawal symptoms.  Detox and treatment at [facility] in 10/2018 for one week but asked to leave as he was accused of buying drugs on the campus.  Started using heroin IV in 06/2019 1/2 to 1 gram daily.  Detox admission 11/15/2019 -11/19/2019 and went to [facility] once a bed was available.      Tobacco use - reducing and wants to quit with support from nicotine gum.     Hepatitis C Status - Nonreactive 11/14/2019              HIV status - Nonreactive 11/14/2019     Date of last use:   Heroin 11/13/2019   THC - 11/2019 prior to [facility]     HPI:    12/18/2019  Localized rash on left upper chest that started on 12/6/2019 and noticed the day after starting wellbutrin, wellbutrin has been held since then as a precaution and patient treated for suspected shingles with some improvement in localized pain, itching, and redness.  Patient previously tolerated wellbutrin in the past without adverse reaction.  Rash has improved, still present on upper Left chest.  He completed the valtrex treatment and used the capsaicin cream once but it burned after applied  "so he hasn't used it since.  Last time had shingles in adolescence on back. He reports interest in resuming his wellbutrin for attention and focus.  Mood has been \"great\" and low mood fluctuating throughout the day with stressors but low mood goes away with stress reduction and distraction.  Hx ADHD and having trouble focusing in groups, easily distracted and task completion with treatment homework assignments, no attention to detail, and trouble managing time which has led to being late to a few groups.   He is sleeping well with the trazodone, melatonin and sleepy time tea, sometimes takes Trazodone 100 mg and has AM sedation but he has since started using 50 mg instead to prevent oversedation in the AM.  Anxiety is stable and fluctuates with stress throughout the day but is manageable and doesn't interfere with ability to attend programming or interact with others.  He has volunteered to be the  at the next NA meeting and is looking for a sponsor to work with after [facility].  He is hopeful about his recovery and states his longest sobriety was 2 months in the past.     Quitting tobacco using 4 cigarettes daily and using gum and patches.  Vivid dreams with the nicotine patch that he reportedly enjoys.  Two films of suboxone left at this time.  He is going to [facility] ([place], [place]) after completing LP on 12/27/2019. He has a car to get to future appointments.   If still doing well at his next visit discussed possibility of moving to three week follow ups after next visit.  Excited about his family coming to  for the Holiday to visit.       Status since last visit:      Since last visit patient has been: stable    Intensity:     There has been: no craving and feels great.     Suboxone Dose: adequate  Progression of Symptoms:     Cues to use and relapse triggers: none    Recovery program has been: active as he is still in [facility]   Accompanying Signs & Symptoms:    Side Effects: none.  " "  Sobriety:     Status: no use since last visit.      Drug Screen: obtained.   Precipitating factors:    Triggers have been: non-existent.   Alleviating factors:    Contact with sponsor has been: no sponsor but is actively looking     Family and support system has been: helpful.   Other Therapies Tried :     Patient has been going to recovery meetings: regularly while in [facility]    Patient has been participating in professional counseling/therapy: Seeing SARA Mckeon while in [facility] for individual therapy sessions.     TOBACCO USE:   Current use - 4 cigarettes daily, working on reducing    Interested in quitting and is using nicotine gum to reduce use.      Social History     Social History Narrative    Current living situation: currently living with his mom [place] the [place] of [place] while waiting to get into treatment; prior to that was living with a friend but after getting into an argument with his roommate he ended up staying on other people's couches for 2 weeks before going into detox    Marital status: single, never     Children: none    Employment/financial status: Unemployed currently; dropped out of high school senior year - was smoking THC; no GED but wants to get in future; previous jobs include  for [employer] - lost job for using as he was \"slacking\" at work; he reports 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     Problem list and histories reviewed & adjusted, as indicated.  Additional " history: as documented    Patient Active Problem List    Diagnosis Date Noted     Chemical dependency (H) 12/01/2019     Priority: Medium     Substance abuse withdrawal with complication (H) 11/14/2019     Priority: Medium     Attention deficit disorder 12/15/2004     Priority: Medium     Epic       Cannabis abuse 12/15/2004     Priority: Medium     Epic         Current Medications:  acetaminophen (TYLENOL) 325 MG tablet, Take 325-650 mg by mouth every 4 hours as needed for mild pain  alum & mag hydroxide-simethicone (MYLANTA/MAALOX) 200-200-20 MG/5ML SUSP suspension, Take 30 mLs by mouth every 6 hours as needed for indigestion  buprenorphine HCl-naloxone HCl (SUBOXONE) 4-1 MG per film, Place 1 Film under the tongue 2 times daily  buPROPion (WELLBUTRIN XL) 150 MG 24 hr tablet, Take 1 tablet (150 mg) by mouth every morning  capsaicin (ZOSTRIX) 0.025 % external cream, Apply to rash three times daily as needed for pain  guaiFENesin (ROBITUSSIN) 20 mg/mL SOLN solution, Take 10 mLs by mouth every 4 hours as needed for cough  ibuprofen (ADVIL/MOTRIN) 200 MG tablet, Take 400 mg by mouth every 6 hours as needed for mild pain  loratadine (CLARITIN) 10 MG tablet, Take 10 mg by mouth daily as needed for allergies  melatonin 3 MG tablet, Take 2 tablets (6 mg) by mouth At Bedtime  nicotine (NICODERM CQ) 14 MG/24HR 24 hr patch, Place 1 patch onto the skin every 24 hours  nicotine (NICORETTE) 2 MG gum, Place 1-2 each (2-4 mg) inside cheek as needed for smoking cessation  nicotine (NICORETTE) 2 MG gum, Place 1-2 each (2-4 mg) inside cheek as needed for smoking cessation  phenol-menthol (CEPASTAT) 14.5 MG lozenge, Place 1 lozenge inside cheek every 2 hours as needed for moderate pain  senna-docusate (SENOKOT-S/PERICOLACE) 8.6-50 MG tablet, Take 2 tablets by mouth daily as needed for constipation  traZODone (DESYREL) 50 MG tablet, Take 1-2 tablets ( mg) by mouth nightly as needed for sleep  valACYclovir (VALTREX) 1000 mg  tablet, Take 1 tablet (1,000 mg) by mouth 3 times daily for 7 days    Self Administer Medications: Behavioral Services        Allergies   Allergen Reactions     Codeine Unknown     Pt has been told.       REVIEW OF SYSTEMS:  General:  No acute withdrawal symptoms.  No recent infections or fever  Eyes:  No vision concerns.  No double vision.    Resp: No coughing, wheezing or shortness of breath  CV: No chest pains or palpitations  GI: No nausea, vomiting, abdominal pain, diarrhea.  No constipation  : No urinary frequency or dysuria    Musculoskeletal: No significant muscle or joint pains other than as above.  No edema  Neurologic: No numbness, tingling, weakness, problems with balance or coordination  Psychiatric: No acute concerns other than as above.   Skin: No rashes or areas of acute infection      OBJECTIVE                                                    LABS:  Labs reviewed.  Urine tox results positive for buprenorphine today.     Results for orders placed or performed in visit on 12/18/19   Urine Drugs of Abuse Screen Panel 13     Status: Abnormal   Result Value Ref Range    Cannabinoids (83-vvh-0-carboxy-9-THC) Not Detected NDET^Not Detected ng/mL    Phencyclidine (Phencyclidine) Not Detected NDET^Not Detected ng/mL    Cocaine (Benzoylecgonine) Not Detected NDET^Not Detected ng/mL    Methamphetamine (d-Methamphetamine) Not Detected NDET^Not Detected ng/mL    Opiates (Morphine) Not Detected NDET^Not Detected ng/mL    Amphetamine (d-Amphetamine) Not Detected NDET^Not Detected ng/mL    Benzodiazepines (Nordiazepam) Not Detected NDET^Not Detected ng/mL    Tricyclic Antidepressants (Desipramine) Not Detected NDET^Not Detected ng/mL    Methadone (Methadone) Not Detected NDET^Not Detected ng/mL    Barbiturates (Butalbital) Not Detected NDET^Not Detected ng/mL    Oxycodone (Oxycodone) Not Detected NDET^Not Detected ng/mL    Propoxyphene (Norpropoxyphene) Not Detected NDET^Not Detected ng/mL    Buprenorphine  "(Buprenorphine) Detected, Abnormal Result (A) NDET^Not Detected ng/mL     PHYSICAL EXAM:  /62   Pulse 91   Temp 97.7  F (36.5  C) (Oral)   Resp 16   Wt 71.9 kg (158 lb 8 oz)   SpO2 97%   BMI 24.82 kg/m      GENERAL APPEARANCE:  alert, comfortable appearing  EYES:Eyes grossly normal to inspection  NEURO:  Gait normal.  No tremor. Coordination intact.     MENTAL STATUS EXAM:  Appearance:  awake, alert and adequately groomed  Attitude:  cooperative  Eye Contact:  good  Mood:  \"great\"  Affect:  appropriate and in normal range, mood congruent and full range  Speech:  clear, coherent and normal prosody rate /rhythm are normal  Psychomotor Behavior:  no evidence of tardive dyskinesia, dystonia, or tics  Throught Process:  logical, linear and goal oriented  Associations:  no loose associations  Thought Content:  no evidence of suicidal ideation or homicidal ideation, no evidence of psychotic thought, no auditory hallucinations present and no visual hallucinations present  Insight:  fair  Judgement:  fair  Oriented to:  time, person, and place  Attention Span and Concentration:  fair  Recent and Remote Memory:  intact  Language fund of knowledge are adequate      ASSESSMENT/PLAN                                                      (F11.20) Opioid use disorder, moderate, dependence (H)  (primary encounter diagnosis)  Comment: No use since last visit - reports longest sobriety 2 months and he doesn't remember last time he felt this great.   Plan: buprenorphine HCl-naloxone HCl (SUBOXONE) 4-1 MG per film; take 1 film BID.     (F90.0) Attention deficit hyperactivity disorder (ADHD), predominantly inattentive type  Comment: Per patient by history.  Has been on stimulants as an adolescent but reported side effects.  Would like to trial non-stimulant option (wellbutrin) for attention and focus.  Has tried in wellbutrin in the past without side effects.   Plan: Restart wellbutrin 150 mg XL once daily and stop if rash " worsens.  Will evaluate need for dose change at next visit if tolerated.     (G47.00) Insomnia, unspecified type  Comment: Taking trazodone, melatonin and sleepy time tea with good effect.  Some nights when takes Trazodone 100 mg he is sedated in the AM.  Has been taking 50 mg instead most nights.   Plan: traZODone (DESYREL) 50 MG tablet, take 1-2 tablets by mouth PRN for sleep.     (F17.200) Tobacco use disorder  Comment: Still smoking about 4 cigarettes a day and using the nicotine replacement.  Working on continued reduction in use which has been challenging while in treatment as this is a social activity.   Plan: Continue to work on reducing cigarette use with assistance of nicotine patch and gum.     (Z79.899) High risk medication use  Comment: Suboxone   Plan: Routine UDS, monitoring cravings and return to use.       ENCOUNTER FOR LONG TERM USE OF HIGH RISK MEDICATION   High Risk Drug Monitoring?  YES   Drug being monitored: Buprenorphine   Reason for drug: Opioid use disorder   What is being monitored?: Dosage, Cravings, Trigger, side effects, and continued abstinence.      Follow-up: 1 week - LP RN and [facility] staff notified that patient can resume Wellbutrin as previously ordered and patient okay to take a dose today before 1PM to prevent sleep disruption.     Patient counseling completed today:  Counseled the patient on the importance of having a recovery program in addition to Suboxone maintenance.  Also discussed having a sober support network, not isolating, being open, honest, and willing to change, avoiding triggers and managing cravings. In addition, he should abstain from alcohol, benzodiazepines, THC, opioids and other drugs of abuse.  Risk of overdose following a period of abstinence due to decrease tolerance was reviewed including risk of death.        Jasmin Coppola CNP  [facility] - Addiction Medicine  [facility] - Integrated Primary Care  [facility]  [phone]

## 2019-12-19 ENCOUNTER — HOSPITAL ENCOUNTER (OUTPATIENT)
Dept: BEHAVIORAL HEALTH | Facility: CLINIC | Age: 29
End: 2019-12-19
Attending: FAMILY MEDICINE
Payer: MEDICAID

## 2019-12-19 PROCEDURE — H2035 A/D TX PROGRAM, PER HOUR: HCPCS

## 2019-12-19 PROCEDURE — 10020000 ZZH LODGING PLUS FACILITY CHARGE ADULT

## 2019-12-19 PROCEDURE — H2035 A/D TX PROGRAM, PER HOUR: HCPCS | Mod: HQ

## 2019-12-19 NOTE — PROGRESS NOTES
Integrative Therapies: Essential Oils    Patient requesting Citrus essential oil inhaler to manage mood. Discussed appropriate use of essential oil inhalers and instructed patient not to leave labeled product out on unit.     Patient verbalized and demonstrated understanding of how to use essential oil inhaler correctly and will notify LP RN with any concerns or side effects. Patient agrees not to share their essential oil inhaler with other clients.    Continue to support the patient in safely utilizing integrative therapies as able to manage symptoms during treatment.

## 2019-12-19 NOTE — PROGRESS NOTES
INDIVIDUAL SESSION SUMMARY    D) Met with client on 12/19/19 from 2:30-3:00. Client reported he has a phone interview with Stefani Darnell on Monday 12/23 and hopes to move into their [place] location close to his sober aunt. Client shared feelings of concern about his mom's substance abuse issue and asked for support on how to approach his mom. Client expressed interest in continuing with individual therapy.      I) Individual session with client. Provided client with verbal interventions including: validation, nurturing, compassion and support. Therapist role played conversation client could have with him mom; spoke about the importance of sharing his feelings but not trying to control.     A) Client appears to have internal motivation at this time and would benefit from continuing support to help with processing feelings of shame, concerns for his mother, stress management, emotional regulation, impulse control, increasing resiliency and self-esteem.     P) No future sessions scheduled. This therapist will provide the client with several therapist referrals to contact in the community.   SARA Pace  12/19/2019

## 2019-12-19 NOTE — PROGRESS NOTES
Name: Bebeto Alvarado  Date: 12/19/2019  Medical Record: [id number]    Envelope Number: [id number]    List of Contents (List each item separately in new row):   one LG cellphone (cracked screen)  one Harsha cellphone              (Repackaged from envelope # [id number])    Admission:  I am responsible for any personal items that are not sent to the safe or pharmacy.  [facility] is not responsible for loss, theft or damage of any property in my possession.      Patient Signature:  ___________________________________________       Date/Time:__________________________    Staff Signature: __________________________________       Date/Time:__________________________    2nd Staff person, if patient is unable/unwilling to sign:      __________________________________________________________       Date/Time: __________________________      Discharge:  [facility] has returned all of my personal belongings:    Patient Signature: ________________________________________     Date/Time: ____________________________________    Staff Signature: ______________________________________     Date/Time:_____________________________________

## 2019-12-20 ENCOUNTER — HOSPITAL ENCOUNTER (OUTPATIENT)
Dept: BEHAVIORAL HEALTH | Facility: CLINIC | Age: 29
End: 2019-12-20
Attending: FAMILY MEDICINE
Payer: MEDICAID

## 2019-12-20 PROCEDURE — 10020000 ZZH LODGING PLUS FACILITY CHARGE ADULT

## 2019-12-20 PROCEDURE — H2035 A/D TX PROGRAM, PER HOUR: HCPCS | Mod: HQ

## 2019-12-21 ENCOUNTER — HOSPITAL ENCOUNTER (OUTPATIENT)
Dept: BEHAVIORAL HEALTH | Facility: CLINIC | Age: 29
End: 2019-12-21
Attending: FAMILY MEDICINE
Payer: MEDICAID

## 2019-12-21 PROCEDURE — 10020000 ZZH LODGING PLUS FACILITY CHARGE ADULT

## 2019-12-21 PROCEDURE — H2035 A/D TX PROGRAM, PER HOUR: HCPCS

## 2019-12-21 NOTE — IP AVS SNAPSHOT
MRN:[id number]                      After Visit Summary   12/21/2019    Bebeto Alvarado    MRN: [id number]           Visit Information        Provider Department      12/21/2019  7:15 AM [facility]        Your next 10 appointments already scheduled    Dec 22, 2019  7:15 AM CST  Treatment with [facility]  [facility] ([facility]) [address] [place] [id number]  [phone]      Dec 23, 2019  7:15 AM CST  Treatment with [facility]  [facility] ([facility]) [address] [place] [id number]  [phone]      Dec 24, 2019  7:15 AM CST  Treatment with [facility]  [facility] ([facility]) [address] [place] [id number]  [phone]      Dec 25, 2019  7:15 AM CST  Treatment with [facility]  [facility] ([facility]) [address] [place] [id number]  [phone]      Dec 26, 2019  7:15 AM CST  Treatment with [facility]  [facility] ([facility]) [address] [place] [id number]  [phone]      Dec 26, 2019 10:00 AM CST  Return Visit with Jasmin Coppola CNP  [facility] ([facility]) [address]  [address]  [facility] [address]  [phone]         Care EveryWhere ID    This is your Care EveryWhere ID. This could be used by other organizations to access your [facility] medical records  [id number]       Equal Access to Services    VIOLET COOK AH: Moustapha Wyatt, mayra manzo, negra perez,  amy morel'aan ah. So [facility] [phone].    ATENCIÓN: Si habla español, tiene a aly disposición servicios gratuitos de asistencia lingüística. Llame al [phone].    We comply with applicable federal and state civil rights laws, including the [place] Human Rights Act. We do not discriminate on the basis of race, color, creed, Yazidi, national origin, marital status, age, disability, sex, sexual orientation, or gender identity.

## 2019-12-21 NOTE — IP AVS SNAPSHOT
Medication List       Patient:  BRANDY ROSEN   :  1990   Physician:  No Ref-Primary, Physician           This is your record.  Keep this with you and show to your community pharmacist(s) and physician(s) at each visit.     Allergies:  CODEINE - Unknown               Medications  Valid as of: 2019 -  9:06 AM    Generic Name Brand Name Tablet Size Instructions for use    Buprenorphine HCl-Naloxone HCl SUBOXONE 4-1 MG Place 1 Film under the tongue 2 times daily        buPROPion HCl WELLBUTRIN  MG Take 1 tablet (150 mg) by mouth every morning        Capsaicin ZOSTRIX 0.025 % Apply to rash three times daily as needed for pain        Melatonin melatonin 3 MG Take 2 tablets (6 mg) by mouth At Bedtime        Nicotine NICODERM CQ 14 MG/24HR Place 1 patch onto the skin every 24 hours        Nicotine Polacrilex NICORETTE 2 MG Place 1-2 each (2-4 mg) inside cheek as needed for smoking cessation        Nicotine Polacrilex NICORETTE 2 MG Place 1-2 each (2-4 mg) inside cheek as needed for smoking cessation        traZODone HCl DESYREL 50 MG Take 1-2 tablets ( mg) by mouth nightly as needed for sleep        .           .           .           .

## 2019-12-21 NOTE — PROGRESS NOTES
Called patient from the waiting list; Left message on voicemail to return my call if they still would like an appointment Nursing Discharge Planning Meeting    Writer completed discharge planning meeting with patient. Discharge is planned for 12/27/19    Discussed appropriate follow up care to manage [place], [place], medical and to obtain medication refills. Patient given a copy of their current medications for reference. Questions were answered at this time and the patient verbalized an understanding of the post-discharge follow up plan.    Patient to scheduled an appointment with Jasmin Coppola CNP for addiction medicine management 12/16 and PCP to est care Kianna ochoa 12/31  Continue to support patient in discharge planning as needed to assure appropriate continuity of care.     Tobacco Cessation  Patient participated in the nicotine replacement therapy for tobacco cessation or reduction during their treatment programming: Yes, using cessation products has cut back some.    The patient was provided with community resources for follow-up to continue tobacco cessation support once in the community. Also the patient was encoruaged to discuss their tobacco cessation efforts with the primary care provider.

## 2019-12-22 ENCOUNTER — HOSPITAL ENCOUNTER (OUTPATIENT)
Dept: BEHAVIORAL HEALTH | Facility: CLINIC | Age: 29
End: 2019-12-22
Attending: FAMILY MEDICINE
Payer: MEDICAID

## 2019-12-22 PROCEDURE — 10020000 ZZH LODGING PLUS FACILITY CHARGE ADULT

## 2019-12-22 PROCEDURE — H2035 A/D TX PROGRAM, PER HOUR: HCPCS

## 2019-12-22 NOTE — PROGRESS NOTES
Patient:  Bebeto Alvarado            Adult CD Progress Note and Treatment Plan Review     Attendance  Please refer to OP BEH CD Adult Attendance Record Documentation Flowsheet    Support group attended this week: yes    Reporting sobriety:  yes    Treatment Plan     Treatment Plan Review competed on: 12-22-19       Client preferred learning style: Visual  Hands on  Verbal    Staff Members contributing [facility],Ayaz [facility], [facility].                     Received Supervision: No    Client: contributed to goals and plan.    Client received copy of plan/revised plan: Yes    Client agrees with plan/revised plan: Yes        Changes to Treatment Plan: No    New Goals added since last review None needed.    Goals worked on since last review See ASAM notes below.    Strategies effective: yes    Strategies need these changes: None needed.    1) Care Coordination Activities:  Pt.continues to work with staff .  2) Medical, Mental Health and other appointments the client attended: None.  3) Medication issues: None.  4) Physical and mental health problems: Pt.reports he has none.  5) Any changes in Vulnerable Adult Status?  No If yes, add to treatment plan and individual abuse prevention plan.  6) Review and evaluation of the individual abuse prevention plan: Current IAPP for this program is adequate for this client          ASAM Risk Rating:    Dimension 1 1 No change.    Dimension 2 0 No change.    Dimension 3 2 Pt.reports he has had no suicidal ideation.Pt.has been able to see the staff psychotherapist.     Dimension 4 2 Pt.has been attending all groups and lectures. Pt.has been working on acceptance of the first step. He seem to have gained insight into how powerless he his over drugs.Pt.completed his first step and drug use history assignments.     Dimension 5 4 Pt.attended the relapse prevention workshop. He was asked to identify his relapse warning signs and triggers in the  areas of people,places,activites and feelings.Pt.continues to work on his relapse  prevention plan.  Pt.continues to identify and process his resentments.    Dimension 6 Pt.continues to work with staff  on aftercare plan. Pt.has been attending 5 12 step meetings per week while in treatment. He needs to obtain a sponsor.      Guide to Risk Ratings for Suicidality:   IDEATION: Active thoughts of suicide? INTENT: Intent to follow on suicide? PLAN: Plan to follow through on suicide? Level of Risk:   IF Yes Yes Yes Patient = High Emergent   IF Yes Yes No Patient = High Urgent/Non-Emergent   IF Yes No No Patient = Moderate Non-Urgent   IF No No   No Patient = Low Risk   The patient's ADDITIONAL RISK FACTORS and lack of PROTECTIVE FACTORS may increase their overall suicide risk ratings.     Patient's/client's current risk rating:  Low Risk    Family Involvement:   ANAIS signed    Data:   offered feedback good insight client did actively participate      Intervention:   Aftercare planning  Behavior modification  Counselor feedback  Education  Emotional management  Group feedback  Relapse prevention  Twelve Step facilitation  Mental health education      Assessment:   Stages of Change Model  Action    Appears/Sounds:  Cooperative  Motivated  Engaged      Plan:  Continue group therapy.      YOVANA Santillan

## 2019-12-23 ENCOUNTER — HOSPITAL ENCOUNTER (OUTPATIENT)
Dept: BEHAVIORAL HEALTH | Facility: CLINIC | Age: 29
End: 2019-12-23
Attending: FAMILY MEDICINE
Payer: MEDICAID

## 2019-12-23 PROCEDURE — H2035 A/D TX PROGRAM, PER HOUR: HCPCS | Mod: HQ

## 2019-12-23 PROCEDURE — H2035 A/D TX PROGRAM, PER HOUR: HCPCS

## 2019-12-23 PROCEDURE — 10020000 ZZH LODGING PLUS FACILITY CHARGE ADULT

## 2019-12-24 ENCOUNTER — HOSPITAL ENCOUNTER (OUTPATIENT)
Dept: BEHAVIORAL HEALTH | Facility: CLINIC | Age: 29
End: 2019-12-24
Attending: FAMILY MEDICINE
Payer: MEDICAID

## 2019-12-24 PROCEDURE — 10020000 ZZH LODGING PLUS FACILITY CHARGE ADULT

## 2019-12-24 PROCEDURE — H2035 A/D TX PROGRAM, PER HOUR: HCPCS | Mod: HQ

## 2019-12-25 ENCOUNTER — HOSPITAL ENCOUNTER (OUTPATIENT)
Dept: BEHAVIORAL HEALTH | Facility: CLINIC | Age: 29
End: 2019-12-25
Attending: FAMILY MEDICINE
Payer: MEDICAID

## 2019-12-25 PROCEDURE — H2035 A/D TX PROGRAM, PER HOUR: HCPCS | Mod: HQ

## 2019-12-25 PROCEDURE — 10020000 ZZH LODGING PLUS FACILITY CHARGE ADULT

## 2019-12-26 ENCOUNTER — HOSPITAL ENCOUNTER (OUTPATIENT)
Dept: BEHAVIORAL HEALTH | Facility: CLINIC | Age: 29
End: 2019-12-26
Attending: FAMILY MEDICINE
Payer: MEDICAID

## 2019-12-26 ENCOUNTER — TELEPHONE (OUTPATIENT)
Dept: ADDICTION MEDICINE | Facility: CLINIC | Age: 29
End: 2019-12-26

## 2019-12-26 ENCOUNTER — OFFICE VISIT (OUTPATIENT)
Dept: ADDICTION MEDICINE | Facility: CLINIC | Age: 29
End: 2019-12-26
Payer: MEDICAID

## 2019-12-26 VITALS
BODY MASS INDEX: 25.45 KG/M2 | OXYGEN SATURATION: 96 % | SYSTOLIC BLOOD PRESSURE: 90 MMHG | TEMPERATURE: 97.3 F | DIASTOLIC BLOOD PRESSURE: 60 MMHG | HEART RATE: 71 BPM | WEIGHT: 162.5 LBS

## 2019-12-26 DIAGNOSIS — F90.0 ATTENTION DEFICIT HYPERACTIVITY DISORDER (ADHD), PREDOMINANTLY INATTENTIVE TYPE: ICD-10-CM

## 2019-12-26 DIAGNOSIS — F11.20 OPIOID USE DISORDER, MODERATE, DEPENDENCE (H): ICD-10-CM

## 2019-12-26 DIAGNOSIS — F11.20 OPIOID USE DISORDER, MODERATE, DEPENDENCE (H): Primary | ICD-10-CM

## 2019-12-26 DIAGNOSIS — G47.00 INSOMNIA, UNSPECIFIED TYPE: ICD-10-CM

## 2019-12-26 PROCEDURE — H2035 A/D TX PROGRAM, PER HOUR: HCPCS

## 2019-12-26 PROCEDURE — 99214 OFFICE O/P EST MOD 30 MIN: CPT | Performed by: NURSE PRACTITIONER

## 2019-12-26 PROCEDURE — H2035 A/D TX PROGRAM, PER HOUR: HCPCS | Mod: HQ

## 2019-12-26 PROCEDURE — 80306 DRUG TEST PRSMV INSTRMNT: CPT | Performed by: NURSE PRACTITIONER

## 2019-12-26 PROCEDURE — 10020000 ZZH LODGING PLUS FACILITY CHARGE ADULT

## 2019-12-26 RX ORDER — BUPRENORPHINE AND NALOXONE 4; 1 MG/1; MG/1
1 FILM, SOLUBLE BUCCAL; SUBLINGUAL 2 TIMES DAILY
Qty: 28 FILM | Refills: 0 | Status: SHIPPED | OUTPATIENT
Start: 2019-12-26 | End: 2019-12-26

## 2019-12-26 RX ORDER — BUPROPION HYDROCHLORIDE 150 MG/1
150 TABLET ORAL EVERY MORNING
Qty: 30 TABLET | Refills: 1 | Status: SHIPPED | OUTPATIENT
Start: 2019-12-26 | End: 2019-12-26

## 2019-12-26 RX ORDER — BUPRENORPHINE AND NALOXONE 4; 1 MG/1; MG/1
1 FILM, SOLUBLE BUCCAL; SUBLINGUAL 2 TIMES DAILY
Qty: 28 FILM | Refills: 0 | Status: SHIPPED | OUTPATIENT
Start: 2019-12-26 | End: 2020-01-09

## 2019-12-26 RX ORDER — BUPROPION HYDROCHLORIDE 150 MG/1
150 TABLET ORAL EVERY MORNING
Qty: 30 TABLET | Refills: 1 | Status: SHIPPED | OUTPATIENT
Start: 2019-12-26 | End: 2020-01-09

## 2019-12-26 NOTE — TELEPHONE ENCOUNTER
Reason for Call:  Call back - Verbal ok    Detailed comments: Nenita from the pharmacy had called stating that a different provider will need to call pharmacy to give either a verbal ok to prescribe medication or escribe medication. According to pharmacy Jasmin is unable to prescribe medication for people with [place] Medicaid which is what the patient has for insurance.    [facility] [place], [place] - [address]

## 2019-12-26 NOTE — PROGRESS NOTES
"  SUBJECTIVE                                                    CC: Patient presents with:  Addiction Problem    BUPRENORPHINE FOLLOW UP: current dose Suboxone 4-1 mg BID.    Bebeto Alvarado is a 29 year old male who presents to clinic today for follow up of Buprenorphine.      Date of last visit:  12/18/2019  No-shows/Late cancels: None    Primary Care Provider: Establishing new PCP at [facility] Board of Pharmacy Data Base Reviewed:    Yes; reviewed today and no concerns for diversionary activity.  Most recent data includes:  12/19/2019   Suboxone 4 Mg-1 Mg Sl Film #14.00 7       Brief History:    Initial visit with me on 11/20/2019  Recreational use of mushrooms and marijuana as a teen.  At 18 started using oxycontin orally at parties then started smoking heroin at age 20.  Around age [age] started using heroin daily to avoid withdrawal symptoms.  Detox and treatment at [facility] in 10/2018 for one week but asked to leave as he was accused of buying drugs on the campus.  Started using heroin IV in 06/2019 1/2 to 1 gram daily.  Detox admission 11/15/2019 -11/19/2019 and went to [facility] once a bed was available.       Tobacco use - reducing and wants to quit with support from nicotine gum.      Hepatitis C Status - Nonreactive 11/14/2019              HIV status - Nonreactive 11/14/2019     Date of last use:   Heroin 11/13/2019   THC - 11/2019 prior to [facility]     HPI:    12/26/2019  Patient reports his transition to [facility] was moved to next Monday 12/30/2019 instead of tomorrow due to them not taking admissions on Fridays and no opening today.  He reports he is bummed about the delay but is otherwise feeling \"great\".  Family visited for the holiday yesterday.  He does report some stress related to his relationship with his mom as he found cocaine in her home prior to him going to treatment.  She has denied it was hers until he confronted her " "about it.  He has been working through some of this stress in individual therapy while in .  He states he was given referrals for some therapists to work with in the future but he is hesitant because he said he feels awkward in the first few visits.  Encouraged him to work through the discomfort.  Also encouraged him to find a sponsor to aid him in working on the 12-steps.      Upper left chest rash continues to improve, non-painful and smaller in size.  He reports his focus has improved with the wellbutrin and denies side effects.  Mood has been \"Great\" and is somewhat nervous about future holidays or gatherings with family as they often are drinking.  He has a history of excessive alcohol use as a teen.  Discussed risk of relapse on opioids with drinking.  He intends to remain abstinent from all substances.  Sleeping well, taking Trazodone 100 mg at bedtime without side effects.  He has an appointment with 7th floor clinic to establish primary care.       Status since last visit:      Since last visit patient has been: stable   Intensity:     There has been: no craving.      Suboxone Dose: adequate  Progression of Symptoms:     Cues to use and relapse triggers: none    Recovery program has been: active - in [facility], graduates 12/30/2019  Accompanying Signs & Symptoms:    Side Effects: none.    Sobriety:     Status: no use since last visit.      Drug Screen: obtained.   Precipitating factors:    Triggers have been: non-existent.   Alleviating factors:    Contact with sponsor has been: no sponsor but working on getting one    Family and support system has been: helpful.   Other Therapies Tried :     Patient has been going to recovery meetings: regularly while in [facility]    Patient has been participating in professional counseling/therapy: Individual therapy in  and referrals for outside therapists      Social History     Social History Narrative    Current living situation: currently living with his mom on the " [place] side of [place] while waiting to get into treatment; prior to that was living with a friend but after getting into an argument with his roommate he ended up staying on other people's couches for 2 weeks before going into detox    Marital status: single, never     Children: none    Employment/financial status: Unemployed currently; dropped out of high school senior year - was smoking THC; no GED but wants to get in future; previous jobs include  for [employer] - lost job for using as he was \"slacking\" at work; he reports 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     Problem list and histories reviewed & adjusted, as indicated.  Additional history: as documented    Patient Active Problem List    Diagnosis Date Noted     Chemical dependency (H) 12/01/2019     Priority: Medium     Substance abuse withdrawal with complication (H) 11/14/2019     Priority: Medium     Attention deficit disorder 12/15/2004     Priority: Medium     Epic       Cannabis abuse 12/15/2004     Priority: Medium     Epic         Current Medications:  buprenorphine HCl-naloxone HCl (SUBOXONE) 4-1 MG per film, Place 1 Film under the tongue 2 times daily  buPROPion (WELLBUTRIN XL) 150 MG 24 hr tablet, Take 1 tablet (150 mg) by mouth every morning  capsaicin (ZOSTRIX) 0.025 % external cream, Apply to rash three times daily as needed for pain  melatonin 3 MG tablet, Take 2 tablets (6 mg) by mouth At Bedtime  nicotine (NICODERM CQ) 14 MG/24HR 24 hr patch, " Place 1 patch onto the skin every 24 hours  nicotine (NICORETTE) 2 MG gum, Place 1-2 each (2-4 mg) inside cheek as needed for smoking cessation  nicotine (NICORETTE) 2 MG gum, Place 1-2 each (2-4 mg) inside cheek as needed for smoking cessation  traZODone (DESYREL) 50 MG tablet, Take 1-2 tablets ( mg) by mouth nightly as needed for sleep    Self Administer Medications: Behavioral Services      Allergies   Allergen Reactions     Codeine Unknown     Pt has been told.       REVIEW OF SYSTEMS:  General:  No acute withdrawal symptoms.  No recent infections or fever  Eyes:  No vision concerns.  No double vision.    Resp: No coughing, wheezing or shortness of breath  CV: No chest pains or palpitations  GI: No nausea, vomiting, abdominal pain, diarrhea.  No constipation  : No urinary frequency or dysuria    Musculoskeletal: No significant muscle or joint pains other than as above.  No edema  Neurologic: No numbness, tingling, weakness, problems with balance or coordination  Psychiatric: No acute concerns other than as above.   Skin: No rashes or areas of acute infection      OBJECTIVE                                                    LABS:  Labs reviewed.  Urine tox results POS: buprenorphine today.     Results for orders placed or performed in visit on 12/26/19   Urine Drugs of Abuse Screen Panel 13     Status: Abnormal   Result Value Ref Range    Cannabinoids (10-oow-5-carboxy-9-THC) Not Detected NDET^Not Detected ng/mL    Phencyclidine (Phencyclidine) Not Detected NDET^Not Detected ng/mL    Cocaine (Benzoylecgonine) Not Detected NDET^Not Detected ng/mL    Methamphetamine (d-Methamphetamine) Not Detected NDET^Not Detected ng/mL    Opiates (Morphine) Not Detected NDET^Not Detected ng/mL    Amphetamine (d-Amphetamine) Not Detected NDET^Not Detected ng/mL    Benzodiazepines (Nordiazepam) Not Detected NDET^Not Detected ng/mL    Tricyclic Antidepressants (Desipramine) Not Detected NDET^Not Detected ng/mL    Methadone  "(Methadone) Not Detected NDET^Not Detected ng/mL    Barbiturates (Butalbital) Not Detected NDET^Not Detected ng/mL    Oxycodone (Oxycodone) Not Detected NDET^Not Detected ng/mL    Propoxyphene (Norpropoxyphene) Not Detected NDET^Not Detected ng/mL    Buprenorphine (Buprenorphine) Detected, Abnormal Result (A) NDET^Not Detected ng/mL       PHYSICAL EXAM:  BP 90/60   Pulse 71   Temp 97.3  F (36.3  C) (Temporal)   Wt 73.7 kg (162 lb 8 oz)   SpO2 96%   BMI 25.45 kg/m      GENERAL APPEARANCE:  alert, comfortable appearing  EYES:Eyes grossly normal to inspection  NEURO:  Gait normal.  No tremor. Coordination intact.     MENTAL STATUS EXAM:  Appearance:  awake, alert, adequately groomed and no apparent distress  Attitude:  cooperative  Eye Contact:  good  Mood:  \"Great\"  Affect:  appropriate and in normal range and intensity is normal  Speech:  clear, coherent rate /rhythm are normal  Psychomotor Behavior:  no evidence of tardive dyskinesia, dystonia, or tics  Throught Process:  logical, linear and goal oriented  Associations:  no loose associations  Thought Content:  no evidence of suicidal ideation or homicidal ideation, no evidence of psychotic thought, no auditory hallucinations present and no visual hallucinations present  Insight:  good  Judgement:  intact  Oriented to:  time, person, and place  Attention Span and Concentration:  intact  Recent and Remote Memory:  intact  Language fund of knowledge are adequate      ASSESSMENT/PLAN                                                      (F11.20) Opioid use disorder, moderate, dependence (H)  (primary encounter diagnosis)  Comment: No return to use, cravings or side effects.  Graduates [facility] on 12/30/2019  Plan: buprenorphine HCl-naloxone HCl (SUBOXONE) 4-1         MG per film; Place 1 film under the tongue twice daily.      (F90.0) Attention deficit hyperactivity disorder (ADHD), predominantly inattentive type  Comment: By history - previously didn't tolerate " stimulants and not advised given addiction history. Focus improved with Wellbutrin at current dose.    Plan: Continue Wellbutrin  mg daily.  Will evaluate need for medication dose increase in the future.     (G47.00) Insomnia, unspecified type  Comment: Sleep improved with Trazodone 100 mg nightly and sobriety.    Plan: Continue Trazodone 50- 100 mg nightly as needed an evaluate need for continued treatment as patient gains more sobriety and sleep improves.         ENCOUNTER FOR LONG TERM USE OF HIGH RISK MEDICATION   High Risk Drug Monitoring?  YES   Drug being monitored: Buprenorphine   Reason for drug: Opioid use disorder   What is being monitored?: Dosage, Cravings, Trigger, side effects, and continued abstinence.      Follow-up: 2 weeks    Patient counseling completed today:  Counseled the patient on the importance of having a recovery program in addition to Suboxone maintenance.  Also discussed having a sober support network, not isolating, being open, honest, and willing to change, avoiding triggers and managing cravings.  In addition, abstinence from alcohol, benzodiazepines, THC, opioids and other drugs of abuse was recommended.  Risk of overdose following a period of abstinence due to decrease tolerance was discussed including risk of death.  Risk of overdose if using Suboxone with other substances particuarly benzodiazepines, alcohol, gabapentin, or other CNS depressants was reviewed.      Jasmin Coppola, CNP  [facility] - Addiction Medicine  [facility]  [phone]

## 2019-12-27 ENCOUNTER — HOSPITAL ENCOUNTER (OUTPATIENT)
Dept: BEHAVIORAL HEALTH | Facility: CLINIC | Age: 29
End: 2019-12-27
Attending: FAMILY MEDICINE
Payer: MEDICAID

## 2019-12-27 PROCEDURE — H2035 A/D TX PROGRAM, PER HOUR: HCPCS | Mod: HQ

## 2019-12-27 PROCEDURE — 10020000 ZZH LODGING PLUS FACILITY CHARGE ADULT

## 2019-12-28 ENCOUNTER — HOSPITAL ENCOUNTER (OUTPATIENT)
Dept: BEHAVIORAL HEALTH | Facility: CLINIC | Age: 29
End: 2019-12-28
Attending: FAMILY MEDICINE
Payer: MEDICAID

## 2019-12-28 PROCEDURE — H2035 A/D TX PROGRAM, PER HOUR: HCPCS | Mod: HQ

## 2019-12-28 PROCEDURE — H2035 A/D TX PROGRAM, PER HOUR: HCPCS

## 2019-12-28 PROCEDURE — 10020000 ZZH LODGING PLUS FACILITY CHARGE ADULT

## 2019-12-29 ENCOUNTER — HOSPITAL ENCOUNTER (OUTPATIENT)
Dept: BEHAVIORAL HEALTH | Facility: CLINIC | Age: 29
End: 2019-12-29
Attending: FAMILY MEDICINE
Payer: MEDICAID

## 2019-12-29 PROCEDURE — H2035 A/D TX PROGRAM, PER HOUR: HCPCS | Mod: HQ

## 2019-12-29 PROCEDURE — 10020000 ZZH LODGING PLUS FACILITY CHARGE ADULT

## 2019-12-30 NOTE — PROGRESS NOTES
Focus of Treatment / Discharge Recommendations  Personal Safety/ Management of Symptoms:  Follow your safety plan.  Report increased symptoms to your care team or go to the nearest Emergency Department.   Call crisis lines as needed:     [facility]       [phone]                  [facility]               [phone]  [facility]       [phone]                 Crisis Connection                   [phone]  [facility]      [phone]                 [facility]         [phone]  [facility]  [phone]                 National Suicide Prevention   [phone]  [facility]    [phone]                 Suicide Prevention                  [phone]  [facility]         [phone]    Abstinence/Relapse Prevention  * Take all medicines as directed.  Carry a current list of medicines with you.  * Use coping skills: nutrition, rest, exercise, spirituality, journal, meditation, engage in activities you enjoy, seek sober support  * Do not use illicit (street) drugs, controlled substances (narcotics) or alcohol.    Develop/Improve Independent Living/Socialization Skills: Ensure living environment is conducive to recovery    Community Resources/Supports:     [facility] Alcoholics Anonymous:  [phone] ( 24 hours a day)    [facility]  Alcoholics Anonymous : [phone]    Narcotics Anonymous : [address] ( [phone])    [place] Recovery Connection: Recovery Resources.  [phone]    Access chats, online meetings, discussions and healthy check-in activities any day, any time, from anywhere. Participate as anonymously as you like. In order to access this resource, Go to: [employer]   Click on the  In recovery  tab. Then click on  Social Community . Click on  The daily Pledge: people helping people 24/7  to join.      Discharge Planning: Maintain abstinence from all mood altering substances.  Attend two or more supportive  meetings such as AA/NA meetings per week. Obtain a sponsor and maintain regular contact with a program sponsor. Enter the [facility] program and follow recommendations, enter 1.1 therapy. Maintain good physical and mental health care.  Follow up with psychiatrist / main caregiver:  Patient to follow up with an appointment with Abdon Castellanos CPN  within 2 weeks of last appointment on 12/26/19 ( est. 1/9/20)  and with  PCP to( est)  care Kianna Nguyen [facility] 12/31.                                                                          Follow up with your therapist: Next visit: TBD Patient was given referrals     Go to group therapy and / or support groups at: [facility] and or within community     See your medical doctor about:  Ongoing physical health care    Client Signature:_______________________   Date / Time:___________  Staff Signature:________________________   Date / Time:___________

## 2019-12-30 NOTE — PROGRESS NOTES
[address] [place] [address]          Bebeto Alvarado, 1990, was admitted for evaluation/treatment of chemical dependency at [facility].  This person took part in these program(s):    ______ The Inpatient Program   ______ The Outpatient Program   ___x___ [facility]   ______ [facility]       Date admitted: 11/29/19  Date discharged: 12/30/19    Type of discharge:   ___x___ Satisfactory - completed evaluation / treatment   ______ Discharged without completing   ______ Behavioral discharge   ______ Transferred to another chemical dependency program   ______ Transferred to another type of service   ______ Left against medical advice (AMA) / [facility]       Comments: Maintain abstinence from all mood altering substances.  Attend two or more supportive meetings such as AA/NA meetings per week. Obtain a sponsor and maintain regular contact with a program sponsor. Enter the [facility] program and follow recommendations, enter 1.1 therapy. Maintain good physical and mental health care.      Counselor: YOVANA Reeves                       Date: 12/30/2019             Time: 9:10 AM

## 2019-12-31 ENCOUNTER — OFFICE VISIT (OUTPATIENT)
Dept: FAMILY MEDICINE | Facility: CLINIC | Age: 29
End: 2019-12-31
Payer: MEDICAID

## 2019-12-31 VITALS
WEIGHT: 165.2 LBS | TEMPERATURE: 97.6 F | BODY MASS INDEX: 25.87 KG/M2 | SYSTOLIC BLOOD PRESSURE: 130 MMHG | HEART RATE: 86 BPM | OXYGEN SATURATION: 97 % | DIASTOLIC BLOOD PRESSURE: 66 MMHG

## 2019-12-31 DIAGNOSIS — F11.11 HISTORY OF HEROIN ABUSE (H): ICD-10-CM

## 2019-12-31 DIAGNOSIS — Z71.6 ENCOUNTER FOR SMOKING CESSATION COUNSELING: Primary | ICD-10-CM

## 2019-12-31 DIAGNOSIS — F11.20 OPIOID DEPENDENCE, UNCOMPLICATED (H): ICD-10-CM

## 2019-12-31 DIAGNOSIS — Z00.00 ENCOUNTER FOR MEDICAL EXAMINATION TO ESTABLISH CARE: ICD-10-CM

## 2019-12-31 PROBLEM — F19.139 SUBSTANCE ABUSE WITHDRAWAL WITH COMPLICATION (H): Status: RESOLVED | Noted: 2019-11-14 | Resolved: 2019-12-31

## 2019-12-31 PROCEDURE — 99203 OFFICE O/P NEW LOW 30 MIN: CPT | Performed by: NURSE PRACTITIONER

## 2019-12-31 RX ORDER — NICOTINE 21 MG/24HR
1 PATCH, TRANSDERMAL 24 HOURS TRANSDERMAL EVERY 24 HOURS
Qty: 14 PATCH | Refills: 0 | Status: SHIPPED | OUTPATIENT
Start: 2019-12-31 | End: 2020-02-03

## 2019-12-31 NOTE — PROGRESS NOTES
SUBJECTIVE:  Bebeto Alvarado, a 29 year old male, here today for an appointment to establish care and to discuss the following issues:    1. F/U from [facility] 3-4 weeks in [facility]  Drugs heroin , on suboxone now    Nicotine patch and gum  14 mg not doing it   Smoked about a pack per day     Mood and anxiety stable on welbutrin and trazodone, melatonin for sleep   Addiction med provider just did refills     HPI:    Where was your previous physician's office?: [facility]   Physician's Name: Dr. Alexandra  Last physical: unknown  Pt permission to access these records? (Care Everywhere or ANAIS): yes      If patient has been seen at a [facility] before, please disregard the question below.      Past Medical History:   Diagnosis Date     Opiate abuse, continuous (H)      Tobacco use        History reviewed. No pertinent surgical history.    Family History   Problem Relation Age of Onset     Diabetes Mother      Hypertension Mother      No Known Problems Father      No Known Problems Brother      Suicide Other        Social History     Tobacco Use     Smoking status: Current Every Day Smoker     Packs/day: 0.50     Types: Cigarettes     Smokeless tobacco: Never Used   Substance Use Topics     Alcohol use: Not Currently       ROS:  CV: NEGATIVE for chest pain, palpitations   GI: NEGATIVE for nausea, abdominal pain, heartburn, or change in bowel habits  : NEGATIVE for frequency, dysuria, or hematuria  C: NEGATIVE for fever, chills  E: NEGATIVE for vision changes   R: NEGATIVE for significant cough or SOB  M: NEGATIVE for significant arthralgias or myalgia  N: NEGATIVE for weakness, dizziness or paresthesias or headache  I: no rashes or communicable disease history     OBJECTIVE:    /66   Pulse 86   Temp 97.6  F (36.4  C) (Oral)   Wt 74.9 kg (165 lb 3.2 oz)   SpO2 97%   BMI 25.87 kg/m      EXAM:  GENERAL APPEARANCE: healthy, alert and no distress  EYES: EOMI,   PERRL  RESP: lungs clear to auscultation - no rales, rhonchi or wheezes  CV: regular rates and rhythm, normal S1 S2, no S3 or S4 and no murmur, click or rub -  ABDOMEN:  soft, nontender, no HSM or masses and bowel sounds normal    ASSESSMENT/PLAN:    ICD-10-CM    1. Encounter for smoking cessation counseling Z71.6 nicotine (NICODERM CQ) 21 MG/24HR 24 hr patch   2. Encounter for medical examination to establish care Z00.00    3. Opioid dependence, uncomplicated (H) F11.20    4. History of heroin abuse (H) F11.11     established care, continue treatment program and suboxone through Addiction med here  Discussed higher dose of nicoderm and step down therapy, notify if any concners    Will refill welbutrin and trazodone when due, can change to 3 mo supply at health maintenance exam in 1-2 months     Continue working on good self care Return to Clinic earlier if any concerns    Declined flu and PCV shots today, discussed risks of not vaccinating    Kianna SHAIKH CNP

## 2019-12-31 NOTE — PATIENT INSTRUCTIONS
Come fasting for your health maintenance exam (nothing to eat or drink hours before other than water for 8 hours)

## 2020-01-01 NOTE — PROGRESS NOTES
CHEMICAL DEPENDENCY DISCHARGE SUMMARY      EVALUATION COUNSELOR:  YOVANA Madsen   TREATMENT COUNSELORS:  PAOLO Farah, [facility] and Ayaz BOONE, [facility].   REFERRAL SOURCE:  [facility]'s Adult Inpatient Detox Unit.   PROGRAM:  Adult Chemical Dependency [facility].   ADMISSION DATE:  11/29/2019.   DISCHARGE DATE:  12/30/2019.   LAST SEEN DATE:  12/29/2019.   ADMISSION DIAGNOSES:   1.  304.00/F11.20, Opioid use disorder, severe.   2.  305.10/F17.20, Tobacco use disorder, moderate.   DISCHARGE DIAGNOSES:     1.  304.00/F11.20, Opioid use disorder, severe.   2.  305.10/F17.20, Tobacco use disorder, moderate.   DISCHARGE STATUS:  Tavo Alvarado completed the [facility] with staff approval.   LAST USE DATE:  According to the patient's chemical dependency assessment, his last use date of heroin was on 11/14/2019.      DAYS OF TREATMENT COMPLETED:  31.      PRESENTING INFORMATION:  Tavo came to the [facility] seeking admission to the detox unit and treatment.  He reported that he was sick of living the way that he was, in which he was using drugs every day and wanted to live a better life.  He reported that he was using on a daily basis 0.5-1 gram per day.  The patient was admitted to the inpatient detox unit and transferred to [facility] once medically stable.      SERVICES PROVIDED:  Our services included assessment, treatment planning and education regarding chemical dependency, mental illness and relapse prevention.  The patient also participated in individual and group therapy, recovery oriented workshops, spiritual care counseling, recovery skills training and aftercare planning.      ISSUES ADDRESSED IN TREATMENT:   DIMENSION 1:  Acute Intoxication/Withdrawal Potential:  Risk level at the time of admission was a 1; current risk level is a 1.  Tavo was on Suboxone maintenance while in [facility] and continued to  "follow recommendations of his prescribing physician and remained medication compliant.  He has been instructed to follow up with his Suboxone provider following [facility].      DIMENSION 2:  Biomedical Conditions and Complaints:  Risk level at the time of admission was a 0; current risk level is a 0.  Tavo did not have any problems or issues in this area.      DIMENSION 3:  Emotional/Behavioral/Cognitive Conditions and Complications:  Risk level at the time of admission was a 2; current risk level is a 1.  Tavo reported that he had symptoms of depression but had never had a mental health assessment  The goal while he was in treatment was to meet with a staff therapist for one-on-one therapy and to assess any mental health needs.  He engaged in therapy on a weekly basis and was able to address these.  Tavo's suicide risk rating at the time of his admission to [facility] was low/no current risk and his discharge rating continues to be the same.  While in treatment, he completed a safety plan template.      DIMENSION 4:  Readiness to Change:  Risk level at the time of admission was a 2; current risk level is a 0.  Tavo lacked acceptance of his powerlessness related to his addiction.  While in treatment, he completed and presented in group a first step assignment in which he identified areas of powerlessness related to his use.  This allowed for him to admit his own powerlessness and gain awareness of the first step.  Tavo lacked understanding of addiction as a disease.  He was able to gain understanding of this concept by reading the packet \"Addictive Disease\" and writing a two page reflection paper on what the disease of addiction is and what is necessary for his own recovery.  He shared this in a group session.  Tavo had continue to use despite increasing consequences.  He while in treatment, was able to understand that addiction and his consequences are progressive by completing and presenting in group his " "chemical use history, progression and consequences assignment and included in this the values that he had violated.  Humberto has a history of continuing to use despite the impact of his substance use on his life, he completed and presented in group the assignment \"What [place] Am I willing To?\"  This allowed for him to understand the impact of his substance use on his life and the different aspects.  Tavo lacked a connection with his spirituality and a higher power.  While in treatment, he began the process of connecting with his spirituality.  He did this by reading the packet \"Step 2\" and completed and presented in group the assignment \"Step 2:  Finding Hope.\"  Tavo had never committed to the actions necessary for long-term recovery.  While in treatment, he was able to start making that commitment to what is necessary for him to remain sober.  He did this by completing and presenting in group the assignment \"Step 3:  Deciding To Turn It Over.\"      DIMENSION 5:  Relapse, Continued Use, Continued Problem Potential:  Risk level at the time of admission was a 4; current risk level is a 3.  In the past Tavo had not taken responsibility for his own recovery.  He read the packet \"Taking Responsibility\" and wrote a two page reflection paper on what he learned and the things that he had failed to take responsibility for in the past as well as how he will be responsible for moving forward in his own recovery.  He shared this in the group session.  This allowed for him to begin to take responsibility for the effects of his addiction and what is necessary for his recovery.  Tavo had attitudes that had led to relapse.  He began to be able to identify these attitudes while in [facility] by completing the packet \"An Attitude Inventory For People Who Relapse Quickly After Treatment\" and sharing it in the group session.  Tavo had patterns of dishonesty that had sabotaged his previous attempts at recovery.  He was able to begin to " "connect with honesty as a necessary asset in his recovery by reading the packet \"Honesty\" and writing a two page reflection paper on why honesty is essential in recovery and what he can hope for if he is honest.  Tavo had resentments that had assisted in self-sabotaging his recovery.  He began to identify and process his resentments by reading the packet \"Getting Rid Of Resentments\" and writing down his major resentments including why he resented the particular person or organization, what he felt he was a owed that he did not receive and things that he had done wrong in the situation.  He then shared this in a group session.  Tavo lacked recognition and awareness of his personal relapse triggers and warning signs.  He began to be able to identify these and to build a plan of recovery by completing and presenting in group a relapse prevention plan entitled \"Putting It All Together.\"      DIMENSION 6:  Recovery Environment:  Risk level at the time of admission was a 4; current risk level is a 3.  When Tavo entered [facility] he was homeless.  While in treatment, he worked with the program  to secure housing and aftercare programming at the [facility].  Tavo lacked a sober support network.  He began to build and develop this while in [facility] by attending multiple 12-step meetings on a weekly basis and beginning the process of looking for a sponsor.      STRENGTHS:  Tavo maintained a positive attitude while he was in treatment and was able to exhibit many leadership skills amongst his peers for good behavior.  He was an active participant in the group settings and was always open for feedback from his peers.  He worked to remain open and honest and earnestly attempted to challenge himself to work on self-improvement.  Tavo appears motivated for recovery at this time and willing to incorporate positive changes into his life.      PROGNOSIS:  The prognosis for Tavo is " favorable.  This prognosis can be maintained by following through with the continuing care recommendations below.      LIVING ARRANGEMENTS AT DISCHARGE:  Tavo is discharged to the [facility].      CONTINUING CARE RECOMMENDATIONS AND REFERRALS:   1.  Abstain from all mood-altering chemicals except those prescribed as non-addictive.   2.  Attendance at a minimum of two 12-step meetings per week or however many are recommended through the Transitions Program.   3.  Obtain a permanent male sponsor and maintain regular contact with him.   4.  Admit immediately to [facility], complete program and follow recommendations of staff.   5.  Monitor and comply with advice of doctor regarding mental health and physical health issues.  Take all medications as prescribed.   6.  Follow up with Suboxone provider and remain medication compliant.   7.  Continue to invest in building a sober support network.   8.  Continue to monitor and gain understanding of relapse triggers and stressors through the use and development of healthy coping skills.         This information has been disclosed to you from records protected by Federal confidentiality rules (42 CFR part 2). The Federal rules prohibit you from making any further disclosure of this information unless further disclosure is expressly permitted by the written consent of the person to whom it pertains or as otherwise permitted by 42 CFR part 2. A general authorization for the release of medical or other information is NOT sufficient for this purpose. The Federal rules restrict any use of the information to criminally investigate or prosecute any alcohol or drug abuse patient.      PAOLO KYLE, [facility]             D: 2019   T: 2019   MT: LATANYA      Name:     BRANDY ROSEN   MRN:      [id number]        Account:      [id number]   :      1990           Visit Date:   2019      Document: [id number]

## 2020-01-08 NOTE — PROGRESS NOTES
SUBJECTIVE                                                    CC: Patient presents with:  Addiction Problem    BUPRENORPHINE FOLLOW UP: current dose suboxone 4-1 mg film BID.     Bebeto Alvarado is a 29 year old male who presents to clinic today for follow up of Buprenorphine.      Date of last visit:  12/26/2019  No-shows/Late cancels: None    Primary Care Provider: NEEL Tariq CNP   [place] Board of Pharmacy Data Base Reviewed:    Yes; reviewed and no concerns for diversionary activity.  Most recent data includes:  12/26/2019 1 12/26/2019 Suboxone 4 Mg-1 Mg Sl Film  #28.00     14    Brief History:    Initial visit with me on 11/20/2019  Recreational use of mushrooms and marijuana as a teen.  At 18 started using oxycontin orally at parties then started smoking heroin at age 20.  Around age [age] started using heroin daily to avoid withdrawal symptoms.  Detox and treatment at [facility] in 10/2018 for one week but asked to leave as he was accused of buying drugs on the campus.  Started using heroin IV in 06/2019 1/2 to 1 gram daily.  Detox admission 11/15/2019 -11/19/2019 and went to [facility] once a bed was available.  Graduated and transitioned to [facility] on 12/30/2019 (OP treatment with sober housing).      Tobacco use - reducing, 5 cigarettes daily and wants to quit with support from nicotine gum and nicotine pataches     Hepatitis C Status - Nonreactive 11/14/2019              HIV status - Nonreactive 11/14/2019    Date of last use:   Heroin 11/13/2019   THC - 11/2019 prior to [facility]    HPI:    1/9/2020  Status since last visit: stable     There has been: no craving.      Suboxone Dose: adequate    Side effects - none     [facility] on 12/30/2019 and has been a difficult transition with rule changes, feeling the space in unclean and not liking his two roommates as much as he did in .  Working out on site in their gym which has helped some with mood and frustration.   Going to meetings a few times throughout the week and Sikhism on Sunday.  Treatment program approved him to go on pass and bring roommates to meetings with him which he is glad about.  Doesn't like the restrictions on his freedom with curfew.  Applied to [employer] company and is waiting to hear back.      Rash on upper chest almost gone and is non tender or itchy.  Individual therapy - not started yet but needs to call to make an appointment.  Some headaches the last week that come and go, needs to drink more water per his report.  Had a physical with Day Nguyen CNP upstairs that went well.  Denies other physical concerns.     Wellbutrin  mg daily that he takes around Noon and reports low mood at [facility] and thinking more about the negatives.  Requesting an increase in his Wellbutrin due to difficult time focusing, low mood that is difficult to shake and trouble with motivation.  He has been more hungry, and sleep is going well with Trazodone 100 mg at bedtime.  Denies SI, SIB.  Temporary sponsor, scared to commit to someone for fear of it not going well.  Nicotine Patch 21 mg  Daily from Day Nguyen CNP and is now smoking 5 cigs a day. Some interest in complete smoking cessation and reports cigarettes taste worse with wellbutrin which he appreciates.     Switched to [facility] - effective 02/01/2020 follow up on 2/3/2020 due to insurance.   Will give 2 weeks of suboxone and patient to call for a refill to prevent inability to pay for office visit as he accidentally chose [facility] and was informed that we do not accept that insurance so he switched to [facility].  Will follow up in clinic on 02/03/2020 or call sooner with any concerns.     Social History     Social History Narrative    Current living situation: prior to treatment was living with [place] mom [place] while waiting to get into treatment; prior to that was living with a friend but after getting  "into an argument with his roommate he ended up staying on other people's couches for 2 weeks before going into detox.  Now is living in [place] [facility] with two roommates.     Marital status: single, never     Children: none        Employment/financial status: Unemployed currently; dropped out of high school senior year - was smoking THC; no GED but wants to get in future; previous jobs include  for [employer] - lost job for using as he was \"slacking\" at work; he reports interest in working in customer service or sales in the future    Current recovery program (meetings, sponsorship): Graduated from [employer] - transitioned to [facility] living with OP treatment, attending NA meetings    Transportation: own car and license    Legal hx: on probation for 5th degree possession of a fake prescription for opioids that a \"friend\" talked him into trying to get filled at the pharmacy; 1 year of probation left; no pending court dates     Social History:   Reviewed - See above for changes since last visit.     Problem list and histories reviewed & adjusted, as indicated.  Additional history: as documented    Patient Active Problem List    Diagnosis Date Noted     History of heroin abuse (H) 12/31/2019     Priority: Medium     Opioid dependence, uncomplicated (H) 12/01/2019     Priority: Medium     Attention deficit disorder 12/15/2004     Priority: Medium     Epic       Cannabis abuse 12/15/2004     Priority: Medium     Epic         Current Medications:  buprenorphine HCl-naloxone HCl (SUBOXONE) 4-1 MG per film, Place 1 Film under the tongue 2 times daily  buPROPion (WELLBUTRIN XL) 150 MG 24 hr tablet, Take 1 tablet (150 mg) by mouth every morning  melatonin 3 MG tablet, Take 2 tablets (6 mg) by mouth At Bedtime  nicotine (NICODERM CQ) 14 MG/24HR 24 hr patch, Place 1 patch onto the skin every 24 hours  nicotine (NICODERM CQ) 21 MG/24HR 24 hr patch, Place 1 patch onto the " skin every 24 hours  nicotine (NICORETTE) 2 MG gum, Place 1-2 each (2-4 mg) inside cheek as needed for smoking cessation  nicotine (NICORETTE) 2 MG gum, Place 1-2 each (2-4 mg) inside cheek as needed for smoking cessation  traZODone (DESYREL) 50 MG tablet, Take 1-2 tablets ( mg) by mouth nightly as needed for sleep    No current facility-administered medications on file prior to visit.       Allergies   Allergen Reactions     Codeine Unknown     Pt has been told.       REVIEW OF SYSTEMS:  General:  No acute withdrawal symptoms.  No apparent distress. No recent infections or fever  Eyes:  No vision concerns.  No double vision.    Resp: No coughing, wheezing or shortness of breath  CV: No chest pains or palpitations  GI: No nausea, vomiting, abdominal pain, diarrhea.  No constipation  : No urinary frequency or dysuria    Musculoskeletal: No significant muscle or joint pains other than as above.  No edema  Neurologic: +++headaches on/off the last week; No numbness, tingling, weakness, problems with balance or coordination  Psychiatric: No acute concerns other than as above.   Skin: ++ upper chest rash healing; NO areas of acute infection      OBJECTIVE                                                    LABS:  Labs reviewed.  Urine tox results POS: buprenorphine today.     Results for orders placed or performed in visit on 01/09/20   Urine Drugs of Abuse Screen Panel 13     Status: Abnormal   Result Value Ref Range    Cannabinoids (27-ekz-2-carboxy-9-THC) Not Detected NDET^Not Detected ng/mL    Phencyclidine (Phencyclidine) Not Detected NDET^Not Detected ng/mL    Cocaine (Benzoylecgonine) Not Detected NDET^Not Detected ng/mL    Methamphetamine (d-Methamphetamine) Not Detected NDET^Not Detected ng/mL    Opiates (Morphine) Not Detected NDET^Not Detected ng/mL    Amphetamine (d-Amphetamine) Not Detected NDET^Not Detected ng/mL    Benzodiazepines (Nordiazepam) Not Detected NDET^Not Detected ng/mL    Tricyclic  "Antidepressants (Desipramine) Not Detected NDET^Not Detected ng/mL    Methadone (Methadone) Not Detected NDET^Not Detected ng/mL    Barbiturates (Butalbital) Not Detected NDET^Not Detected ng/mL    Oxycodone (Oxycodone) Not Detected NDET^Not Detected ng/mL    Propoxyphene (Norpropoxyphene) Not Detected NDET^Not Detected ng/mL    Buprenorphine (Buprenorphine) Detected, Abnormal Result (A) NDET^Not Detected ng/mL       PHYSICAL EXAM:  /66   Pulse 88   Temp 98  F (36.7  C) (Temporal)   Resp 16   Ht 1.651 m (5' 5\")   Wt 72.6 kg (160 lb)   SpO2 98%   BMI 26.63 kg/m      GENERAL APPEARANCE:  alert, comfortable appearing  EYES:Eyes grossly normal to inspection  NEURO:  Gait normal.  No tremor. Coordination intact.     MENTAL STATUS EXAM:  Appearance:  awake, alert, well groomed and no apparent distress  Attitude:  cooperative  Eye Contact:  good  Mood:  depressed  Affect:  appropriate and in normal range and mood congruent  Speech:  clear, coherent and normal prosody rate /rhythm are normal  Psychomotor Behavior:  no evidence of tardive dyskinesia, dystonia, or tics  Throught Process:  logical, linear and goal oriented  Associations:  no loose associations  Thought Content:  no evidence of suicidal ideation or homicidal ideation, no evidence of psychotic thought, no auditory hallucinations present and no visual hallucinations present  Insight:  fair  Judgement:  intact  Oriented to:  time, person, and place  Attention Span and Concentration:  intact  Recent and Remote Memory:  intact  Language fund of knowledge are adequate      ASSESSMENT/PLAN                                                      (F11.20) Opioid use disorder, moderate, dependence (H)  (primary encounter diagnosis)  Comment: No use since 11/13/2019, no cravings with suboxone.  No side effects.   Plan: Continue buprenorphine HCl-naloxone HCl (SUBOXONE) 4-1 MG per film BID.  Continue to attend OP programming and be mindful of the sober living " "rules as they are temporary and meant to increase structure and support.     (F33.1) Moderate episode of recurrent major depressive disorder (H)  Comment: Lower mood, energy, and focus since transition to [facility] about 2 weeks ago.  No side effects from Wellbutrin.   Plan: Increase to buPROPion (WELLBUTRIN XL) 300 MG 24 hr tablet daily in the MORNING.  Continue daily exercise and mindfulness.  Call to schedule individual therapy appointment.     (G47.00) Insomnia, unspecified type  Comment: Sleeping well with 100 mg trazodone.  Discussed not taking wellbutrin in afternoon to prevent sleep disruption.   Plan: traZODone (DESYREL) 50 MG tablet; take 1-2 tablets by mouth as needed for sleep.     (F90.0) Attention deficit hyperactivity disorder (ADHD), predominantly inattentive type  Comment: More difficulty focusing since transition to [facility], likely related to low mood rather than underlying ADHD.   Plan: Increase to buPROPion (WELLBUTRIN XL) 300 MG 24 hr tablet daily in the MORNING.     (F17.200) Tobacco use disorder  Comment: Nicotine patch 21 mg daily and smoking about 5 cigarettes daily.  Interested in quitting but minimally committed to this at this time due to high stress right now.  Wellbutrin made cigarettes taste \"terrible\" according to patient.   Plan: Continue nicotine patch 21 mg daily and will increase wellbutrin to  mg daily. Continue to encourage cessation.     (Z79.629) High risk medication use  Comment: suboxone  Plan: routine UDS, monitor for cravings and return to use       ENCOUNTER FOR LONG TERM USE OF HIGH RISK MEDICATION   High Risk Drug Monitoring?  YES   Drug being monitored: Buprenorphine   Reason for drug: Opioid use disorder   What is being monitored?: Dosage, Cravings, Trigger, side effects, and continued abstinence.      Follow-up: on 02/03/2020 due to insurance issues as our clinic does not accept [employer].  Patient switched to [employer] but this will not " be active until 02/01/2020.  Patient aware that he is to call prior to running out of suboxone for a refill to bridge him until his appointment.         Patient counseling completed today:  Counseled the patient on the importance of having a recovery program in addition to Suboxone maintenance.  Also discussed scheduling individual therapy to assist in mood management, having a sober support network, not isolating, being open, honest, and willing to change, avoiding triggers and managing cravings.  In addition, abstinence from alcohol, benzodiazepines, THC, opioids and other drugs of abuse was recommended.  Risk of overdose following a period of abstinence due to decrease tolerance was discussed including risk of death.  Risk of overdose if using Suboxone with other substances particuarly benzodiazepines, alcohol, gabapentin, or other CNS depressants was reviewed.      Jasmin Coppola CNP  [facility] - Addiction Medicine  [facility]  [phone]

## 2020-01-09 ENCOUNTER — OFFICE VISIT (OUTPATIENT)
Dept: ADDICTION MEDICINE | Facility: CLINIC | Age: 30
End: 2020-01-09
Payer: COMMERCIAL

## 2020-01-09 VITALS
RESPIRATION RATE: 16 BRPM | OXYGEN SATURATION: 98 % | HEART RATE: 88 BPM | BODY MASS INDEX: 26.66 KG/M2 | TEMPERATURE: 98 F | HEIGHT: 65 IN | DIASTOLIC BLOOD PRESSURE: 66 MMHG | SYSTOLIC BLOOD PRESSURE: 120 MMHG | WEIGHT: 160 LBS

## 2020-01-09 DIAGNOSIS — G47.00 INSOMNIA, UNSPECIFIED TYPE: ICD-10-CM

## 2020-01-09 DIAGNOSIS — F17.200 TOBACCO USE DISORDER: ICD-10-CM

## 2020-01-09 DIAGNOSIS — F11.20 OPIOID USE DISORDER, MODERATE, DEPENDENCE (H): Primary | ICD-10-CM

## 2020-01-09 DIAGNOSIS — Z79.899 HIGH RISK MEDICATION USE: ICD-10-CM

## 2020-01-09 DIAGNOSIS — F33.1 MODERATE EPISODE OF RECURRENT MAJOR DEPRESSIVE DISORDER (H): ICD-10-CM

## 2020-01-09 DIAGNOSIS — F90.0 ATTENTION DEFICIT HYPERACTIVITY DISORDER (ADHD), PREDOMINANTLY INATTENTIVE TYPE: ICD-10-CM

## 2020-01-09 PROCEDURE — 99214 OFFICE O/P EST MOD 30 MIN: CPT | Performed by: NURSE PRACTITIONER

## 2020-01-09 PROCEDURE — 80306 DRUG TEST PRSMV INSTRMNT: CPT | Performed by: NURSE PRACTITIONER

## 2020-01-09 RX ORDER — BUPROPION HYDROCHLORIDE 300 MG/1
300 TABLET ORAL EVERY MORNING
Qty: 30 TABLET | Refills: 0 | Status: SHIPPED | OUTPATIENT
Start: 2020-01-09 | End: 2020-02-03 | Stop reason: DRUGHIGH

## 2020-01-09 RX ORDER — TRAZODONE HYDROCHLORIDE 50 MG/1
50-100 TABLET, FILM COATED ORAL
Qty: 60 TABLET | Refills: 1 | Status: SHIPPED | OUTPATIENT
Start: 2020-01-09 | End: 2020-03-31

## 2020-01-09 RX ORDER — BUPRENORPHINE AND NALOXONE 4; 1 MG/1; MG/1
1 FILM, SOLUBLE BUCCAL; SUBLINGUAL 2 TIMES DAILY
Qty: 28 FILM | Refills: 0 | Status: SHIPPED | OUTPATIENT
Start: 2020-01-09 | End: 2020-01-23

## 2020-01-09 ASSESSMENT — MIFFLIN-ST. JEOR: SCORE: 1612.64

## 2020-01-09 NOTE — PATIENT INSTRUCTIONS
-  Provided 2 weeks of suboxone and call for a refill before you are out of your suboxone and to check in.     - Increase to Wellbutrin  mg tablets; take 1 tablet(300 mg daily) and would recommend taking this in the morning to prevent sleep issues.    -Refilled your trazodone today.

## 2020-01-23 ENCOUNTER — TELEPHONE (OUTPATIENT)
Dept: ADDICTION MEDICINE | Facility: CLINIC | Age: 30
End: 2020-01-23

## 2020-01-23 DIAGNOSIS — F11.20 OPIOID USE DISORDER, MODERATE, DEPENDENCE (H): ICD-10-CM

## 2020-01-23 RX ORDER — BUPRENORPHINE AND NALOXONE 4; 1 MG/1; MG/1
1 FILM, SOLUBLE BUCCAL; SUBLINGUAL 2 TIMES DAILY
Qty: 24 FILM | Refills: 0 | Status: SHIPPED | OUTPATIENT
Start: 2020-01-23 | End: 2020-02-03

## 2020-01-23 NOTE — TELEPHONE ENCOUNTER
Last office visit 1/9/20.   Next office visit not scheduled due to insurance.    Patient was given a 14 day supply, which last until yesterday. Will route to provider for plan for Suboxone between now and 2/1/20.     RX pended to reach 2/1/20 when patient should be able to schedule with [employer] .               Summary from office visit plan:   ASSESSMENT/PLAN                                                       (F11.20) Opioid use disorder, moderate, dependence (H)  (primary encounter diagnosis)  Comment: No use since 11/13/2019, no cravings with suboxone.  No side effects.   Plan: Continue buprenorphine HCl-naloxone HCl (SUBOXONE) 4-1 MG per film BID.  Continue to attend OP programming and be mindful of the sober living rules as they are temporary and meant to increase structure and support.      Follow-up: on 02/03/2020 due to insurance issues as our clinic does not accept Tube2Tone.  Patient switched to [employer] but this will not be active until 02/01/2020.  Patient aware that he is to call prior to running out of suboxone for a refill to bridge him until his appointment.

## 2020-01-23 NOTE — TELEPHONE ENCOUNTER
1- at 3:14 PM    Walk-in. Patient is at the clinic following up on this. Patient also updated his phone number with [facility] - updated info within EMR (electronic medical record) at this time and (per patient) removed the previous phone numbers as they are no longer current.    Phone # listed for patient was [phone] (the old #) and patient said when he called the clinic he left his new number, that he can be reached at. Requests to speak with Jonathan Paecock  Patient Representative  [facility]

## 2020-01-23 NOTE — TELEPHONE ENCOUNTER
RX was sent at 3:11 pm. Pharmacy received it. Patient was instructed to go to the pharmacy. Notified pharmacy patient is on his way and wants this filled ASAP.

## 2020-01-23 NOTE — TELEPHONE ENCOUNTER
Chart reviewed and will fill a 12 day supply of suboxone 4-1mg film BID dosing to last until Monday 2/3/2020 since insurance active on a weekend (2/1/2020).     E-rx sent at this time to [facility] pharmacy as I was not able to get a hold of patient via telephone to send to a pharmacy closer to sober living.

## 2020-01-23 NOTE — TELEPHONE ENCOUNTER
Reason for Call:  Medication    Detailed comments: Patient had called stating that Provider had told him he has a refill at the [facility] pharmacy but when the patient had tried to  the medication the pharmacy had told him that there are no refills and he would need to contact us.      Phone Number Patient can be reached at: [phone]    Best Time: Anytime    Can we leave a detailed message on this number? YES    Call taken on 1/23/2020 at 10:33 AM by Cris Cooley

## 2020-02-03 ENCOUNTER — TELEPHONE (OUTPATIENT)
Dept: ADDICTION MEDICINE | Facility: CLINIC | Age: 30
End: 2020-02-03

## 2020-02-03 ENCOUNTER — OFFICE VISIT (OUTPATIENT)
Dept: ADDICTION MEDICINE | Facility: CLINIC | Age: 30
End: 2020-02-03
Payer: COMMERCIAL

## 2020-02-03 VITALS
OXYGEN SATURATION: 99 % | DIASTOLIC BLOOD PRESSURE: 59 MMHG | HEART RATE: 63 BPM | SYSTOLIC BLOOD PRESSURE: 129 MMHG | WEIGHT: 160.5 LBS | BODY MASS INDEX: 26.71 KG/M2 | TEMPERATURE: 97.6 F

## 2020-02-03 DIAGNOSIS — Z79.899 HIGH RISK MEDICATION USE: ICD-10-CM

## 2020-02-03 DIAGNOSIS — F33.1 MODERATE EPISODE OF RECURRENT MAJOR DEPRESSIVE DISORDER (H): ICD-10-CM

## 2020-02-03 DIAGNOSIS — F11.20 OPIOID USE DISORDER, MODERATE, DEPENDENCE (H): Primary | ICD-10-CM

## 2020-02-03 DIAGNOSIS — F90.0 ADHD (ATTENTION DEFICIT HYPERACTIVITY DISORDER), INATTENTIVE TYPE: ICD-10-CM

## 2020-02-03 DIAGNOSIS — F17.200 TOBACCO USE DISORDER: ICD-10-CM

## 2020-02-03 PROCEDURE — 99214 OFFICE O/P EST MOD 30 MIN: CPT | Performed by: NURSE PRACTITIONER

## 2020-02-03 PROCEDURE — 80306 DRUG TEST PRSMV INSTRMNT: CPT | Performed by: NURSE PRACTITIONER

## 2020-02-03 RX ORDER — BUPRENORPHINE AND NALOXONE 4; 1 MG/1; MG/1
1 FILM, SOLUBLE BUCCAL; SUBLINGUAL 2 TIMES DAILY
Qty: 42 FILM | Refills: 0 | Status: SHIPPED | OUTPATIENT
Start: 2020-02-03 | End: 2020-02-24 | Stop reason: DRUGHIGH

## 2020-02-03 RX ORDER — BUPROPION HYDROCHLORIDE 450 MG/1
TABLET, FILM COATED, EXTENDED RELEASE ORAL
Qty: 30 TABLET | Refills: 0 | Status: SHIPPED | OUTPATIENT
Start: 2020-02-03 | End: 2020-02-24

## 2020-02-03 RX ORDER — NICOTINE 21 MG/24HR
1 PATCH, TRANSDERMAL 24 HOURS TRANSDERMAL EVERY 24 HOURS
Qty: 28 PATCH | Refills: 1 | Status: SHIPPED | OUTPATIENT
Start: 2020-02-03 | End: 2020-03-31

## 2020-02-03 NOTE — PROGRESS NOTES
SUBJECTIVE                                                    CC: Patient presents with:  Addiction Problem    BUPRENORPHINE FOLLOW UP: current dose Suboxone 4-1 mg BID.     Bebeto Alvarado is a 29 year old male who presents to clinic today for follow up of Buprenorphine, mood, and ADHD symptoms.      Date of last visit:  1/23/2020  No-shows/Late cancels: None    Primary Care Provider: NEEL Tariq CNP   [place] Board of Pharmacy Data Base Reviewed:    Yes; reviewed today and no concerns for diversionary activity.  Most recent data includes:  01/23/2020 1 01/23/2020 Suboxone 4 Mg-1 Mg Sl Film  #24.00 12 Kr Calli     Brief History:    Initial visit with me on 11/20/2019  Recreational use of mushrooms and marijuana as a teen.  At 18 started using oxycontin orally at parties then started smoking heroin at age 20. Around age [age] started using heroin daily to avoid withdrawal symptoms.  Detox and treatment at [facility] in 10/2018 for one week but asked to leave as he was accused of buying drugs on the campus.  Started using heroin IV in 06/2019 1/2 to 1 gram daily.  Detox admission 11/15/2019 -11/19/2019 and went to [facility] once a bed was available.  Graduated and transitioned to [facility] on 12/30/2019 (OP treatment with sober housing).      Tobacco use - reducing, 5 cigarettes daily and wants to quit with support from nicotine gum and nicotine pataches     Hepatitis C Status - Nonreactive 11/14/2019              HIV status - Nonreactive 11/14/2019     Date of last use:   Heroin 11/13/2019   THC - 11/2019 prior to [facility]      HPI:    2/3/2020  Patient is still in OP treatment with sober living at [facility].  He reports getting his own room last Tuesday.  States things are going well in recovery, one episode of severe cravings and ended up going to to NA meetings that day as well as discussing with a sponsor.  No relapse or use since last visit.  He graduates from practice  [employer] end of March or beginning of April and plans to get his own apartment.  He is attending [facility] regularly at [facility] which has been going well.  Socializing with old girlfriend and they are going to a recovery meeting together.  He is concerned this relationship may not go well.  Discussed focusing on his recovery.    At his last visit Wellbutrin was increased to  mg daily.  He reports this improved his mood and decrease depressive symptoms including low energy and apathy.  Continues to have issues with focus task completion and organization.  History of ADHD primarily inattentive type.  ADHD testing on the child.  Referral placed for ADHD testing patient aware no stimulant will be trialed unless testing completed.  Will increase Wellbutrin to 450 mg daily evaluate for improvement in concentration.    Tobacco use has decreased with Wellbutrin but still smoking a few cigarettes a day and would like refill on nicotine gum and patches.  He reports stressors include his mom was in the hospital for a few days due to suspected stroke.  He states her medical concerns likely related to illicit drug use which she has discussed with her recently. Helped with mood and reduced smoking.      Patient status since last visit: Stable    Cravings: 1 episode of cravings otherwise denies    MAT Dose: adequate    Side Effects: none.      Cues to use and relapse triggers: None known to patient    Recovery program has been: Actively attending outpatient CD treatment at [facility] with twice weekly recovery meetings and recovery Catholic.    Sobriety:     Status: no use since last visit.      Drug Screen: obtained.         Social History     Social History Narrative    Current living situation: prior to treatment was living with [place] mom [place] of [place] while waiting to get into treatment; prior to that was living with a friend but after getting into an argument with his roommate he ended up staying on other  "people's couches for 2 weeks before going into detox.  Now is living in [facility] house with two roommates.     Marital status: single, never     Children: none        Employment/financial status: Unemployed currently; dropped out of high school senior year - was smoking THC; no GED but wants to get in future; previous jobs include  for [email or web address] - lost job for using as he was \"slacking\" at work; he reports interest in working in customer service or sales in the future    Current recovery program (meetings, sponsorship): Graduated from [facility] - transitioned to [facility] living with OP treatment, attending NA meetings    Transportation: own car and license    Legal hx: on probation for 5th degree possession of a fake prescription for opioids that a \"friend\" talked him into trying to get filled at the pharmacy; 1 year of probation left; no pending court dates     Social History:   Reviewed - No changes since last visit.     Problem list and histories reviewed & adjusted, as indicated.  Additional history: as documented    Patient Active Problem List    Diagnosis Date Noted     History of heroin abuse (H) 12/31/2019     Priority: Medium     Opioid dependence, uncomplicated (H) 12/01/2019     Priority: Medium     Attention deficit disorder 12/15/2004     Priority: Medium     Epic       Cannabis abuse 12/15/2004     Priority: Medium     Epic         Current Medications:  buprenorphine HCl-naloxone HCl (SUBOXONE) 4-1 MG per film, Place 1 Film under the tongue 2 times daily  buPROPion (WELLBUTRIN XL) 300 MG 24 hr tablet, Take 1 tablet (300 mg) by mouth every morning  melatonin 3 MG tablet, Take 2 tablets (6 mg) by mouth At Bedtime  nicotine (NICODERM CQ) 14 MG/24HR 24 hr patch, Place 1 patch onto the skin every 24 hours  nicotine (NICODERM CQ) 21 MG/24HR 24 hr patch, Place 1 patch onto the skin every 24 hours  nicotine (NICORETTE) 2 MG gum, Place 1-2 each (2-4 mg) " inside cheek as needed for smoking cessation  nicotine (NICORETTE) 2 MG gum, Place 1-2 each (2-4 mg) inside cheek as needed for smoking cessation  traZODone (DESYREL) 50 MG tablet, Take 1-2 tablets ( mg) by mouth nightly as needed for sleep    No current facility-administered medications on file prior to visit.       Allergies   Allergen Reactions     Codeine Unknown     Pt has been told.         REVIEW OF SYSTEMS:  General:  No acute withdrawal symptoms.  No recent infections or fever  Eyes:  No vision concerns.  No double vision.    Resp: No coughing, wheezing or shortness of breath  CV: No chest pains or palpitations  GI: No nausea, vomiting, abdominal pain, diarrhea.  No constipation  : No urinary frequency or dysuria    Musculoskeletal: No significant muscle or joint pains other than as above.  No edema  Neurologic: No numbness, tingling, weakness, problems with balance or coordination  Psychiatric: No acute concerns other than as above.   Skin: No rashes or areas of acute infection      OBJECTIVE                                                    LABS:  Labs reviewed.  Urine tox results POS: buprenorphine today.     Results for orders placed or performed in visit on 02/03/20   Urine Drugs of Abuse Screen Panel 13     Status: Abnormal   Result Value Ref Range    Cannabinoids (92-asi-6-carboxy-9-THC) Not Detected NDET^Not Detected ng/mL    Phencyclidine (Phencyclidine) Not Detected NDET^Not Detected ng/mL    Cocaine (Benzoylecgonine) Not Detected NDET^Not Detected ng/mL    Methamphetamine (d-Methamphetamine) Not Detected NDET^Not Detected ng/mL    Opiates (Morphine) Not Detected NDET^Not Detected ng/mL    Amphetamine (d-Amphetamine) Not Detected NDET^Not Detected ng/mL    Benzodiazepines (Nordiazepam) Not Detected NDET^Not Detected ng/mL    Tricyclic Antidepressants (Desipramine) Not Detected NDET^Not Detected ng/mL    Methadone (Methadone) Not Detected NDET^Not Detected ng/mL    Barbiturates  (Butalbital) Not Detected NDET^Not Detected ng/mL    Oxycodone (Oxycodone) Not Detected NDET^Not Detected ng/mL    Propoxyphene (Norpropoxyphene) Not Detected NDET^Not Detected ng/mL    Buprenorphine (Buprenorphine) Detected, Abnormal Result (A) NDET^Not Detected ng/mL       PHYSICAL EXAM:  /59   Pulse 63   Temp 97.6  F (36.4  C) (Oral)   Wt 72.8 kg (160 lb 8 oz)   SpO2 99%   BMI 26.71 kg/m      GENERAL APPEARANCE:  alert, comfortable appearing  EYES:Eyes grossly normal to inspection  NEURO:  Gait normal.  No tremor. Coordination intact.     MENTAL STATUS EXAM:  Appearance:  awake, alert, well groomed, cooperative and no apparent distress  Attitude:  cooperative  Eye Contact:  good  Mood:  better  Affect:  appropriate and in normal range and mood congruent  Speech:  clear, coherent rate /rhythm are normal  Psychomotor Behavior:  no evidence of tardive dyskinesia, dystonia, or tics  Throught Process:  logical and linear  Associations:  no loose associations  Thought Content:  no evidence of suicidal ideation or homicidal ideation, no evidence of psychotic thought, no auditory hallucinations present and no visual hallucinations present  Insight:  good  Judgement:  intact  Oriented to:  time, person, and place  Attention Span and Concentration:  fair  Recent and Remote Memory:  intact  Language fund of knowledge are adequate      ASSESSMENT/PLAN                                                      (F11.20) Opioid use disorder, moderate, dependence (H)  (primary encounter diagnosis)  Comment: No use of opioids since last visit.  Denies side effects from Suboxone, Cravings stable  Plan: Continue buprenorphine HCl-naloxone HCl (SUBOXONE) 4-1         MG per film; Place 1 film under the tongue twice daily.  Continue outpatient treatment at [facility].    (F33.1) Moderate episode of recurrent major depressive disorder (H)  Comment: Mood improved with increase in Wellbutrin, still having difficulties with  focus with increased to  mg daily.  Plan: Increase Wellbutrin  mg tablets, take 1 tablet by mouth once daily.    By history (F90.0) ADHD (attention deficit hyperactivity disorder), inattentive type  Comment: ADHD testing as a child.  Mild improvement in focus with Wellbutrin but still having difficulty during meetings and treatment programming.  Not currently working.  Plan: Recommend ADHD testing and patient aware no stimulants will be trialed unless indicated by testing results.  Will trial increase Wellbutrin to 450 mg daily as above.    MENTAL HEALTH REFERRAL  - Adult; Assessments         and Testing; ADHD; [facility]: [facility] [phone]; We will contact you to         schedule the appointment or please call with         any questions, buPROPion 450 MG TB24    (I25.849) Tobacco use disorder  Comment: Less tobacco use with Wellbutrin.  Still smoking and would like to quit.  Requesting refill on nicotine replacement  Plan: nicotine (NICORETTE) 2 MG gum, pack 1 piece of gum every hour as needed for tobacco cessation.  nicotine (NICODERM CQ) 21 MG/24HR 24 hr patch Place 1 patch on the skin every day.    (R96.407) High risk medication use  Comment: Suboxone  Plan: Routine UDS, monitor for return to use and cravings        ENCOUNTER FOR LONG TERM USE OF HIGH RISK MEDICATION   High Risk Drug Monitoring?  YES   Drug being monitored: Buprenorphine   Reason for drug: Opioid use disorder   What is being monitored?: Dosage, Cravings, Trigger, side effects, and continued abstinence.      Follow-up: 3 weeks    Patient counseling completed today:  Counseled the patient on the importance of having a recovery program in addition to Suboxone maintenance.  Also discussed having a sober support network, not isolating, being open, honest, and willing to change, avoiding triggers and managing cravings.  In addition, abstinence from alcohol, benzodiazepines, THC, opioids and other drugs of abuse  was recommended.  Risk of overdose following a period of abstinence due to decrease tolerance was discussed including risk of death.  Risk of overdose if using Suboxone with other substances particuarly benzodiazepines, alcohol, gabapentin, or other CNS depressants was reviewed.      Jasmin Coppola CNP  [facility] - Addiction Medicine  [facility]  [phone]

## 2020-02-03 NOTE — TELEPHONE ENCOUNTER
Reasons for call:  Medication clarification and Order Requests    Day (an RN with [facility], where patient is currently staying) states patient was just seen at this clinic and noticed that he was sent home 4 bottles of Suboxone - 3 sealed, 1 opened, none of them showed his name on bottles. Day requests some clarification regarding these and noted that the order she was given is for films but the meds patient was sent with is the dissolvable tablets. Requests a return call.    Also, states patient was also supposed to come back with order to discontinue melatonin. Requests an order for that.    Order requested: to start Buprenorphine HCl-naloxone HCl (Suboxone) 4-1 mg per film  Reason for request: order for Day to give patient Suboxone meds  Date needed: as soon as possible  Has the patient been seen by the PCP for this problem? YES      Order requested: to discontinue melatonin     Phone number to reach Day (RN at [facility]):  [phone]    Best Time:  none    Can we leave a detailed message on this number?  YES      Dominga Peacock  Patient Representative  [facility]

## 2020-02-03 NOTE — TELEPHONE ENCOUNTER
Returned phone call to ORACIO Bernstein, at [facility]. Informed Day that Suboxone tablets were filled instead of films due to insurance coverage. This nurse is unable to clarify why Tavo has multiple bottles of Suboxone without his name on it; encouraged nurse to clarify w/Tavo.    Medication orders faxed to Day per request at [phone].    Veena Tadeo RN on 2/3/2020 at 2:29 PM

## 2020-02-03 NOTE — PATIENT INSTRUCTIONS
Call to scheduled ADHD testing [phone]    We will increase your wellbutrin XL to 450 mg daily starting tomorrow.  I also refilled your nicotine gum and patches to help you stop smoking.    Stop taking melatonin.

## 2020-02-04 NOTE — PROGRESS NOTES
"  3  SUBJECTIVE:   CC: Bebeto Alvarado is an 29 year old male who presents for preventive health visit.     Healthy Habits:    Do you get at least three servings of calcium containing foods daily (dairy, green leafy vegetables, etc.)? yes    Amount of exercise or daily activities, outside of work: 4 day(s) per week    Problems taking medications regularly No    Medication side effects: No    Have you had an eye exam in the past two years? no    Do you see a dentist twice per year? no    Do you have sleep apnea, excessive snoring or daytime drowsiness?yes      Musculoskeletal problem/pain      Duration: few days      Description  Location: rib pain     Intensity:  moderate    Accompanying signs and symptoms: none    History  Previous similar problem: no   Previous evaluation:  none    Precipitating or alleviating factors:  Trauma or overuse: YES- work outs   Aggravating factors include: when touching     Therapies tried and outcome: nothing  Did do a lot of obliques and pain started the next day or two  No asthma or heart history, admits has been a while since he did this much at the gym    Still smoking, trying to cut down, to 10 cigs per day now, using gum but not his patches. Not quite ready to go for it but will think about it once over the hump of recovery, continues on suboxone and feels well overall. They recently increased his anxiety med tolerating so far. No other concerns  Had new partner used \"partial protection\", female partner didn't know history well, wishes to do future blood draw to also do fasting lab work, last lipid panel actually looked good and last HIV negative       Today's PHQ-2 Score:   PHQ-2 ( 1999 [employer]) 12/31/2019   Q1: Little interest or pleasure in doing things 0   Q2: Feeling down, depressed or hopeless 0   PHQ-2 Score 0       Abuse: Current or Past(Physical, Sexual or Emotional)- No  Do you feel safe in your environment? Yes        Social History     Tobacco Use     Smoking " status: Current Every Day Smoker     Packs/day: 0.50     Types: Cigarettes     Smokeless tobacco: Never Used   Substance Use Topics     Alcohol use: Not Currently     If you drink alcohol do you typically have >3 drinks per day or >7 drinks per week? No                      Last PSA: No results found for: PSA    Reviewed orders with patient. Reviewed health maintenance and updated orders accordingly - Yes  Lab work is in process  Labs reviewed in EPIC  BP Readings from Last 3 Encounters:   02/06/20 130/80   02/03/20 129/59   01/09/20 120/66    Wt Readings from Last 3 Encounters:   02/06/20 72.6 kg (160 lb)   02/03/20 72.8 kg (160 lb 8 oz)   01/09/20 72.6 kg (160 lb)                  Patient Active Problem List   Diagnosis     Attention deficit disorder     Cannabis abuse     Opioid dependence, uncomplicated (H)     History of heroin abuse (H)     No past surgical history on file.    Social History     Tobacco Use     Smoking status: Current Every Day Smoker     Packs/day: 0.50     Types: Cigarettes     Smokeless tobacco: Never Used   Substance Use Topics     Alcohol use: Not Currently     Family History   Problem Relation Age of Onset     Diabetes Mother      Hypertension Mother      No Known Problems Father      No Known Problems Brother      Suicide Other          Current Outpatient Medications   Medication Sig Dispense Refill     buprenorphine HCl-naloxone HCl (SUBOXONE) 4-1 MG per film Place 1 Film under the tongue 2 times daily 42 Film 0     buPROPion 450 MG TB24 Take 1 tablet (450mg) by mouth every morning. 30 tablet 0     nicotine (NICODERM CQ) 21 MG/24HR 24 hr patch Place 1 patch onto the skin every 24 hours 28 patch 1     nicotine (NICORETTE) 2 MG gum Place 1-2 each (2-4 mg) inside cheek as needed for smoking cessation 110 tablet 4     traZODone (DESYREL) 50 MG tablet Take 1-2 tablets ( mg) by mouth nightly as needed for sleep 60 tablet 1     Allergies   Allergen Reactions     Codeine Unknown     Pt  has been told.       Reviewed and updated as needed this visit by clinical staff  Tobacco  Allergies  Meds         Reviewed and updated as needed this visit by Provider        Past Medical History:   Diagnosis Date     Opiate abuse, continuous (H)      Tobacco use       No past surgical history on file.    ROS:  CONSTITUTIONAL: NEGATIVE for fever, chills, change in weight  INTEGUMENTARY/SKIN: NEGATIVE for worrisome rashes, moles or lesions  EYES: NEGATIVE for vision changes or irritation  ENT: NEGATIVE for ear, mouth and throat problems  RESP: NEGATIVE for significant cough or SOB  CV: NEGATIVE for chest pain, palpitations or peripheral edema  GI: NEGATIVE for nausea, abdominal pain, heartburn, or change in bowel habits   male: negative for dysuria, hematuria, decreased urinary stream, erectile dysfunction, urethral discharge  MUSCULOSKELETAL: hpi otherwise NEGATIVE for significant arthralgias or myalgia  NEURO: NEGATIVE for weakness, dizziness or paresthesias  PSYCHIATRIC: NEGATIVE for changes in mood or affect    OBJECTIVE:   /80   Pulse 98   Wt 72.6 kg (160 lb)   SpO2 98%   BMI 26.63 kg/m    EXAM:  GENERAL: healthy, alert and no distress  EYES: Eyes grossly normal to inspection, PERRL and conjunctivae and sclerae normal  HENT: ear canals and TM's normal, nose and mouth without ulcers or lesions  NECK: no adenopathy, no asymmetry, masses, or scars and thyroid normal to palpation  RESP: lungs clear to auscultation - no rales, rhonchi or wheezes  CV: regular rate and rhythm, normal S1 S2, no S3 or S4, no murmur, click or rub, no peripheral edema and peripheral pulses strong  ABDOMEN: soft, nontender, no hepatosplenomegaly, no masses and bowel sounds normal   (male): normal male genitalia without lesions or urethral discharge, no hernia  MS: no gross musculoskeletal defects noted, no edema, mild pain between ribs on palpation, rom normal, no bruises   SKIN: no suspicious lesions or rashes  NEURO:  "Normal strength and tone, mentation intact and speech normal  PSYCH: mentation appears normal, affect normal/bright    Diagnostic Test Results:  Labs reviewed in Epic  No results found for this or any previous visit (from the past 24 hour(s)).    ASSESSMENT/PLAN:       ICD-10-CM    1. Routine general medical examination at a health care facility Z00.00    2. Screen for STD (sexually transmitted disease) Z11.3 HIV Antigen Antibody Combo     Treponema Abs w Reflex to RPR and Titer     NEISSERIA GONORRHOEA PCR     CHLAMYDIA TRACHOMATIS PCR   3. Lipid screening Z13.220 Lipid panel reflex to direct LDL Fasting   4. Screening for diabetes mellitus Z13.1 Glucose   5. Abdominal muscle pain M79.18 OFFICE/OUTPT VISIT,EST,LEVL III   6. Cigarette smoker F17.210 OFFICE/OUTPT VISIT,EST,LEVL III   abd pain after doing a lot of core exercises as the cause, counseled on heat and gentle stretches, follow up if not resolving or new symptoms     Continue suboxone and doing well with sobriety, following with Psych. Continue to work on good self care and he'll consider quitplan, reviewed smoking cessation options    COUNSELING:  Reviewed preventive health counseling, as reflected in patient instructions       Regular exercise       Healthy diet/nutrition       Vision screening       Immunizations    Vaccinated for: Hepatitis A and Declined: Influenza and Pneumococcal due to \"just doesn't want, would rather take my chances\"           Safe sex practices/STD prevention       HIV screeninx in teen years, 1x in adult years, and at intervals if high risk       One time pneumovax for smokers    Estimated body mass index is 26.63 kg/m  as calculated from the following:    Height as of 20: 1.651 m (5' 5\").    Weight as of this encounter: 72.6 kg (160 lb).         reports that he has been smoking cigarettes. He has been smoking about 0.50 packs per day. He has never used smokeless tobacco.  Tobacco Cessation Action Plan: Information " offered: Patient not interested at this time    Counseling Resources:  ATP IV Guidelines  Pooled Cohorts Equation Calculator  FRAX Risk Assessment  ICSI Preventive Guidelines  Dietary Guidelines for Americans, 2010  USDA's MyPlate  ASA Prophylaxis  Lung CA Screening    In addition to the physical, I spent 15 minutes of face to face time with Bebeto Alvarado of which more than 50% was spent on mild abd strains and advancing exercise as tolerated, sobriety and anxiety, smoking       NEEL Tariq CNP  [facility] [place]

## 2020-02-06 ENCOUNTER — OFFICE VISIT (OUTPATIENT)
Dept: FAMILY MEDICINE | Facility: CLINIC | Age: 30
End: 2020-02-06
Payer: COMMERCIAL

## 2020-02-06 VITALS
BODY MASS INDEX: 26.63 KG/M2 | OXYGEN SATURATION: 98 % | HEART RATE: 98 BPM | SYSTOLIC BLOOD PRESSURE: 130 MMHG | WEIGHT: 160 LBS | DIASTOLIC BLOOD PRESSURE: 80 MMHG

## 2020-02-06 DIAGNOSIS — Z11.3 SCREEN FOR STD (SEXUALLY TRANSMITTED DISEASE): ICD-10-CM

## 2020-02-06 DIAGNOSIS — Z13.220 LIPID SCREENING: ICD-10-CM

## 2020-02-06 DIAGNOSIS — M79.18 ABDOMINAL MUSCLE PAIN: ICD-10-CM

## 2020-02-06 DIAGNOSIS — Z00.00 ROUTINE GENERAL MEDICAL EXAMINATION AT A HEALTH CARE FACILITY: Primary | ICD-10-CM

## 2020-02-06 DIAGNOSIS — Z13.1 SCREENING FOR DIABETES MELLITUS: ICD-10-CM

## 2020-02-06 DIAGNOSIS — F17.210 CIGARETTE SMOKER: ICD-10-CM

## 2020-02-06 PROCEDURE — 90632 HEPA VACCINE ADULT IM: CPT | Performed by: NURSE PRACTITIONER

## 2020-02-06 PROCEDURE — 87491 CHLMYD TRACH DNA AMP PROBE: CPT | Performed by: NURSE PRACTITIONER

## 2020-02-06 PROCEDURE — 87591 N.GONORRHOEAE DNA AMP PROB: CPT | Performed by: NURSE PRACTITIONER

## 2020-02-06 PROCEDURE — 99213 OFFICE O/P EST LOW 20 MIN: CPT | Mod: 25 | Performed by: NURSE PRACTITIONER

## 2020-02-06 PROCEDURE — 99395 PREV VISIT EST AGE 18-39: CPT | Mod: 25 | Performed by: NURSE PRACTITIONER

## 2020-02-06 PROCEDURE — 90471 IMMUNIZATION ADMIN: CPT | Performed by: NURSE PRACTITIONER

## 2020-02-07 ENCOUNTER — TELEPHONE (OUTPATIENT)
Dept: FAMILY MEDICINE | Facility: CLINIC | Age: 30
End: 2020-02-07

## 2020-02-07 DIAGNOSIS — A74.9 CHLAMYDIAL INFECTION: Primary | ICD-10-CM

## 2020-02-07 DIAGNOSIS — A74.9 CHLAMYDIA: Primary | ICD-10-CM

## 2020-02-07 LAB
C TRACH DNA SPEC QL NAA+PROBE: POSITIVE
N GONORRHOEA DNA SPEC QL NAA+PROBE: NEGATIVE
SPECIMEN SOURCE: ABNORMAL
SPECIMEN SOURCE: NORMAL

## 2020-02-07 RX ORDER — AZITHROMYCIN 500 MG/1
1000 TABLET, FILM COATED ORAL DAILY
Qty: 2 TABLET | Refills: 0 | Status: CANCELLED | OUTPATIENT
Start: 2020-02-07 | End: 2020-02-08

## 2020-02-07 RX ORDER — AZITHROMYCIN 500 MG/1
1000 TABLET, FILM COATED ORAL DAILY
Qty: 2 TABLET | Refills: 0 | Status: SHIPPED | OUTPATIENT
Start: 2020-02-07 | End: 2020-03-09

## 2020-02-07 NOTE — TELEPHONE ENCOUNTER
Called patient and notified him of positive test results and treatment. Filled out [facility] form and faxed it over. Bettina Helms RN on 2/7/2020 at 3:11 PM

## 2020-02-07 NOTE — TELEPHONE ENCOUNTER
Please call patient  Has   Orders Placed This Encounter     azithromycin (ZITHROMAX) 500 MG tablet     Sig: Take 2 tablets (1,000 mg) by mouth daily for 1 day     Dispense:  2 tablet     Refill:  0     Treat with   Orders Placed This Encounter     azithromycin (ZITHROMAX) 500 MG tablet     Sig: Take 2 tablets (1,000 mg) by mouth daily for 1 day     Dispense:  2 tablet     Refill:  0     And let him know about mandated state reporting

## 2020-02-08 NOTE — TELEPHONE ENCOUNTER
Please call pt about positive chlamydia result,     please fill out [facility] form and complete reporting process    Also ask which pharmacy please    Thanks  Kianna SHAIKH CNP

## 2020-02-12 ENCOUNTER — HOSPITAL ENCOUNTER (EMERGENCY)
Facility: CLINIC | Age: 30
Discharge: HOME | End: 2020-02-12
Attending: EMERGENCY MEDICINE | Admitting: EMERGENCY MEDICINE
Payer: COMMERCIAL

## 2020-02-12 VITALS
TEMPERATURE: 98.3 F | RESPIRATION RATE: 18 BRPM | HEART RATE: 88 BPM | HEIGHT: 67 IN | WEIGHT: 160 LBS | BODY MASS INDEX: 25.11 KG/M2 | DIASTOLIC BLOOD PRESSURE: 52 MMHG | SYSTOLIC BLOOD PRESSURE: 138 MMHG | OXYGEN SATURATION: 100 %

## 2020-02-12 DIAGNOSIS — Z92.29 HISTORY OF OPIATE THERAPY: ICD-10-CM

## 2020-02-12 DIAGNOSIS — R40.4 EPISODE OF UNRESPONSIVENESS: ICD-10-CM

## 2020-02-12 PROCEDURE — 99283 EMERGENCY DEPT VISIT LOW MDM: CPT | Mod: Z6 | Performed by: EMERGENCY MEDICINE

## 2020-02-12 PROCEDURE — 99283 EMERGENCY DEPT VISIT LOW MDM: CPT | Performed by: EMERGENCY MEDICINE

## 2020-02-12 ASSESSMENT — ENCOUNTER SYMPTOMS
SHORTNESS OF BREATH: 0
NECK STIFFNESS: 0
ARTHRALGIAS: 0
FEVER: 0
DIFFICULTY URINATING: 0
CONFUSION: 0
COLOR CHANGE: 0
EYE REDNESS: 0
ABDOMINAL PAIN: 0
HEADACHES: 0

## 2020-02-12 ASSESSMENT — MIFFLIN-ST. JEOR: SCORE: 1644.39

## 2020-02-12 NOTE — ED AVS SNAPSHOT
[facility], [place], Emergency Department  [address]  [facility] [address]  Phone:  [phone]  Fax:  [phone]                                    Bebeto Alvarado   MRN: [id number]    Department:  [facility], Emergency Department   Date of Visit:  2/12/2020           After Visit Summary Signature Page    I have received my discharge instructions, and my questions have been answered. I have discussed any challenges I see with this plan with the nurse or doctor.    ..........................................................................................................................................  Patient/Patient Representative Signature      ..........................................................................................................................................  Patient Representative Print Name and Relationship to Patient    ..................................................               ................................................  Date                                   Time    ..........................................................................................................................................  Reviewed by Signature/Title    ...................................................              ..............................................  Date                                               Time          22EPIC Rev 08/18

## 2020-02-13 NOTE — ED NOTES
Bed: [facility]  Expected date:   Expected time:   Means of arrival:   Comments:  kleber 729  29y M  Opiate overdose, narcan given. alert

## 2020-02-13 NOTE — ED NOTES
Patient informed of search and asked to change into scrubs.  Patient agreeable to search declined wearing scrubs.

## 2020-02-13 NOTE — DISCHARGE INSTRUCTIONS
You should continue to follow up with your addiction medicine specialist and suboxone therapy.  Return to the ED with any addiction concerns, or any medical complaints.

## 2020-02-13 NOTE — ED PROVIDER NOTES
[facility] EMERGENCY DEPARTMENT ([facility])    2/12/20        History     Chief Complaint   Patient presents with     Addiction Problem     per EMS, pt was found unresponsive on street by PD,EMS gave 0.5 mg of Narcan intranasal given, pt woke up, vital signs stable post Narcan.     The history is provided by the patient and medical records.     Bebeto Alvarado is a 29 year old male with a past medical history significant for depression, ADHD and substance abuse who presents here to the Emergency Department via EMS after being found unresponsive in his car. Patient reports that he is currently on trazodone, Wellbutrin and Suboxone. States that he was started on Suboxone 2 months ago and believes it has been working. States that this does make him sleepy. Reports that he was driving earlier today when he pulled over to the side of the road because he was tired. Reports that the next thing he knows the  had found him. Patient was given 0.5 mg of Narcan intranasally via EMS. Reports that currently he feels great. Denies chest pain or shortness of breath. Reports that he got new glasses today and has been getting used to them, causing a headache. States that he is legally blind in the left eye. Notes that he has Narcan accessible in his car.    I have reviewed the Medications, Allergies, Past Medical and Surgical History, and Social History in the [employer] system.    Past Medical History:   Diagnosis Date     Opiate abuse, continuous (H)      Tobacco use        History reviewed. No pertinent surgical history.    Family History   Problem Relation Age of Onset     Diabetes Mother      Hypertension Mother      No Known Problems Father      No Known Problems Brother      Suicide Other        Social History     Tobacco Use     Smoking status: Current Every Day Smoker     Packs/day: 1.00     Types: Cigarettes     Smokeless tobacco: Never Used   Substance Use Topics     Alcohol use: Not Currently       No  "current facility-administered medications for this encounter.      Current Outpatient Medications   Medication     buprenorphine HCl-naloxone HCl (SUBOXONE) 4-1 MG per film     buPROPion 450 MG TB24     nicotine (NICODERM CQ) 21 MG/24HR 24 hr patch     nicotine (NICORETTE) 2 MG gum     traZODone (DESYREL) 50 MG tablet        Allergies   Allergen Reactions     Codeine Unknown     Pt has been told.         Review of Systems   Constitutional: Negative for fever.   HENT: Negative for congestion.    Eyes: Negative for redness.   Respiratory: Negative for shortness of breath.    Cardiovascular: Negative for chest pain.   Gastrointestinal: Negative for abdominal pain.   Genitourinary: Negative for difficulty urinating.   Musculoskeletal: Negative for arthralgias and neck stiffness.   Skin: Negative for color change.   Neurological: Positive for syncope. Negative for headaches.   Psychiatric/Behavioral: Negative for confusion.       Physical Exam   BP: (!) 142/71  Pulse: 100  Temp: 98.3  F (36.8  C)  Resp: 18  Height: 170.2 cm (5' 7\")  Weight: 72.6 kg (160 lb)  SpO2: 100 %      Physical Exam  Constitutional:       General: He is not in acute distress.     Appearance: Normal appearance. He is toxic-appearing. He is not ill-appearing or diaphoretic.   HENT:      Head: Normocephalic and atraumatic.   Eyes:      General: No scleral icterus.     Pupils: Pupils are equal, round, and reactive to light.   Neck:      Musculoskeletal: Normal range of motion. No neck rigidity.   Cardiovascular:      Rate and Rhythm: Normal rate and regular rhythm.      Heart sounds: Normal heart sounds.   Pulmonary:      Effort: No respiratory distress.      Breath sounds: Normal breath sounds.   Abdominal:      General: Bowel sounds are normal.      Palpations: Abdomen is soft.      Tenderness: There is no abdominal tenderness.   Musculoskeletal:         General: No tenderness.   Skin:     General: Skin is warm.      Capillary Refill: Capillary " refill takes less than 2 seconds.      Findings: No rash.   Neurological:      General: No focal deficit present.      Mental Status: He is oriented to person, place, and time.      Motor: No weakness.      Gait: Gait normal.   Psychiatric:         Mood and Affect: Mood normal.         Behavior: Behavior normal.         Thought Content: Thought content normal.         Judgment: Judgment normal.         ED Course   8:16 PM  The patient was seen and examined by Leon Balderas DO in Room HW08.         No results found for this or any previous visit (from the past 24 hour(s)).    Medications - No data to display    Procedures       Labs Ordered and Resulted from Time of ED Arrival Up to the Time of Departure from the ED - No data to display         Assessments & Plan (with Medical Decision Making)   Patient presents after he was apparently found unresponsive in his car.  Was given 0.5 mg Narcan on scene.    Arrival to the emergency department, patient is nondistressed.  Vital signs are normal.  He is alert and oriented and ambulatory.  He states that he was just feeling tired, pulled over to the side of the road for a quick nap.  He is currently on Suboxone and trazodone.  Physical exam is unremarkable.    Differential includes opiate overdose, medication induced fatigue, less likely syncope, ACS, infectious process.    Recommended ordering some basic labs and EKG to rule out infectious/metabolic/cardiac cause of patient's unresponsiveness.  Patient refusing at this time, requesting to be discharged.  I believe this is reasonable.  Patient is alert and oriented.  He is ambulatory.  He has no physical complaints at this time.  It is possible that he used opiates prior to the unresponsive episode, however, I cannot confirm this.  He does have access to Narcan.  He will continue to follow-up in the Suboxone clinic.    I have reviewed the nursing notes.    I have reviewed the findings, diagnosis, plan and need for follow up  with the patient.    Discharge Medication List as of 2/12/2020  8:45 PM          Final diagnoses:   History of opiate therapy   Episode of unresponsiveness     IMorris, am serving as a trained medical scribe to document services personally performed by Leon Balderas DO, based on the provider's statements to me.   Leon ANDRES DO, was physically present and have reviewed and verified the accuracy of this note documented by Morris Tucker.    2/12/2020   [facility], [place], EMERGENCY DEPARTMENT     Leon Balderas DO  02/12/20 2515

## 2020-02-24 ENCOUNTER — OFFICE VISIT (OUTPATIENT)
Dept: ADDICTION MEDICINE | Facility: CLINIC | Age: 30
End: 2020-02-24
Payer: COMMERCIAL

## 2020-02-24 VITALS
BODY MASS INDEX: 24.82 KG/M2 | OXYGEN SATURATION: 98 % | SYSTOLIC BLOOD PRESSURE: 137 MMHG | DIASTOLIC BLOOD PRESSURE: 65 MMHG | RESPIRATION RATE: 14 BRPM | HEART RATE: 80 BPM | TEMPERATURE: 97.8 F | WEIGHT: 158.5 LBS

## 2020-02-24 DIAGNOSIS — Z79.899 HIGH RISK MEDICATION USE: ICD-10-CM

## 2020-02-24 DIAGNOSIS — F33.1 MODERATE EPISODE OF RECURRENT MAJOR DEPRESSIVE DISORDER (H): ICD-10-CM

## 2020-02-24 DIAGNOSIS — F11.20 OPIOID USE DISORDER, MODERATE, DEPENDENCE (H): Primary | ICD-10-CM

## 2020-02-24 DIAGNOSIS — F17.200 TOBACCO USE DISORDER: ICD-10-CM

## 2020-02-24 DIAGNOSIS — F90.0 ADHD (ATTENTION DEFICIT HYPERACTIVITY DISORDER), INATTENTIVE TYPE: ICD-10-CM

## 2020-02-24 LAB
AMPHETAMINES UR QL: NOT DETECTED NG/ML
BARBITURATES UR QL SCN: NOT DETECTED NG/ML
BENZODIAZ UR QL SCN: NOT DETECTED NG/ML
BUPRENORPHINE UR QL: ABNORMAL NG/ML
CANNABINOIDS UR QL: NOT DETECTED NG/ML
COCAINE UR QL SCN: NOT DETECTED NG/ML
D-METHAMPHET UR QL: NOT DETECTED NG/ML
METHADONE UR QL SCN: NOT DETECTED NG/ML
OPIATES UR QL SCN: ABNORMAL NG/ML
OXYCODONE UR QL SCN: NOT DETECTED NG/ML
PCP UR QL SCN: NOT DETECTED NG/ML
PROPOXYPH UR QL: NOT DETECTED NG/ML
TRICYCLICS UR QL SCN: NOT DETECTED NG/ML

## 2020-02-24 PROCEDURE — 99215 OFFICE O/P EST HI 40 MIN: CPT | Performed by: NURSE PRACTITIONER

## 2020-02-24 PROCEDURE — 80306 DRUG TEST PRSMV INSTRMNT: CPT | Performed by: NURSE PRACTITIONER

## 2020-02-24 RX ORDER — BUPRENORPHINE HYDROCHLORIDE AND NALOXONE HYDROCHLORIDE DIHYDRATE 2; .5 MG/1; MG/1
2 TABLET SUBLINGUAL 2 TIMES DAILY
Qty: 56 TABLET | Refills: 0 | Status: SHIPPED | OUTPATIENT
Start: 2020-02-24 | End: 2020-03-09

## 2020-02-24 RX ORDER — BUPROPION HYDROCHLORIDE 450 MG/1
450 TABLET, FILM COATED, EXTENDED RELEASE ORAL DAILY
Qty: 30 TABLET | Refills: 0 | Status: SHIPPED | OUTPATIENT
Start: 2020-02-24 | End: 2020-03-09

## 2020-02-24 NOTE — PROGRESS NOTES
SUBJECTIVE                                                    CC: Patient presents with:  Addiction Problem    BUPRENORPHINE FOLLOW UP: current dose Suboxone 2-0.5 mg SL tablets, take 2 tablets by mouth twice daily.     Bebeto Alvarado is a 29 year old male who presents to clinic today for follow up of Buprenorphine, mood, and ADHD symptoms.      Date of last visit:  1/23/2020  No-shows/Late cancels: None    Primary Care Provider: NEEL Tariq CNP   [place] Board of Pharmacy Data Base Reviewed:    Yes; reviewed today and no concerns for diversionary activity.  Most recent data includes:  02/03/2020 1 02/03/2020 Buprenorphn-Naloxn 2-0.5 Mg Sl #84.00 21 [address]i    Brief History:    Initial visit with me on 11/20/2019  Recreational use of mushrooms and marijuana as a teen.  At 18 started using oxycontin orally at parties then started smoking heroin at age 20. Around age [age] started using heroin daily to avoid withdrawal symptoms.  Detox and treatment at [facility] in 10/2018 for one week but asked to leave as he was accused of buying drugs on the campus.  Started using heroin IV in 06/2019 1/2 to 1 gram daily.  Detox admission 11/15/2019 -11/19/2019 and went to [facility] once a bed was available.  Graduated and transitioned to [facility] on 12/30/2019 (OP treatment with sober housing).      Tobacco use - reducing, 5 cigarettes daily and wants to quit with support from nicotine gum and nicotine pataches     Hepatitis C Status - Nonreactive 11/14/2019              HIV status - Nonreactive 11/14/2019     Date of last use:   Heroin 02/21/2020  THC - 11/2019 prior to [facility]    HPI:  2/24/2020  ED visit on 2/21/2020 after being found unresponsive in his car after relapsing on heroin and was given narcan by EMS and evaluated in the ED.  He reports one other episode of use on 2/21/2020 and states he found the heroin in his car.  He is embarrassed and ashamed of his relapse.  He continued to  take his suboxone daily as prescribed and is still hopeful about recovery.  He is being kicked out of his sober housing after recent relapse.  He is working on finding a place to go.     He states he doesn't have a clear trigger or warning sign for his return to use.  He does report feeling isolated and lonely Friday 2/21/2020 before his most recent relapse and stated no one was at the house for him to talk to.  He hasn't started working the steps with his sponsor yet but was encouraged to do this and reach out more frequently to his sponsor.  He has #4 tablets left of his suboxone.  His insurance doesn't cover films.  He reports getting extra suboxone tablets from the pharmacy at his last suboxone fill which was verified by his counselor at [facility].     He has been in touch with [facility] about transferring there for continued sober living and OP CD treatment.  A letter of support was provided today to advocate for the patient to remain in [facility] until he can be moved to [facility].  His mood has been low with the recent relapse but reports otherwise Wellbutrin  mg has been helpful.  Still having some issues with focus and impulsivity - has not scheduled ADHD testing yet but has the number to call and schedule this. Denies SI and SIB.        Patient status since last visit: Stable    Cravings: Cravings the last week at various times     MAT Dose: adequate    Side Effects: none.      Cues to use and relapse triggers: Loneliness     Recovery program has been: Actively attending outpatient CD treatment at [facility] with twice weekly recovery meetings and recovery Judaism.  Has a sponsor but doesn't connect with them often     Sobriety:     Status: two episodes of heroin use since last visit.     Drug Screen: obtained.         Social History     Social History Narrative    Current living situation: prior to treatment was living with his mom [place] while  "waiting to get into treatment; prior to that was living with a friend but after getting into an argument with his roommate he ended up staying on other people's couches for 2 weeks before going into detox.  Now is living in [facility] house with two roommates.     Marital status: single, never     Children: none        Employment/financial status: Unemployed currently; dropped out of high school senior year - was smoking THC; no GED but wants to get in future; previous jobs include  for [employer] - lost job for using as he was \"slacking\" at work; he reports interest in working in customer service or sales in the future    Current recovery program (meetings, sponsorship): Graduated from [facility] - transitioned to [facility] living with OP treatment, attending NA meetings    Transportation: own car and license    Legal hx: on probation for 5th degree possession of a fake prescription for opioids that a \"friend\" talked him into trying to get filled at the pharmacy; 1 year of probation left; no pending court dates     Social History:   Reviewed - See HPI for changes since last visit.     Problem list and histories reviewed & adjusted, as indicated.  Additional history: as documented    Patient Active Problem List    Diagnosis Date Noted     History of heroin abuse (H) 2019     Priority: Medium     Opioid dependence, uncomplicated (H) 2019     Priority: Medium     Attention deficit disorder 12/15/2004     Priority: Medium     Epic       Cannabis abuse 12/15/2004     Priority: Medium     Epic         Current Medications:  [] azithromycin (ZITHROMAX) 500 MG tablet, Take 2 tablets (1,000 mg) by mouth daily for 1 day  buprenorphine HCl-naloxone HCl (SUBOXONE) 4-1 MG per film, Place 1 Film under the tongue 2 times daily  buPROPion 450 MG TB24, Take 1 tablet (450mg) by mouth every morning.  nicotine (NICODERM CQ) 21 MG/24HR 24 hr patch, Place 1 patch " onto the skin every 24 hours  nicotine (NICORETTE) 2 MG gum, Place 1-2 each (2-4 mg) inside cheek as needed for smoking cessation  traZODone (DESYREL) 50 MG tablet, Take 1-2 tablets ( mg) by mouth nightly as needed for sleep    No current facility-administered medications on file prior to visit.       Allergies   Allergen Reactions     Codeine Unknown     Pt has been told.         REVIEW OF SYSTEMS:  General:  No acute withdrawal symptoms.  No recent infections or fever  Eyes:  +++new glasses; No vision concerns.  No double vision.    Resp: No coughing, wheezing or shortness of breath  CV: No chest pains or palpitations  GI: No nausea, vomiting, abdominal pain, diarrhea.  No constipation  : No urinary frequency or dysuria    Musculoskeletal: No significant muscle or joint pains other than as above.  No edema  Neurologic: No numbness, tingling, weakness, problems with balance or coordination  Psychiatric: +++tearful and anxious; No acute concerns other than as above.   Skin: No rashes or areas of acute infection      OBJECTIVE                                                    LABS:  Labs reviewed.  Urine tox results POS: opiates and buprenorphine today.     Results for orders placed or performed in visit on 02/24/20   Urine Drugs of Abuse Screen Panel 13     Status: Abnormal   Result Value Ref Range    Cannabinoids (68-wat-9-carboxy-9-THC) Not Detected NDET^Not Detected ng/mL    Phencyclidine (Phencyclidine) Not Detected NDET^Not Detected ng/mL    Cocaine (Benzoylecgonine) Not Detected NDET^Not Detected ng/mL    Methamphetamine (d-Methamphetamine) Not Detected NDET^Not Detected ng/mL    Opiates (Morphine) Detected, Abnormal Result (A) NDET^Not Detected ng/mL    Amphetamine (d-Amphetamine) Not Detected NDET^Not Detected ng/mL    Benzodiazepines (Nordiazepam) Not Detected NDET^Not Detected ng/mL    Tricyclic Antidepressants (Desipramine) Not Detected NDET^Not Detected ng/mL    Methadone (Methadone) Not  Detected NDET^Not Detected ng/mL    Barbiturates (Butalbital) Not Detected NDET^Not Detected ng/mL    Oxycodone (Oxycodone) Not Detected NDET^Not Detected ng/mL    Propoxyphene (Norpropoxyphene) Not Detected NDET^Not Detected ng/mL    Buprenorphine (Buprenorphine) Detected, Abnormal Result (A) NDET^Not Detected ng/mL       PHYSICAL EXAM:  /65   Pulse 80   Temp 97.8  F (36.6  C) (Oral)   Resp 14   Wt 71.9 kg (158 lb 8 oz)   SpO2 98%   BMI 24.82 kg/m      GENERAL APPEARANCE:  alert, comfortable appearing  EYES:Eyes grossly normal to inspection  NEURO:  Gait normal.  No tremor. Coordination intact.     MENTAL STATUS EXAM:  Appearance:  awake, alert, well groomed, cooperative and mild distress  Attitude:  cooperative  Eye Contact:  good  Mood:  anxious and depressed  Affect:  appropriate and in normal range, mood congruent and labile  Speech:  clear, coherent rate /rhythm are normal  Psychomotor Behavior:  no evidence of tardive dyskinesia, dystonia, or tics  Throught Process:  logical and linear  Associations:  no loose associations  Thought Content:  no evidence of suicidal ideation or homicidal ideation, no evidence of psychotic thought, no auditory hallucinations present and no visual hallucinations present  Insight:  good  Judgement:  intact  Oriented to:  time, person, and place  Attention Span and Concentration:  fair  Recent and Remote Memory:  intact  Language fund of knowledge are adequate      ASSESSMENT/PLAN                                                      (F11.20) Opioid use disorder, moderate, dependence (H)  (primary encounter diagnosis)  Comment: Denies side effects from Suboxone reports craving generally stable when taking suboxone with occasional curiosity about using heroin that led to relapse.   Plan: Continue buprenorphine HCl-naloxone HCl (SUBOXONE) 2-0.5mg SL tab MG per film; Place 2 tablets under the tongue twice daily.   Continue outpatient treatment with sober living and letter  of support provided to patient to advocate for him to remain at [facility] until he can transition to another sober living facility.    (F33.1) Moderate episode of recurrent major depressive disorder (H)  Comment: Mood improved with increase in Wellbutrin, still having difficulties with focus with  mg daily.  Plan: Continue Wellbutrin  mg tablets, take 1 tablet by mouth once daily and encourage patient to schedule ADHD testing.     By history (F90.0) ADHD (attention deficit hyperactivity disorder), inattentive type  Comment: ADHD testing as a child.  Mild improvement in focus with Wellbutrin but still having difficulty during meetings and treatment programming.  Not currently working.  Plan: Patient to schedule ADHD testing (referral in place) and patient aware no stimulants will be trialed unless indicated by testing results.  Continue Wellbutrin  mg daily as above.    (F17.200) Tobacco use disorder  Comment: Less tobacco use with Wellbutrin.  Still smoking and would like to quit.   Plan: Continue nicotine (NICORETTE) 2 MG gum, pack 1 piece of gum every hour as needed for tobacco cessation.  nicotine (NICODERM CQ) 21 MG/24HR 24 hr patch Place 1 patch on the skin every day.    (Z79.899) High risk medication use  Comment: Suboxone  Plan: Routine UDS, monitor for return to use and cravings        ENCOUNTER FOR LONG TERM USE OF HIGH RISK MEDICATION   High Risk Drug Monitoring?  YES   Drug being monitored: Buprenorphine   Reason for drug: Opioid use disorder   What is being monitored?: Dosage, Cravings, Trigger, side effects, and continued abstinence.      Follow-up: 2 weeks - patient aware he may call or reschedule for sooner as needed with another provider as I will be out of the office.     Patient counseling completed today:  Counseled the patient on the importance of having a recovery program in addition to Suboxone maintenance including reaching out to his sponsor regularly, especially when  mood is low and feeling lonely.  Also discussed having a sober support network, not isolating, being open, honest, and willing to change, avoiding triggers and managing cravings.  In addition, abstinence from alcohol, benzodiazepines, THC, opioids and other drugs of abuse was recommended.      Risk of overdose following a period of abstinence due to decrease tolerance was discussed including risk of death.  Risk of overdose if using Suboxone with other substances particuarly benzodiazepines, alcohol, gabapentin, or other CNS depressants was reviewed.      Jasmin Coppola CNP  [facility] - Addiction Medicine  [facility]  [phone]

## 2020-02-24 NOTE — LETTER
February 24, 2020      Bebeto Alvarado  [address]  [place] [address]              To Whom it May Concern,      I have been working with Tavo since September 2019 and he has been everything recommended to maintain sobriety including attending all office visits, completing urine drug screens, and is very committed to his sobriety.  As you know, addiction is a disease and he is doing his best every day.      Please consider allowing Tavo to stay at [facility] until he is able to move into a new sober living facility.  There is significant concern for his continued use if he does not have housing.  He has not used since 2/21/2020 and has been maintained on his Suboxone as proved by his urine drug screen.    If you have any questions please do not hesitate to call me.            Sincerely,        NEEL Dunn, DNP, CNP

## 2020-03-03 ENCOUNTER — TELEPHONE (OUTPATIENT)
Dept: ADDICTION MEDICINE | Facility: CLINIC | Age: 30
End: 2020-03-03

## 2020-03-03 NOTE — TELEPHONE ENCOUNTER
We will discuss this with the patient at his follow up on the 9th.  The risk of taking benzos and suboxone silmultaneously is greater than the risk of not doing this but may be less than we originally thought.  I would advise him NOT to do this, however, we are not going to cut him off from his suboxone at this time.     Alannah Whitaker MD

## 2020-03-03 NOTE — TELEPHONE ENCOUNTER
"Spoke with Darren, therapist from [facility]-- patient in room as well.   Darren reports patient not taking suboxone as prescribed-- also tested positive for benzos 3/2 and 3/3, with today's UDS also being POS for fentanyl.     Patient reports he didn't take his suboxone yesterday as he was using fentanyl-- states yesterday was the only day he missed, is on now as prescribed. Denied any withdrawal symptoms.     Writer advised patient against use of opioids, and educated on increased risk of overdose on opioids if he were to use. Writer also educated on importance of taking suboxone as prescribed.  Patient verbalized understanding.     Darren concerned about patient's POS UDS for benzos-- asked writer for our policy regarding patients who are positive for benzos stating \"We don't want a dead body here, and he is under your care for this\". Darren requested a provider be consulted with regards to the POS UDS. Will route to covering providers for review.    Patient stated he will be at follow up with Jasmin on 3/9. Writer informed patient to call clinic with any questions or concerns prior to his appointment. Patient verbalized understanding and agreed to plan.       Thanks,   ORACIO Salinas  Addiction Medicine Nurse Liaison  [phone]    "

## 2020-03-03 NOTE — TELEPHONE ENCOUNTER
Spoke with Darren-- relayed information from provider.   Darren wondering about the safety of the patient-- writer informed Darren that if he or other staff are concerned about the patient's safety, they can call us back during business hours, or send patient to ED. Darren understood and agreed to do this if needed.    Rita Caldera, RN    Nurse Liaison  [facility]    Addiction Medicine Services

## 2020-03-09 ENCOUNTER — OFFICE VISIT (OUTPATIENT)
Dept: ADDICTION MEDICINE | Facility: CLINIC | Age: 30
End: 2020-03-09
Payer: COMMERCIAL

## 2020-03-09 VITALS
OXYGEN SATURATION: 99 % | TEMPERATURE: 97.8 F | WEIGHT: 155 LBS | BODY MASS INDEX: 24.33 KG/M2 | HEART RATE: 80 BPM | DIASTOLIC BLOOD PRESSURE: 64 MMHG | HEIGHT: 67 IN | SYSTOLIC BLOOD PRESSURE: 120 MMHG

## 2020-03-09 DIAGNOSIS — F90.0 ADHD (ATTENTION DEFICIT HYPERACTIVITY DISORDER), INATTENTIVE TYPE: ICD-10-CM

## 2020-03-09 DIAGNOSIS — F33.1 MODERATE EPISODE OF RECURRENT MAJOR DEPRESSIVE DISORDER (H): ICD-10-CM

## 2020-03-09 DIAGNOSIS — Z79.899 HIGH RISK MEDICATION USE: ICD-10-CM

## 2020-03-09 DIAGNOSIS — F11.20 OPIOID USE DISORDER, MODERATE, DEPENDENCE (H): Primary | ICD-10-CM

## 2020-03-09 PROCEDURE — 80306 DRUG TEST PRSMV INSTRMNT: CPT | Performed by: NURSE PRACTITIONER

## 2020-03-09 PROCEDURE — 99215 OFFICE O/P EST HI 40 MIN: CPT | Performed by: NURSE PRACTITIONER

## 2020-03-09 RX ORDER — BUPROPION HYDROCHLORIDE 450 MG/1
450 TABLET, FILM COATED, EXTENDED RELEASE ORAL DAILY
Qty: 30 TABLET | Refills: 0 | Status: SHIPPED | OUTPATIENT
Start: 2020-03-09 | End: 2020-04-07

## 2020-03-09 RX ORDER — BUPRENORPHINE HYDROCHLORIDE AND NALOXONE HYDROCHLORIDE DIHYDRATE 2; .5 MG/1; MG/1
2 TABLET SUBLINGUAL 2 TIMES DAILY
Qty: 28 TABLET | Refills: 0 | Status: SHIPPED | OUTPATIENT
Start: 2020-03-09 | End: 2020-03-17

## 2020-03-09 ASSESSMENT — MIFFLIN-ST. JEOR: SCORE: 1621.71

## 2020-03-09 NOTE — PROGRESS NOTES
SUBJECTIVE                                                    CC: Patient presents with:  Recheck Medication    BUPRENORPHINE FOLLOW UP: current dose Suboxone 4-1 mg BID.     Bebeto Alvarado is a 29 year old male who presents to clinic today for follow up of Buprenorphine, mood, and ADHD symptoms.      Date of last visit:  2/24/2020  No-shows/Late cancels: None    Primary Care Provider: NEEL Tariq CNP   [place] Board of Pharmacy Data Base Reviewed:    Yes; reviewed today and no concerns for diversionary activity.  Most recent data includes:  02/24/2020 2 02/24/2020 Buprenorphn-Naloxn 2-0.5 Mg Sl  56.00 14    Brief History:    Initial visit with me on 11/20/2019  Recreational use of mushrooms and marijuana as a teen.  At 18 started using oxycontin orally at parties then started smoking heroin at age 20. Around age [age] started using heroin daily to avoid withdrawal symptoms.  Detox and treatment at [facility] in 10/2018 for one week but asked to leave as he was accused of buying drugs on the campus.  Started using heroin IV in 06/2019 1/2 to 1 gram daily.  Detox admission 11/15/2019 -11/19/2019 and went to [facility] once a bed was available.  Graduated and transitioned to [facility] on 12/30/2019 (OP treatment with sober housing).      Tobacco use - reducing, 5 cigarettes daily and wants to quit with support from nicotine gum and nicotine patches and wellbutrin     Hepatitis C Status - Nonreactive 11/14/2019              HIV status - Nonreactive 11/14/2019     Date of last use:   Heroin 11/13/2019   THC - 11/2019 prior to [facility]    HPI:  3/9/2020  Missed one day of suboxone per patient - on Sunday for using heroin later that day.  Reports heroin had benzodiazepines in it but no intentional use of benzodiazepines.  UDS negative for opiates and benzodiazepines today.  No cravings with 8-2mg daily.  Discussed Sublocade monthly injection which he is interested in getting if approved by  "his insurance.  Rare trazodone use and sleeping well.      Discussed his counselor Darren's concern for patient safety with benzodiazepines in his urine.  Reviewed overdose risk and he adamantly denies benzo use.  He has been at [facility] since his last visit on 2/23/2020.  Mood stable overall on wellbutrin.  Guilt related to relapse and occasional low moods on the weekends when alone and bored.  Denies SI.       Patient status since last visit: improving    Cravings: denies    MAT Dose: adequate    Side Effects: none.      Cues to use and relapse triggers: None known to patient    Recovery program has been: Actively attending outpatient CD treatment at [facility], on restriction due to recent relapse.    Sobriety:     Drug Screen: obtained.         Social History     Social History Narrative    Current living situation: prior to treatment was living with [place] mom [place] of [place] while waiting to get into treatment; Now is living in [facility] sober house with roommates.     Marital status: single, never     Children: none        Employment/financial status: Unemployed currently; dropped out of high school senior year - was smoking THC; no GED but wants to get in future; previous jobs include  for [employer] - lost job for using as he was \"slacking\" at work; he reports interest in working in customer service or sales in the future    Current recovery program (meetings, sponsorship): Graduated from [employer], then kicked out of [facility] for relapse and now at [facility] with OP treatment, attending NA meetings    Transportation: own car and license    Legal hx: on probation for 5th degree possession of a fake prescription for opioids that a \"friend\" talked him into trying to get filled at the pharmacy; 1 year of probation left; no pending court dates     Social History:   Reviewed -See above for changes since last visit.     Problem list " and histories reviewed & adjusted, as indicated.  Additional history: as documented    Patient Active Problem List    Diagnosis Date Noted     History of heroin abuse (H) 2019     Priority: Medium     Opioid dependence, uncomplicated (H) 2019     Priority: Medium     Attention deficit disorder 12/15/2004     Priority: Medium     Epic       Cannabis abuse 12/15/2004     Priority: Medium     Epic         Current Medications:  [] azithromycin (ZITHROMAX) 500 MG tablet, Take 2 tablets (1,000 mg) by mouth daily for 1 day  buprenorphine-naloxone (SUBOXONE) 2-0.5 MG SUBL sublingual tablet, Place 2 tablets under the tongue 2 times daily  buPROPion HCl ER, XL, 450 MG TB24, Take 450 mg by mouth daily Take 1 tablet (450mg) by mouth every morning.  naloxone (NARCAN) 4 MG/0.1ML nasal spray, Spray 1 spray (4 mg) into one nostril alternating nostrils as needed for opioid reversal every 2-3 minutes until assistance arrives  nicotine (NICODERM CQ) 21 MG/24HR 24 hr patch, Place 1 patch onto the skin every 24 hours  nicotine (NICORETTE) 2 MG gum, Place 1-2 each (2-4 mg) inside cheek as needed for smoking cessation  traZODone (DESYREL) 50 MG tablet, Take 1-2 tablets ( mg) by mouth nightly as needed for sleep    No current facility-administered medications on file prior to visit.       Allergies   Allergen Reactions     Codeine Unknown     Pt has been told.         REVIEW OF SYSTEMS:  General:  No acute withdrawal symptoms.  No recent infections or fever  Eyes:  +++glasses, No vision concerns.  No double vision.    Resp: No coughing, wheezing or shortness of breath  CV: No chest pains or palpitations  GI: No nausea, vomiting, abdominal pain, diarrhea.  No constipation  : No urinary frequency or dysuria    Musculoskeletal: No significant muscle or joint pains other than as above.  No edema  Neurologic: No numbness, tingling, weakness, problems with balance or coordination  Psychiatric: No acute concerns other  "than as above.   Skin: No rashes or areas of acute infection      OBJECTIVE                                                    LABS:  Labs reviewed.  Urine tox results POS: buprenorphine today.     Results for orders placed or performed in visit on 03/09/20   Urine Drugs of Abuse Screen Panel 13     Status: Abnormal   Result Value Ref Range    Cannabinoids (16-bzc-9-carboxy-9-THC) Not Detected NDET^Not Detected ng/mL    Phencyclidine (Phencyclidine) Not Detected NDET^Not Detected ng/mL    Cocaine (Benzoylecgonine) Not Detected NDET^Not Detected ng/mL    Methamphetamine (d-Methamphetamine) Not Detected NDET^Not Detected ng/mL    Opiates (Morphine) Not Detected NDET^Not Detected ng/mL    Amphetamine (d-Amphetamine) Not Detected NDET^Not Detected ng/mL    Benzodiazepines (Nordiazepam) Not Detected NDET^Not Detected ng/mL    Tricyclic Antidepressants (Desipramine) Not Detected NDET^Not Detected ng/mL    Methadone (Methadone) Not Detected NDET^Not Detected ng/mL    Barbiturates (Butalbital) Not Detected NDET^Not Detected ng/mL    Oxycodone (Oxycodone) Not Detected NDET^Not Detected ng/mL    Propoxyphene (Norpropoxyphene) Not Detected NDET^Not Detected ng/mL    Buprenorphine (Buprenorphine) Detected, Abnormal Result (A) NDET^Not Detected ng/mL       PHYSICAL EXAM:  /64   Pulse 80   Temp 97.8  F (36.6  C) (Temporal)   Ht 1.702 m (5' 7\")   Wt 70.3 kg (155 lb)   SpO2 99%   BMI 24.28 kg/m      GENERAL APPEARANCE:  alert, comfortable appearing  EYES:Eyes grossly normal to inspection  NEURO:  Gait normal.  No tremor. Coordination intact.     MENTAL STATUS EXAM:  Appearance:  awake, alert, well groomed, cooperative and no apparent distress  Attitude:  cooperative  Eye Contact:  good  Mood:  better  Affect:  appropriate and in normal range and mood congruent  Speech:  clear, coherent rate /rhythm are normal  Psychomotor Behavior:  no evidence of tardive dyskinesia, dystonia, or tics  Throught Process:  logical and " linear  Associations:  no loose associations  Thought Content:  no evidence of suicidal ideation or homicidal ideation, no evidence of psychotic thought, no auditory hallucinations present and no visual hallucinations present  Insight:  good  Judgement:  intact  Oriented to:  time, person, and place  Attention Span and Concentration:  fair  Recent and Remote Memory:  intact  Language fund of knowledge are adequate      ASSESSMENT/PLAN                                                      (F11.20) Opioid use disorder, moderate, dependence (H)  (primary encounter diagnosis)  Comment: Used heroin once yesterday and missed one dose of suboxone.  Denies side effects from Suboxone, Cravings stable when taking suboxone, doesn't know triggers for return to use.   Plan: Continue buprenorphine HCl-naloxone HCl (SUBOXONE) 4-1         MG per film; Place 1 film under the tongue twice daily.  - Sublocade 100 mg monthly injection ordered at this time for authorization from insurance to aid in medication adherence.  Will remain on oral until injection. Can take morning oral dose the day of injection.   Continue outpatient treatment at [facility].  Recommend reaching out to your sponsor daily or finding a new sponsor that you connect with.     (F33.1) Moderate episode of recurrent major depressive disorder (H)  Comment: Mood improved with increase in Wellbutrin.  Mood occasionally low and guilt related to substance use.  Plan: Continue Wellbutrin  mg tablets, take 1 tablet by mouth once daily.    By history (F90.0) ADHD (attention deficit hyperactivity disorder), inattentive type  Comment: ADHD testing as a child.  Some improvement in focus with Wellbutrin.  Not currently working.  Plan: Recommend ADHD testing and patient aware no stimulants will be trialed unless indicated by testing results.  Continue Wellbutrin to 450 mg daily as above.    (Z79.899) High risk medication use  Comment: Suboxone  Plan: Routine UDS,  monitor for return to use and cravings        ENCOUNTER FOR LONG TERM USE OF HIGH RISK MEDICATION   High Risk Drug Monitoring?  YES   Drug being monitored: Buprenorphine   Reason for drug: Opioid use disorder   What is being monitored?: Dosage, Cravings, Trigger, side effects, and continued abstinence.      Follow-up: 1 week as patient continues to have brief relapses    Patient counseling completed today:  Counseled the patient on the importance of having a recovery program in addition to Suboxone maintenance.  Also discussed having a sober support network, not isolating, being open, honest, and willing to change, avoiding triggers and managing cravings.  In addition, abstinence from alcohol, benzodiazepines, THC, opioids and other drugs of abuse was recommended.  Risk of overdose following a period of abstinence due to decrease tolerance was discussed including risk of death.  Risk of overdose if using Suboxone with other substances particuarly benzodiazepines, alcohol, gabapentin, or other CNS depressants was reviewed.      Jasmin Coppola CNP  [facility] - Addiction Medicine  [facility]  [phone]

## 2020-03-09 NOTE — PATIENT INSTRUCTIONS
Continue taking your medications as prescribed.  Your wellbutrin and burprenorphine were filled today.  I will see you again in 1 week.    I will order the sublocade injection and you will get called to schedule the injection once approved by your insurance.     Get a sponsor that you feel comfortable working with and continue to spend time around others and reach out when you notice you start to isolate.     Please call us with any issues or concerns and I will see you in 1 week!

## 2020-03-10 PROBLEM — F11.20 OPIOID USE DISORDER, MODERATE, DEPENDENCE (H): Status: ACTIVE | Noted: 2020-03-10

## 2020-03-10 RX ORDER — LIDOCAINE HYDROCHLORIDE 10 MG/ML
2 INJECTION, SOLUTION EPIDURAL; INFILTRATION; INTRACAUDAL; PERINEURAL ONCE
Status: CANCELLED | OUTPATIENT
Start: 2020-03-10

## 2020-03-17 ENCOUNTER — OFFICE VISIT (OUTPATIENT)
Dept: ADDICTION MEDICINE | Facility: CLINIC | Age: 30
End: 2020-03-17
Payer: COMMERCIAL

## 2020-03-17 VITALS
RESPIRATION RATE: 16 BRPM | BODY MASS INDEX: 24.48 KG/M2 | SYSTOLIC BLOOD PRESSURE: 133 MMHG | WEIGHT: 156 LBS | HEART RATE: 89 BPM | DIASTOLIC BLOOD PRESSURE: 67 MMHG | HEIGHT: 67 IN | OXYGEN SATURATION: 99 % | TEMPERATURE: 98.4 F

## 2020-03-17 DIAGNOSIS — Z79.899 HIGH RISK MEDICATION USE: ICD-10-CM

## 2020-03-17 DIAGNOSIS — F33.1 MODERATE EPISODE OF RECURRENT MAJOR DEPRESSIVE DISORDER (H): ICD-10-CM

## 2020-03-17 DIAGNOSIS — F90.0 ADHD (ATTENTION DEFICIT HYPERACTIVITY DISORDER), INATTENTIVE TYPE: ICD-10-CM

## 2020-03-17 DIAGNOSIS — F11.20 OPIOID USE DISORDER, MODERATE, DEPENDENCE (H): Primary | ICD-10-CM

## 2020-03-17 PROCEDURE — 80306 DRUG TEST PRSMV INSTRMNT: CPT | Performed by: NURSE PRACTITIONER

## 2020-03-17 PROCEDURE — 99214 OFFICE O/P EST MOD 30 MIN: CPT | Performed by: NURSE PRACTITIONER

## 2020-03-17 RX ORDER — BUPRENORPHINE HYDROCHLORIDE AND NALOXONE HYDROCHLORIDE DIHYDRATE 2; .5 MG/1; MG/1
2 TABLET SUBLINGUAL 2 TIMES DAILY
Qty: 28 TABLET | Refills: 0 | Status: SHIPPED | OUTPATIENT
Start: 2020-03-17 | End: 2020-03-24

## 2020-03-17 ASSESSMENT — MIFFLIN-ST. JEOR: SCORE: 1626.24

## 2020-03-17 NOTE — PROGRESS NOTES
SUBJECTIVE                                                    CC: Patient presents with:  RECHECK    BUPRENORPHINE FOLLOW UP: current dose Suboxone 4-1 mg BID.     Bebeto Alvarado is a 29 year old male who presents to clinic today for follow up of Buprenorphine, mood, and ADHD symptoms.      Date of last visit:  03/09/2020  No-shows/Late cancels: None    Primary Care Provider: NEEL Tariq CNP   [place] Board of Pharmacy Data Base Reviewed:    Yes; reviewed today and no concerns for diversionary activity.  Most recent data includes:  03/09/2020 1 03/09/2020 Buprenorphn-Naloxn 2-0.5 Mg   #28.00 7 Kr Calli    Brief History:    Initial visit with me on 11/20/2019  Recreational use of mushrooms and marijuana as a teen.  At 18 started using oxycontin orally at parties then started smoking heroin at age 20. Around age [age] started using heroin daily to avoid withdrawal symptoms.  Detox and treatment at [facility] in 10/2018 for one week but asked to leave as he was accused of buying drugs on the campus.  Started using heroin IV in 06/2019 1/2 to 1 gram daily.  Detox admission 11/15/2019 -11/19/2019 and went to [facility] once a bed was available.  Graduated and transitioned to [facility] on 12/30/2019 (OP treatment with sober housing).      Tobacco use - reducing, 5 cigarettes daily and wants to quit with support from nicotine gum and nicotine patches and wellbutrin     Hepatitis C Status - Nonreactive 11/14/2019              HIV status - Nonreactive 11/14/2019     Date of last use:   Heroin 11/13/2019   THC - 11/2019 prior to [facility]      HPI:  3/17/2020  Mood is better, reports mood was low and it was more difficult to cope when his parents were out of town.  He plans to stay at his dad's house over the weekend to increase ability to stay busy with home exercise and working on his dad's car.      Encouraged patient to write out a daily schedule and follow it every day to prevent boredom  "and excessive downtime.  He is concerned about risk of relapse given lack of CD programming during current pandemic.  He is living in a sober house with 25 people and isn't sure the disposition of the residents in the future.  He would be able to stay at his parents house if needed.  Discussed online meetings and using resources he has available to him during period of social distancing without in-person recovery meetings.    He has supportive friends, family members, and his roommate in CD treatment.  His treatment center is requesting him to get a refill on his narcan which is on file at the pharmacy and patient was encouraged to request it be filled when presenting to the pharmacy today.     Patient status since last visit: improving    Cravings: denies    MAT Dose: adequate    Side Effects: none.      Cues to use and relapse triggers: None known to patient    Recovery program has been: Actively attending outpatient CD treatment at [facility], on restriction due to recent relapse.    Sobriety:     Drug Screen: obtained.       Social History     Social History Narrative    Current living situation: prior to treatment was living with his mom [place] the [place] of [place] while waiting to get into treatment; Now is living in [facility] sober house with roommates.     Marital status: single, never     Children: none        Employment/financial status: Unemployed currently; dropped out of high school senior year - was smoking THC; no GED but wants to get in future; previous jobs include  for [employer] - lost job for using as he was \"slacking\" at work; he reports interest in working in customer service or sales in the future    Current recovery program (meetings, sponsorship): Graduated from [employer] [facility], then kicked out of [facility] for relapse and now at [facility] with OP treatment, attending NA meetings    Transportation: own car and license    Legal hx: " "on probation for 5th degree possession of a fake prescription for opioids that a \"friend\" talked him into trying to get filled at the pharmacy; 1 year of probation left; no pending court dates     Social History:   Reviewed -See above for changes since last visit.     Problem list and histories reviewed & adjusted, as indicated.  Additional history: as documented    Patient Active Problem List    Diagnosis Date Noted     Opioid use disorder, moderate, dependence (H) 03/10/2020     Priority: Medium     History of heroin abuse (H) 12/31/2019     Priority: Medium     Opioid dependence, uncomplicated (H) 12/01/2019     Priority: Medium     Attention deficit disorder 12/15/2004     Priority: Medium     Epic       Cannabis abuse 12/15/2004     Priority: Medium     Epic         Current Medications:  buprenorphine-naloxone (SUBOXONE) 2-0.5 MG SUBL sublingual tablet, Place 2 tablets under the tongue 2 times daily  buPROPion HCl ER, XL, 450 MG TB24, Take 450 mg by mouth daily Take 1 tablet (450mg) by mouth every morning.  naloxone (NARCAN) 4 MG/0.1ML nasal spray, Spray 1 spray (4 mg) into one nostril alternating nostrils as needed for opioid reversal every 2-3 minutes until assistance arrives  nicotine (NICODERM CQ) 21 MG/24HR 24 hr patch, Place 1 patch onto the skin every 24 hours  nicotine (NICORETTE) 2 MG gum, Place 1-2 each (2-4 mg) inside cheek as needed for smoking cessation  traZODone (DESYREL) 50 MG tablet, Take 1-2 tablets ( mg) by mouth nightly as needed for sleep    No current facility-administered medications on file prior to visit.       Allergies   Allergen Reactions     Codeine Unknown     Pt has been told.         REVIEW OF SYSTEMS:  General:  No acute withdrawal symptoms.  No recent infections or fever  Eyes:  +++glasses, No vision concerns.  No double vision.    Resp: No coughing, wheezing or shortness of breath  CV: No chest pains or palpitations  GI: No nausea, vomiting, abdominal pain, diarrhea.  No " "constipation  : No urinary frequency or dysuria    Musculoskeletal: No significant muscle or joint pains other than as above.  No edema  Neurologic: No numbness, tingling, weakness, problems with balance or coordination  Psychiatric: No acute concerns other than as above.   Skin: No rashes or areas of acute infection      OBJECTIVE                                                    LABS:  Labs reviewed.  Urine tox results POS: buprenorphine today.     Results for orders placed or performed in visit on 03/17/20   Urine Drugs of Abuse Screen Panel 13     Status: Abnormal   Result Value Ref Range    Cannabinoids (27-wdp-0-carboxy-9-THC) Not Detected NDET^Not Detected ng/mL    Phencyclidine (Phencyclidine) Not Detected NDET^Not Detected ng/mL    Cocaine (Benzoylecgonine) Not Detected NDET^Not Detected ng/mL    Methamphetamine (d-Methamphetamine) Not Detected NDET^Not Detected ng/mL    Opiates (Morphine) Not Detected NDET^Not Detected ng/mL    Amphetamine (d-Amphetamine) Not Detected NDET^Not Detected ng/mL    Benzodiazepines (Nordiazepam) Not Detected NDET^Not Detected ng/mL    Tricyclic Antidepressants (Desipramine) Not Detected NDET^Not Detected ng/mL    Methadone (Methadone) Not Detected NDET^Not Detected ng/mL    Barbiturates (Butalbital) Not Detected NDET^Not Detected ng/mL    Oxycodone (Oxycodone) Not Detected NDET^Not Detected ng/mL    Propoxyphene (Norpropoxyphene) Not Detected NDET^Not Detected ng/mL    Buprenorphine (Buprenorphine) Detected, Abnormal Result (A) NDET^Not Detected ng/mL       PHYSICAL EXAM:  /67   Pulse 89   Temp 98.4  F (36.9  C) (Oral)   Resp 16   Ht 1.702 m (5' 7\")   Wt 70.8 kg (156 lb)   SpO2 99%   BMI 24.43 kg/m      GENERAL APPEARANCE:  alert, comfortable appearing  EYES:Eyes grossly normal to inspection  NEURO:  Gait normal.  No tremor. Coordination intact.     MENTAL STATUS EXAM:  Appearance:  awake, alert, well groomed, cooperative and no apparent distress  Attitude:  " cooperative  Eye Contact:  good  Mood:  better  Affect:  appropriate and in normal range and mood congruent  Speech:  clear, coherent rate /rhythm are normal  Psychomotor Behavior:  no evidence of tardive dyskinesia, dystonia, or tics  Throught Process:  logical and linear  Associations:  no loose associations  Thought Content:  no evidence of suicidal ideation or homicidal ideation, no evidence of psychotic thought, no auditory hallucinations present and no visual hallucinations present  Insight:  good  Judgement:  intact  Oriented to:  time, person, and place  Attention Span and Concentration:  fair  Recent and Remote Memory:  intact  Language fund of knowledge are adequate      ASSESSMENT/PLAN                                                      (F11.20) Opioid use disorder, moderate, dependence (H)  (primary encounter diagnosis)  Comment: No opioid use since last visit.  UDS at tx center positive this AM but negative in clinic today.  Copy of UDS from today provided to patient.  Denies side effects from Suboxone and cravings stable when taking suboxone.   Plan: Continue buprenorphine HCl-naloxone HCl (SUBOXONE) 2-0.5         MG SL tablets as covered by patient's insurance; Place 2 tablets under the tongue twice daily.#28  - Sublocade 100 mg monthly injection ordered at last visit and is pending authorization from insurance to aid in medication adherence.  Will remain on oral until injection.   OP CD treatment minimal at [facility] due to groups suspended during current viral pandemic and patient concerned about relapse.  Recommended virtual meetings, meditation phone applications, and home exercises to maintain routine and sanity.     - narcan rx with refills on file at the pharmacy and patient to request this to be filled by pharmacy staff per request of his sober house staff.     (F33.1) Moderate episode of recurrent major depressive disorder (H)  Comment: Mood overall stable despite no structure without  programming.   Plan: Continue Wellbutrin  mg tablets, take 1 tablet by mouth once daily.    By history (F90.0) ADHD (attention deficit hyperactivity disorder), inattentive type  Comment: ADHD testing as a child.  Some improvement in focus with Wellbutrin.  Not currently working.  Plan: Recommend ADHD testing and patient aware no stimulants will be trialed unless indicated by testing results.  Continue Wellbutrin to 450 mg daily as above.    (Z79.899) High risk medication use  Comment: Suboxone  Plan: Routine UDS, monitor for return to use and cravings        ENCOUNTER FOR LONG TERM USE OF HIGH RISK MEDICATION   High Risk Drug Monitoring?  YES   Drug being monitored: Buprenorphine   Reason for drug: Opioid use disorder   What is being monitored?: Dosage, Cravings, Trigger, side effects, and continued abstinence.      Follow-up: 1 week TELEPHONE VISIT - patient aware that his next visit will be over the phone.       Patient counseling completed today:  Counseled the patient on the importance of having maintaining structure given lack of programming during viral pandemic.  Also discussed having a sober support network VIRTUALLY, not isolating, being open, honest, and willing to change, avoiding triggers and managing cravings.  In addition, abstinence from alcohol, benzodiazepines, THC, opioids and other drugs of abuse was recommended.      Risk of overdose following a period of abstinence due to decrease tolerance was discussed including risk of death.        Jasmin Coppola, CNP  [facility] - Addiction Medicine  [facility]  [phone]

## 2020-03-23 NOTE — PROGRESS NOTES
"SUBJECTIVE                                                    Bebeto Alvarado is a 29 year old male who is being evaluated for mood and opioid use via a billable telephone visit.      The patient has been notified of following:     \"This telephone visit will be conducted via a call between you and your physician/provider. We have found that certain health care needs can be provided without the need for a physical exam.  This service lets us provide the care you need with a short phone conversation.  If a prescription is necessary we can send it directly to your pharmacy.  If lab work is needed we can place an order for that and you can then stop by our lab to have the test done at a later time.    If during the course of the call the physician/provider feels a telephone visit is not appropriate, you will not be charged for this service.\"     Bebeto Alvarado complains of    Chief Complaint   Patient presents with     Telephone     Recheck       I have reviewed and updated the patient's Past Medical History, Social History, Family History and Medication List.    ALLERGIES  Susan Montoya MA (MA signature)      Date of last visit:  3/17/2020  BUPRENORPHINE FOLLOW UP: current dose suboxone 2-0.5 mg SL Tabs; take 2 tablets under the tongue twice daily as covered by patient's insurance company.     Primary Care Provider: NEEL Tariq CNP   [place] Board of Pharmacy Data Base Reviewed:    Yes; reviewed and no concerns for diversionary activity.  Most recent data includes:  03/17/2020 1 03/17/2020 Buprenorphn-Naloxn 2-0.5 Mg Sl #28.00 7 Kr Calli    Brief History:    Initial visit with me on 11/20/2019  Recreational use of mushrooms and marijuana as a teen.  At 18 started using oxycontin orally at parties then started smoking heroin at age 20. Around age [age] started using heroin daily to avoid withdrawal symptoms.  Detox and treatment at [facility] in 10/2018 for one week but asked to leave as " he was accused of buying drugs on the campus.  Started using heroin IV in 06/2019 1/2 to 1 gram daily.  Detox admission 11/15/2019 -11/19/2019 and went to [facility] once a bed was available.  Graduated and transitioned to [facility] on 12/30/2019 (OP treatment with sober housing).      Tobacco use - reducing, 5 cigarettes daily and wants to quit with support from nicotine gum and nicotine patches and wellbutrin     Hepatitis C Status - Nonreactive 11/14/2019              HIV status - Nonreactive 11/14/2019    Date of last use:   Heroin 03/08/2020  THC - 11/2019 prior to [facility]    HPI:    3/24/2020  He is still at [facility] and he is hoping they will have video meetings set up in the next several few days.  His roommate is moving out today.  He also got his car back and drives to group a few blocks.  He is going to his mom's tomorrow to watch TV with his mom and have dinner.  He reports weekends are especially boring, residents at his sober house watch movies sometimes and hang out.  He misses being able to go bowling.      He is using trazodone although rarely to help with sleep.  He is still taking the wellbutrin  mg daily and reports mood overall stable, working to stay positive and busy.  He states boredom is the biggest concern and is working with his counselor on building structure during his downtime.  He is going to make a list of things to do during free time for an assignment from his counselor.  He has been journaling and has a few books he may read to keep busy.  He sounds somewhat apathetic with low motivation to execute tasks to keep him busy during this time.     Patient status since last visit: stable    Cravings: denies     MAT Dose: adequate    Side Effects: none.      Cues to use and relapse triggers: boredom and apathy    Tobacco use - 10 cigarettes or less daily and is using the nicotine patch every few days and gum daily.       Social History:   Reviewed today.  See  "HPI for changes since last visit.     Problem list and histories reviewed & adjusted, as indicated.  Additional history: as documented    Patient Active Problem List    Diagnosis Date Noted     Opioid use disorder, moderate, dependence (H) 03/10/2020     Priority: Medium     History of heroin abuse (H) 12/31/2019     Priority: Medium     Opioid dependence, uncomplicated (H) 12/01/2019     Priority: Medium     Attention deficit disorder 12/15/2004     Priority: Medium     Epic       Cannabis abuse 12/15/2004     Priority: Medium     Epic         Current Medications:  buprenorphine-naloxone (SUBOXONE) 2-0.5 MG SUBL sublingual tablet, Place 2 tablets under the tongue 2 times daily  buPROPion HCl ER, XL, 450 MG TB24, Take 450 mg by mouth daily Take 1 tablet (450mg) by mouth every morning.  naloxone (NARCAN) 4 MG/0.1ML nasal spray, Spray 1 spray (4 mg) into one nostril alternating nostrils as needed for opioid reversal every 2-3 minutes until assistance arrives  nicotine (NICODERM CQ) 21 MG/24HR 24 hr patch, Place 1 patch onto the skin every 24 hours  nicotine (NICORETTE) 2 MG gum, Place 1-2 each (2-4 mg) inside cheek as needed for smoking cessation  traZODone (DESYREL) 50 MG tablet, Take 1-2 tablets ( mg) by mouth nightly as needed for sleep    No current facility-administered medications on file prior to visit.       OBJECTIVE                                                    LABS:  Labs reviewed. No UDS collected as patient evaluated over telephone visit.     MENTAL STATUS EXAM:  Appearance:  not assessed - telephone visit   Attitude:  cooperative and apathetic  Mood:  \"okay\"  Affect: not assessed - telephone visit   Psychomotor Behavior: not assessed - telephone visit   Thought Content:  no evidence of suicidal ideation or homicidal ideation, no evidence of psychotic thought, no auditory hallucinations present and no visual hallucinations present  Insight:  limited  Judgement:  limited  Oriented to:  time, " person, and place    ASSESSMENT/PLAN                                                      (F11.20) Opioid use disorder, moderate, dependence (H)  (primary encounter diagnosis)  Comment: No opioid use since 03/08/2020.  Denies side effects or cravings when taking Suboxone.  UDS completed randomly at his [facility].   Plan: Continue buprenorphine HCl-naloxone HCl (SUBOXONE) 2-0.5         MG SL tablets as covered by patient's insurance; Place 2 tablets under the tongue twice daily #28    - Sublocade 100 mg monthly injection ordered at past visit and is pending authorization from insurance to aid in medication adherence.  Will remain on oral until injection.     - Continue OP CD treatment at [facility].  Discussed need to reduce apathy and remain focused on one day at a time to remain sober.      - narcan rx with refills on file at the pharmacy and patient to request this to be filled by pharmacy staff per request of his [facility] staff.      (F33.1) Moderate episode of recurrent major depressive disorder (H)  Comment: Mood overall stable despite limited structure or activities during downtime.   Plan: Continue Wellbutrin  mg tablets, take 1 tablet by mouth once daily.     By history (F90.0) ADHD (attention deficit hyperactivity disorder), inattentive type  Comment: ADHD testing as a child.  Some improvement in focus with Wellbutrin.  Not currently working at job.  Plan: Recommend ADHD testing and patient aware no stimulants will be trialed unless indicated by testing results.  Continue Wellbutrin to 450 mg daily as above.     (Z79.899) High risk medication use  Comment: Suboxone  Plan: UDS, monitor for return to use and cravings      ENCOUNTER FOR LONG TERM USE OF HIGH RISK MEDICATION   High Risk Drug Monitoring?  YES   Drug being monitored: Buprenorphine   Reason for drug: Opioid use disorder   What is being monitored?: Dosage, Cravings, Trigger, side effects, and continued abstinence.      Follow-up: 1  week given high risk of relapse with significant apathy and boredom requiring increased support during time of social distancing and COVID-19 concerns.       Patient counseling completed today:  Counseled the patient on the importance of having a recovery program in addition to Suboxone maintenance.  Also discussed not isolating and willingness to change as well as importance of structure and staying busy and connected to others.        Phone call contact time (16 min)   Call Started at 15:10  Call Ended at 15:26    I have reviewed the note as documented above.  This accurately captures the substance of my conversation with the patient.      Jasmin Coppola CNP  [facility] Medicine  [facility]  [phone]

## 2020-03-24 ENCOUNTER — VIRTUAL VISIT (OUTPATIENT)
Dept: ADDICTION MEDICINE | Facility: CLINIC | Age: 30
End: 2020-03-24
Payer: COMMERCIAL

## 2020-03-24 DIAGNOSIS — F33.1 MODERATE EPISODE OF RECURRENT MAJOR DEPRESSIVE DISORDER (H): ICD-10-CM

## 2020-03-24 DIAGNOSIS — F90.0 ADHD (ATTENTION DEFICIT HYPERACTIVITY DISORDER), INATTENTIVE TYPE: ICD-10-CM

## 2020-03-24 DIAGNOSIS — F11.20 OPIOID USE DISORDER, MODERATE, DEPENDENCE (H): Primary | ICD-10-CM

## 2020-03-24 DIAGNOSIS — Z79.899 HIGH RISK MEDICATION USE: ICD-10-CM

## 2020-03-24 PROCEDURE — 99442 ZZC PHYSICIAN TELEPHONE EVALUATION 11-20 MIN: CPT | Performed by: NURSE PRACTITIONER

## 2020-03-24 RX ORDER — BUPRENORPHINE HYDROCHLORIDE AND NALOXONE HYDROCHLORIDE DIHYDRATE 2; .5 MG/1; MG/1
2 TABLET SUBLINGUAL 2 TIMES DAILY
Qty: 28 TABLET | Refills: 0 | Status: SHIPPED | OUTPATIENT
Start: 2020-03-24 | End: 2020-03-31

## 2020-03-30 NOTE — PROGRESS NOTES
"  SUBJECTIVE                                                    Bebeto Alvarado is a 29 year old male who is being evaluated via a billable VIDEO visit.      The patient has been notified of following:     \"This video visit will be conducted via a call between you and your physician/provider. We have found that certain health care needs can be provided without the need for an in-person physical exam.  This service lets us provide the care you need with a video conversation.  If a prescription is necessary we can send it directly to your pharmacy.  If lab work is needed we can place an order for that and you can then stop by our lab to have the test done at a later time.    If during the course of the call the physician/provider feels a video visit is not appropriate, you will not be charged for this service.\"     Roberto Brice MA (MA Signature)     Physician has received verbal consent for a Video Visit from the patient? Yes    Patient would like the video invitation sent by: Text to cell phone: [phone]    Video Start Time: 3:20PM       Date of last visit:  3/24/2020  BUPRENORPHINE FOLLOW UP: current dose suboxone 2-0.5 mg SL Tabs; take 2 tablets under the tongue twice daily as covered by patient's insurance company.     Primary Care Provider: NEEL Tariq CNP   [place] Board of Pharmacy Data Base Reviewed:    Yes; reviewed and no concerns for diversionary activity.  Most recent data includes:  03/24/2020 1 03/24/2020 Buprenorphn-Naloxn 2-0.5 Mg Sl  #28.00 7 Kr Calli     Brief History:    Initial visit with me on 11/20/2019  Recreational use of mushrooms and marijuana as a teen.  At 18 started using oxycontin orally at parties then started smoking heroin at age 20. Around age [age] started using heroin daily to avoid withdrawal symptoms.  Detox and treatment at [facility] in 10/2018 for one week but asked to leave as he was accused of buying drugs on the campus.  Started using heroin IV in " "06/2019 1/2 to 1 gram daily.  Detox admission 11/15/2019 -11/19/2019 and went to [facility] once a bed was available.  Graduated and transitioned to [facility] on 12/30/2019 then kicked out for 1 time heroin relapse and moved to [facility] for continued CD treatment.      Tobacco use - reducing, 5 cigarettes daily and wants to quit with support from nicotine gum and nicotine patches and wellbutrin     Hepatitis C Status - Nonreactive 11/14/2019               HIV status - Nonreactive 11/14/2019    Date of last use:   Heroin - 03/08/2020 IV use   THC - 11/2019    HPI:    3/31/2020  [facility] - boredom - Wellbutrin - Mood.  List of things to do in his free time homework assignment still not completed but he has been staying busy.  He is getting up at the same time daily, morning routine, and every day is developing more structure day by day.      He reports he is doing \"alright\" and is getting better - getting used to COVID-19 virtual programming.  A bit less programming than before by feels stable.  He got a new roommate today that he thinks he will get along with.  He didn't end up staying at his mom's but had lunch with his mom which he enjoyed.     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  He is calling tomorrow to set up an interview.  He reports occasional use of trazodone for sleep and took 100 mg which resulted in significant AM sedation and difficulty waking.  Discussed reducing to 50 mg nightly as needed which patient agreeable to.     Patient status since last visit: improving     Cravings: stable with improvement in weather especially    MAT Dose: adequate    Side Effects: none.      Cues to use and relapse triggers: None    Tobacco use - smoking less, about 4 daily and using nicotine gum a few times throughout the day   Hepatitis C status - non-reactive in 11/2019 will need future testing given relapse in 03/2019 with potential exposure (recent IV use) " Will recheck in 06/2020    Social History:   Reviewed today.  See HPI for changes since last visit.     Problem list and histories reviewed & adjusted, as indicated.  Additional history: as documented    Patient Active Problem List    Diagnosis Date Noted     Opioid use disorder, moderate, dependence (H) 03/10/2020     Priority: Medium     History of heroin abuse (H) 12/31/2019     Priority: Medium     Opioid dependence, uncomplicated (H) 12/01/2019     Priority: Medium     Attention deficit disorder 12/15/2004     Priority: Medium     Epic       Cannabis abuse 12/15/2004     Priority: Medium     Epic         Current Medications:  buprenorphine-naloxone (SUBOXONE) 2-0.5 MG SUBL sublingual tablet, Place 2 tablets under the tongue 2 times daily  buPROPion HCl ER, XL, 450 MG TB24, Take 450 mg by mouth daily Take 1 tablet (450mg) by mouth every morning.  naloxone (NARCAN) 4 MG/0.1ML nasal spray, Spray 1 spray (4 mg) into one nostril alternating nostrils as needed for opioid reversal every 2-3 minutes until assistance arrives  nicotine (NICORETTE) 2 MG gum, Place 1-2 each (2-4 mg) inside cheek as needed for smoking cessation  traZODone (DESYREL) 50 MG tablet, Take 1-2 tablets ( mg) by mouth nightly as needed for sleep    No current facility-administered medications on file prior to visit.       VERBAL PHYSICAL ROS:  10 system review (general, cardiovascular, respiratory, eyes, ENT, endocrine, GI, , M/S, neurological) was completed and is negative other than noted in the HPI.  No acute withdrawal symptoms.  No recent infections or fever.    OBJECTIVE                                                    LABS:  Labs reviewed. No UDS collected as patient evaluated over VIDEO visit.     MENTAL STATUS EXAM:  Appearance:  Well groomed, no apparent distress  Attitude:  cooperative  Mood:  better  Affect: bright and mood congruent   Psychomotor Behavior: calm and walking during   Thought Content:  no evidence of suicidal  ideation or homicidal ideation, no evidence of psychotic thought, no auditory hallucinations present and no visual hallucinations present  Insight:  fair  Judgement:  fair  Oriented to:  time, person, and place    ASSESSMENT/PLAN                                                      (F11.20) Opioid use disorder, moderate, dependence (H)  (primary encounter diagnosis)  Comment: No opioid use since 03/08/2020.  Denies side effects or cravings when taking Suboxone.  UDS completed randomly at his sober house.  Significant concern for relapse given history of relapse.  Less apathy at this time.  Will continue with close follow up via video visits.   Plan: Continue buprenorphine HCl-naloxone HCl (SUBOXONE) 2-0.5         MG SL tablets as covered by patient's insurance; Place 2 tablets under the tongue twice daily #28     - Sublocade 100 mg monthly injection ordered at past visit and is pending authorization from insurance to aid in medication adherence.  Will remain on oral formulation until injection.      - Continue OP CD treatment at [facility].  Discussed need to reduce apathy and remain focused on one day at a time to remain sober.      - narcan rx with refills on file at the pharmacy and patient to request this to be filled by pharmacy staff per request of his sober house staff.      (F33.1) Moderate episode of recurrent major depressive disorder (H)  Comment: Mood improved with increased structure and new routine as well as improvement in weather.   Plan: Continue Wellbutrin  mg tablets, take 1 tablet by mouth once daily.    (F17.200) Tobacco use disorder  Comment: smoking 4 cigs daily. Using nicotine gum not patches.  Wants to quit  Plan: Continue taking wellbutrin as above and nicotine 2 mg gum 1-2 pieces every hour as needed for tobacco cessation.     (Z79.899) High risk medication use  Comment: Suboxone  Plan: UDS, monitor for return to use and cravings    ENCOUNTER FOR LONG TERM USE OF HIGH RISK  MEDICATION   High Risk Drug Monitoring?  YES   Drug being monitored: Buprenorphine   Reason for drug: Opioid use disorder   What is being monitored?: Dosage, Cravings, Trigger, side effects, and continued abstinence.      Follow-up: 1 week via VIDEO visit.     Patient counseling completed today:  Counseled the patient on the importance of having a recovery program in addition to Suboxone maintenance.  Also discussed having a sober support network, not isolating, being open, honest, and willing to change, avoiding triggers and managing cravings.  In addition, abstinence from alcohol, benzodiazepines, THC, opioids and other drugs of abuse was recommended.  Risk of overdose following a period of abstinence due to decrease tolerance was discussed including risk of death.  Risk of overdose if using Suboxone with other substances particuarly benzodiazepines, alcohol, gabapentin, or other CNS depressants was reviewed.          Video-Visit Details    Type of service:  Video Visit    Video End Time (time video stopped): 3:44PM     Originating Location (pt. Location): Other [facility] [place]    Distant Location (provider location):  [place] [facility]    Mode of Communication:  Video Conference via HEALBE  I have reviewed the note as documented above.  This accurately captures the substance of my conversation with the patient.          Jasmin Coppola, CNP  [facility]  [facility]  [phone]

## 2020-03-31 ENCOUNTER — VIRTUAL VISIT (OUTPATIENT)
Dept: ADDICTION MEDICINE | Facility: CLINIC | Age: 30
End: 2020-03-31
Payer: COMMERCIAL

## 2020-03-31 ENCOUNTER — TELEPHONE (OUTPATIENT)
Dept: FAMILY MEDICINE | Facility: CLINIC | Age: 30
End: 2020-03-31

## 2020-03-31 DIAGNOSIS — Z79.899 HIGH RISK MEDICATION USE: ICD-10-CM

## 2020-03-31 DIAGNOSIS — F11.20 OPIOID USE DISORDER, MODERATE, DEPENDENCE (H): Primary | ICD-10-CM

## 2020-03-31 DIAGNOSIS — F33.1 MODERATE EPISODE OF RECURRENT MAJOR DEPRESSIVE DISORDER (H): ICD-10-CM

## 2020-03-31 DIAGNOSIS — F17.200 TOBACCO USE DISORDER: ICD-10-CM

## 2020-03-31 PROCEDURE — 99214 OFFICE O/P EST MOD 30 MIN: CPT | Mod: GT | Performed by: NURSE PRACTITIONER

## 2020-03-31 RX ORDER — TRAZODONE HYDROCHLORIDE 50 MG/1
50 TABLET, FILM COATED ORAL
Qty: 60 TABLET | Refills: 1 | COMMUNITY
Start: 2020-03-31 | End: 2020-07-03

## 2020-03-31 RX ORDER — BUPRENORPHINE HYDROCHLORIDE AND NALOXONE HYDROCHLORIDE DIHYDRATE 2; .5 MG/1; MG/1
2 TABLET SUBLINGUAL 2 TIMES DAILY
Qty: 28 TABLET | Refills: 0 | Status: SHIPPED | OUTPATIENT
Start: 2020-03-31 | End: 2020-04-07

## 2020-03-31 NOTE — TELEPHONE ENCOUNTER
Prior Authorization Retail Medication Request    Medication/Dose: suboxone 8-2 sublingual tabs   ICD code (if different than what is on RX):    Previously Tried and Failed:   Rationale:      Insurance Name:    Insurance ID:        Pharmacy Information (if different than what is on RX)  Name:    Phone:         Thank You,  Cathy Gonzalez, [facility]

## 2020-04-01 NOTE — TELEPHONE ENCOUNTER
Writer called the pharmacy staff to check in on status of PA message received from yesterday afternoon.  Pharmacy staff say PA message was entered in error and they filled 2 mg suboxone tablets as authorized by insurance.     Encounter closed as no further follow-up needed.

## 2020-04-07 ENCOUNTER — VIRTUAL VISIT (OUTPATIENT)
Dept: ADDICTION MEDICINE | Facility: CLINIC | Age: 30
End: 2020-04-07
Payer: COMMERCIAL

## 2020-04-07 DIAGNOSIS — F90.0 ADHD (ATTENTION DEFICIT HYPERACTIVITY DISORDER), INATTENTIVE TYPE: ICD-10-CM

## 2020-04-07 DIAGNOSIS — F11.20 OPIOID USE DISORDER, MODERATE, DEPENDENCE (H): ICD-10-CM

## 2020-04-07 PROCEDURE — 99214 OFFICE O/P EST MOD 30 MIN: CPT | Mod: GT | Performed by: NURSE PRACTITIONER

## 2020-04-07 RX ORDER — BUPROPION HYDROCHLORIDE 450 MG/1
450 TABLET, FILM COATED, EXTENDED RELEASE ORAL DAILY
Qty: 30 TABLET | Refills: 1 | Status: SHIPPED | OUTPATIENT
Start: 2020-04-07 | End: 2020-04-29 | Stop reason: ALTCHOICE

## 2020-04-07 RX ORDER — BUPRENORPHINE HYDROCHLORIDE AND NALOXONE HYDROCHLORIDE DIHYDRATE 2; .5 MG/1; MG/1
2 TABLET SUBLINGUAL 2 TIMES DAILY
Qty: 28 TABLET | Refills: 0 | Status: SHIPPED | OUTPATIENT
Start: 2020-04-07 | End: 2020-04-14

## 2020-04-07 NOTE — PROGRESS NOTES
"  [facility] ADDICTION MEDICINE  VIDEO Progress Note                                                      SUBJECTIVE                                                    Bebeto Alvarado is a 29 year old male who is being evaluated via a billable VIDEO visit.      The patient has been notified of following:     \"This video visit will be conducted via a call between you and your physician/provider. We have found that certain health care needs can be provided without the need for an in-person physical exam.  This service lets us provide the care you need with a video conversation.  If a prescription is necessary we can send it directly to your pharmacy.  If lab work is needed we can place an order for that and you can then stop by our lab to have the test done at a later time.    If during the course of the call the physician/provider feels a video visit is not appropriate, you will not be charged for this service.\"     Patient has given verbal consent for Video visit? Yes    Patient would like the video invitation sent by: Text to cell phone: [phone]    Mi Redman MA (MA signature)    Video Start Time: 1:02 PM    Bebeto Alvarado complains of    Chief Complaint   Patient presents with     Video Visit     Drug Problem       Date of last visit:  3/31/2020  BUPRENORPHINE FOLLOW UP: current dose oral suboxone 4 mg BID.  Using 2 mg tablets as authorized by insurance company.     Primary Care Provider: NEEL Tariq CNP   [place] Board of Pharmacy Data Base Reviewed:    Yes; reviewed and no concerns for diversionary activity.  Most recent data includes:  04/01/2020   2     03/31/2020   Buprenorphn-Naloxn 2-0.5 Mg Sl         28.00  7  Kr Calli     Brief History:    Initial visit with me on 11/20/2019  Recreational use of mushrooms and marijuana as a teen.  At 18 started using oxycontin orally at parties then started smoking heroin at age 20. Around age [age] started using heroin daily to " avoid withdrawal symptoms.  Detox and treatment at [facility] in 10/2018 for one week but asked to leave as he was accused of buying drugs on the campus.  Started using heroin IV in 06/2019 1/2 to 1 gram daily.  Detox admission 11/15/2019 -11/19/2019 and went to [facility] once a bed was available.  Graduated and transitioned to [facility] on 12/30/2019 then kicked out for 1 time heroin relapse and moved to [facility] for continued CD treatment.      Tobacco use - reducing, 5 cigarettes daily and wants to quit with support from nicotine gum and nicotine patches and wellbutrin     Hepatitis C Status - Nonreactive 11/14/2019               HIV status - Nonreactive 11/14/2019     Date of last use:   Heroin - 03/08/2020 IV use   THC - 11/2019    HPI:    4/7/2020  Patient reports mood is down on the weekends due to boredom and feels he is sick of being in treatment.  He feels group and programming feels negative and he almost left [facility] this weekend as he would rather be working and in sober living.  He spoke with his counselor and asked to be held more accountable for his assignments as he feels he is allowed to slack off and thus isn't making much progress.  He didn't hear back about the job he recently applied to and is going to keep applying elsewhere.    He states he has assignments on describing hope and what keeps him in treatment as well as what activities he uses to help cope with depression and boredom.  We will check in on this assignment next week at his visit as well for more accountability.  He denies SI and reports weekends are most difficult.  He has close peers that he feels he can reach out to and is just learning to reach out to them when needed.  He is having a difficult time opening up to his counselors as he has two that rotate their schedules.      He is using nicotine gum and still smoking about 5 cigarettes daily.  Denies other concerns and is open to continuing close follow up during  this period of isolation, boredom and high risk of relapse.     Patient status since last visit: unchanged    Cravings: denies    MAT Dose: adequate    Side Effects: none.      Cues to use and relapse triggers: boredom, low mood       I have reviewed and updated the patient's Past Medical History, Social History, Family History and Medication List.    Patient Active Problem List    Diagnosis Date Noted     Opioid use disorder, moderate, dependence (H) 03/10/2020     Priority: Medium     History of heroin abuse (H) 12/31/2019     Priority: Medium     Opioid dependence, uncomplicated (H) 12/01/2019     Priority: Medium     Attention deficit disorder 12/15/2004     Priority: Medium     Epic       Cannabis abuse 12/15/2004     Priority: Medium     Epic         Current Medications:  buprenorphine-naloxone (SUBOXONE) 2-0.5 MG SUBL sublingual tablet, Place 2 tablets under the tongue 2 times daily  buPROPion HCl ER, XL, 450 MG TB24, Take 450 mg by mouth daily Take 1 tablet (450mg) by mouth every morning.  naloxone (NARCAN) 4 MG/0.1ML nasal spray, Spray 1 spray (4 mg) into one nostril alternating nostrils as needed for opioid reversal every 2-3 minutes until assistance arrives  nicotine (NICORETTE) 2 MG gum, Place 1-2 each (2-4 mg) inside cheek as needed for smoking cessation  traZODone (DESYREL) 50 MG tablet, Take 1 tablet (50 mg) by mouth nightly as needed for sleep    No current facility-administered medications on file prior to visit.     REVIEW OF SYSTEMS:  General:  No acute withdrawal symptoms.  No recent infections or fever  Eyes/ENT:  No vision concerns.  No double vision.    Resp: No coughing, wheezing or shortness of breath  CV: No chest pains or palpitations  GI: No nausea, vomiting, abdominal pain, diarrhea.  No constipation  : No urinary frequency or dysuria    Musculoskeletal: No significant muscle or joint pains other than as above.  No edema  Neurologic: No numbness, tingling, weakness, problems with  balance or coordination  Psychiatric: No acute concerns other than as above.   Skin: No rashes or areas of acute infection      OBJECTIVE                                                    LABS:  Labs reviewed. No UDS collected as patient evaluated over telephone visit.     PHYSICAL EXAM:  GENERAL APPEARANCE:  alert, comfortable appearing  EYES:Eyes grossly normal to inspection, wearing glasses  NEURO:  Gait normal.  No tremor. Coordination intact.     MENTAL STATUS EXAM:  Appearance:  awake, alert, adequately groomed and no apparent distress  Attitude:  cooperative  Mood:  depressed  Affect:  appropriate and in normal range and mood congruent  Psychomotor Behavior:  no evidence of tardive dyskinesia, dystonia, or tics  Thought Content:  no evidence of suicidal ideation or homicidal ideation, no evidence of psychotic thought, no auditory hallucinations present and no visual hallucinations present  Insight:  fair  Judgement:  fair  Oriented to:  time, person, and place    ASSESSMENT/PLAN                                                      (F11.20) Opioid use disorder, moderate, dependence (H)  (primary encounter diagnosis)  Comment: No opioid use since 03/08/2020.  Denies side effects or cravings when taking Suboxone.  UDS completed randomly at his sober house.  Significant concern for relapse given history of relapse.  Increased apathy at this time.  Will continue with close follow up via video visits and additional accountability on homework assignments.   Plan: Continue buprenorphine HCl-naloxone HCl (SUBOXONE) 2-0.5         MG SL tablets as covered by patient's insurance; Place 2 tablets under the tongue twice daily #28     - Sublocade 100 mg monthly injection ordered at past visit and is pending authorization from insurance to aid in medication adherence.  Will remain on oral formulation until injection approved.      - Continue OP CD treatment at this time and consider risk versus benefit of prematurely moving  into sober housing without CD treatment.  Discussed need to reduce apathy and remain focused on one day at a time to remain sober.     - Will check in at our visits about his homework assignment progress for added accountability.      - narcan rx with refills on file at the pharmacy and patient to request this to be filled by pharmacy staff per request of his sober house staff.      (F33.1) Moderate episode of recurrent major depressive disorder (H)  Comment: Mood improved with increased structure and new routine as well as improvement in weather.   Plan: Continue Wellbutrin  mg tablets, take 1 tablet by mouth once daily.     (F17.200) Tobacco use disorder  Comment: smoking 4-5 cigs daily. Using nicotine gum.  Wants to quit but boredom gets in the way of quitting   Plan: Continue taking wellbutrin as above and nicotine 2 mg gum 1-2 pieces every hour as needed for tobacco cessation.      (Z79.899) High risk medication use  Comment: Suboxone  Plan: UDS, monitor for return to use and cravings    ENCOUNTER FOR LONG TERM USE OF HIGH RISK MEDICATION   High Risk Drug Monitoring?  YES   Drug being monitored: Buprenorphine   Reason for drug: Opioid use disorder   What is being monitored?: Dosage, Cravings, Trigger, side effects, and continued abstinence.      Follow-up: 1 week via VIDEO visit.     Patient counseling completed today:  Counseled the patient on the importance of having a recovery program in addition to Suboxone maintenance.  Also discussed having a sober support network, not isolating, being open, honest, and willing to change, avoiding triggers and managing cravings.      In addition, abstinence from alcohol, benzodiazepines, THC, opioids and other drugs of abuse was recommended.  Risk of overdose following a period of abstinence due to decrease tolerance was discussed including risk of death.  Risk of overdose if using Suboxone with other substances particuarly benzodiazepines, alcohol, gabapentin, or  other CNS depressants was reviewed.      Video-Visit Details    Type of service:  Video Visit    Video End Time (time video stopped): 1:22 PM    Originating Location (pt. Location): Other [facility] sober living     Distant Location (provider location):  [facility]IC[facility]    Mode of Communication:  Video Conference via [employer]      As the provider I attest to compliance with applicable laws and regulations related to telemedicine and that the above documentation accurately summarizes the assessments, treatment plan, and patient education completed during this visit.         Jasmin Coppola CNP  [facility] - Addiction Medicine  [facility]  [phone]

## 2020-04-09 ENCOUNTER — TELEPHONE (OUTPATIENT)
Dept: BEHAVIORAL HEALTH | Facility: CLINIC | Age: 30
End: 2020-04-09

## 2020-04-09 NOTE — TELEPHONE ENCOUNTER
Writer spoke with pt to screen for COVID-19 risk factors. For the past month he has been living at a residential treatment center with 30 other people, he has had a roommate some of the time he has been there. He has left the treatment center to  go on various trips into the community (smoke shop, grocery store etc), but nowhere particularly crowded. He said to his knowledge there is no one at the center that has been suspected or known to have COVID-19. He also denies having any COVID-19 symptoms. Information sent to infection prevention team to review.

## 2020-04-10 ENCOUNTER — HOSPITAL ENCOUNTER (OUTPATIENT)
Dept: BEHAVIORAL HEALTH | Facility: CLINIC | Age: 30
End: 2020-04-10
Attending: FAMILY MEDICINE
Payer: COMMERCIAL

## 2020-04-10 ENCOUNTER — BEH TREATMENT PLAN (OUTPATIENT)
Dept: BEHAVIORAL HEALTH | Facility: CLINIC | Age: 30
End: 2020-04-10
Attending: FAMILY MEDICINE

## 2020-04-10 DIAGNOSIS — F19.20 CHEMICAL DEPENDENCY (H): ICD-10-CM

## 2020-04-10 PROCEDURE — H2035 A/D TX PROGRAM, PER HOUR: HCPCS

## 2020-04-10 PROCEDURE — 10020000 ZZH LODGING PLUS FACILITY CHARGE ADULT

## 2020-04-10 PROCEDURE — H2035 A/D TX PROGRAM, PER HOUR: HCPCS | Mod: HQ

## 2020-04-10 NOTE — PROGRESS NOTES
[facility]  Adult Chemical Dependency Program  Treatment Plan Requirements    These services are provided by the facility for each patient/client according to the individual's treatment plan:    Individual and group counseling    Education    Transition services    Services to address any co-occurring mental illness    Service coordination    Initial Treatment Plan Goals:  1. Complete all the requirements of Program Orientation.  2. Maintain medication compliance throughout the program.  3. Complete requirements for workshop/skills groups based on identified issues on your problem list.  4. Complete the support group attendance feedback sheet weekly.  5. Gain family involvement in treatment process to address family issues from the problem list.  6. Attend and participate in all required groups per individual treatment plan.  7. Focus attention to individualized issues from the treatment plan.  8. Complete all requirements for UA's, alcohol screening tests and other testing.  9. Schedule a physical examination if recommended.    In addition to the above, complete all individual goals as specifically outlines on your treatment plan.    Criteria for discharge:  Patients/clients are discharged from the program following completion of the entire program including Phase I and II or acceptance of other post-treatment referrals such as retirement house, or aftercare at other facilities.  Patients/clients may also be discharged for inappropriate behavior or chemical use.      Favorable Discharge - Patients/clients have completed agreed upon treatment goals, understand their diagnosis and appear motivated about the follow-up care.    Guarded Discharge - Patients/clients have demonstrated some understanding of their diagnosis and recovery process, and have completed some of their treatment goals.  This prognosis also includes patients/clients who have completed some treatment goals but have not made  commitment to community support or follow through with referrals.    Unfavorable Discharge - Patients/clients have not completed agreed upon treatment goals due to their own choice, have limited understanding of their diagnosis, and have shown minimal or inconsistent behavior conducive to recovery.  Those patients/clients discharged due to behavioral problems will also be unfavorable discharges.                                  Adult CD Treatment Plan     Bebeto Alvarado [id number]   1990 29 year old male        ------------------------------------------------------------------------------------------------------------------  Acute Intoxication/Withdrawal Potential     DIMENSION 1  RISK FACTOR: 1             AssignmentDate Source Prob/Goal/  Intervention Target  Date Initials Outcome Completion  Date   4/10/20 Self -  Current, Collateral -  Current and Assessment -  Current  Problem: Patient is on Suboxone maintenance.  Goal: Be able to manage mild to moderate withdrawal symptoms.  Intervention: Report to nurse any increase in withdrawal symptoms. Take medications as prescribed and follow up with addiction medicine appointments while in treatment. Ongoing thru 5/1/20 RYAN           Biomedical Conditions and Complaints     DIMENSION 2  RISK FACTOR: 0             AssignmentDate Source Prob/Goal/  Intervention Target  Date Initials Outcome Completion  Date   4/10/20 Self -  Current and Assessment -  Current  Problem: None  Goal: None  Intervention: None N/A TE         -----------------------------------------------------------------------------------------------------------------    Emotional/Behavioral/Cognitive Conditions and Complications     DIMENSION 3  RISK FACTOR: 1             AssignmentDate Source Prob/Goal/  Intervention Target  Date Initials Outcome Completion  Date   4/10/20 Self -  Current and Assessment -  Current  Problem: Reports feelings of depression due to recent relapse.     Goal: Manage  "mental health  Intervention:  Meet with  staff psychotherapist weekly while at  Ongoing thru 5/1/20 TE Eff 4/16     4/10/20 Self -  Current and Assessment -  Current  Problem: Pt lacks sober coping skills and impulse control  Goal: Increase sober coping skills and impulse control  Intervention: Utilizing the \"Starting a Meditation Practice\" handout, practice 10 minutes per day of mindfulness meditation while at . Ongoing thru 5/1/20 TE Eff 4/11-4/22       -------------------------------------------------------------------------------------------------------------------     Readiness to Change     DIMENSION 4  RISK FACTOR: 2             AssignmentDate Source Prob/Goal/  Intervention Target  Date Initials Outcome Completion  Date   4/10/20 Self -  Current and Assessment -  Current  Problem: {Pt has continued to use substances despite loss of control and mounting negative consequences, such as homelessness, unemployment, and fractured family relationships  Goal: Examine the impact of your addiction  Intervention: Complete the \"First Step Assignment\" and share in group 4/13/20 TE Eff 4/13     4/10/20 Self -  Current and Assessment -  Current  Problem: {Pt would benefit from increasing understanding of the disease of addiction  Goal: Understand the disease of addiction  Intervention: Read \"The Twofold Disease of Alcoholism.\" Write a 2 page reflection describing what is meant by the \"physical allergy\" and \"mental obsession.\" Give examples of each of these from your own using. 4/15/20 TE  Def      4/10/20 Self -  Current and Assessment -  Current  Problem: {Pt would benefit from connecting with spirituality and hope  Goal: Connect with your spirituality.  Intervention: Complete the \"Step Two\" packet. Share notable insights in group. Turn in packet to counselor. 4/23/20 TE  Def        -------------------------------------------------------------------------------------------------------------------   " "  Relapse/Continues Use/Continues Problem Potential     DIMENSION 5  RISK FACTOR: 4                 AssignmentDate Source Prob/Goal/  Intervention Target  Date Initials Outcome Completion  Date   4/10/20 Self -  Current, History -  Current and Assessment -  Current  Problem: Pt has unhealthy relating styles with self, others, and a higher power, which increase stress and pain and increase relapse risk  Goal: Examine unhealthy relationship styles  Intervention: Read \"Chapter 1 Beginning Our Journey\" from [employer] literature. Write a 2 page reflection and share in group. 4/17/20 TE Def      4/10/20 Self -  Current, History -  Current and Assessment -  Current  Problem: Pt has issues of shame that contribute to self-sabotage and relapse  Goal: Understand the source of your trouble  Intervention: Read \"Chapter 2 Our Spiritual Dilemma.\" Write a 2 page reflection and share in group. 4/20/20 TE Def      4/10/20 Self -  Current, History -  Current and Assessment -  Current  Problem: History of relapse and self-sabotage.   Goal: Understand how self-will contributes to relapse  Intervention: Complete the \"Third Step\" packet. Share notable insights in group. Share with counselor 4/27/20 TE Def      4/10/20 Self -  Current, History -  Current and Assessment -  Current  Problem: Client/Patient does not recognize relapse triggers and warning signs.     Goal: Identify and understand personal relapse and self-sabotage patterns.  Intervention: Complete \"An Attitude Inventory for those who relapse quickly after treatment.\" Share notable insights in group. Turn in to counselor. 4/30/20 TE Def        Recovery Environment     DIMENSION 6  RISK FACTOR: 4                 AssignmentDate Source Prob/Goal/  Intervention Target  Date Initials Outcome Completion  Date   4/10/20 Self -  Current and Assessment -  Current  Problem: Patient is homeless.      Goal: Find sober housing and supportive programming for aftercare  Intervention: Meet with [facility] " "staff  to explore available IOP programs and sober housing and secure placement. Ongoing thru 5/1/20 TE Eff 4/11-5/22     4/10/20 Self -  Current, History -  Current and Assessment -  Current  Problem: Lacks sober support network.     Goal: Develop a sober support network.  Intervention: Attend 5 12 step meetings per week while in [facility] treatment. Attend all scheduled lectures and groups. Ongoing thru 5/1/20 TE Def        Individual abuse prevention plan (required for lodging plus) : specific actions, referral:   No additional protection measures required other than the Program Abuse Prevention Plan - No additional measures required    All interventions that are designated as \"current\" will need to be completed in order to transition out of treatment with a favorable prognosis. The treatment plan is a fluid document and a work in progress. Interventions and goals may be added at any time to customize the plan to each individual's needs. Patients may work with counselors to change interventions as long as they pertain to the goals stipulated in the plan and/or are clinically driven.      "

## 2020-04-11 ENCOUNTER — HOSPITAL ENCOUNTER (OUTPATIENT)
Dept: BEHAVIORAL HEALTH | Facility: CLINIC | Age: 30
End: 2020-04-11
Attending: FAMILY MEDICINE
Payer: COMMERCIAL

## 2020-04-11 VITALS — TEMPERATURE: 97.8 F

## 2020-04-11 PROCEDURE — 10020000 ZZH LODGING PLUS FACILITY CHARGE ADULT

## 2020-04-11 PROCEDURE — H2035 A/D TX PROGRAM, PER HOUR: HCPCS

## 2020-04-11 PROCEDURE — H2035 A/D TX PROGRAM, PER HOUR: HCPCS | Mod: HQ

## 2020-04-11 NOTE — PROGRESS NOTES
Name: Bebeto Alvarado  Date: 4/11/2020  Medical Record: [id number]    Envelope Number: [id number]    List of Contents (List each item separately in new row):     4 suboxone 2-0.5 mg tabs    Admission:  I am responsible for any personal items that are not sent to the safe or pharmacy.  [facility] is not responsible for loss, theft or damage of any property in my possession.      Patient Signature:  ___________________________________________       Date/Time:__________________________    Staff Signature: __________________________________       Date/Time:__________________________    2nd Staff person, if patient is unable/unwilling to sign:      __________________________________________________________       Date/Time: __________________________      Discharge:  [facility] has returned all of my personal belongings:    Patient Signature: ________________________________________     Date/Time: ____________________________________    Staff Signature: ______________________________________     Date/Time:_____________________________________

## 2020-04-12 ENCOUNTER — HOSPITAL ENCOUNTER (OUTPATIENT)
Dept: BEHAVIORAL HEALTH | Facility: CLINIC | Age: 30
End: 2020-04-12
Attending: FAMILY MEDICINE
Payer: COMMERCIAL

## 2020-04-12 VITALS — TEMPERATURE: 98.4 F

## 2020-04-12 PROCEDURE — 10020000 ZZH LODGING PLUS FACILITY CHARGE ADULT

## 2020-04-12 PROCEDURE — H2035 A/D TX PROGRAM, PER HOUR: HCPCS | Mod: HQ

## 2020-04-13 ENCOUNTER — HOSPITAL ENCOUNTER (OUTPATIENT)
Dept: BEHAVIORAL HEALTH | Facility: CLINIC | Age: 30
End: 2020-04-13
Attending: FAMILY MEDICINE
Payer: COMMERCIAL

## 2020-04-13 VITALS — TEMPERATURE: 98.9 F

## 2020-04-13 PROCEDURE — 10020000 ZZH LODGING PLUS FACILITY CHARGE ADULT

## 2020-04-13 PROCEDURE — H2035 A/D TX PROGRAM, PER HOUR: HCPCS | Mod: HQ

## 2020-04-13 NOTE — PROGRESS NOTES
"[facility] ADDICTION MEDICINE  TELEPHONE Progress Note                                                      SUBJECTIVE                                                    Bebeto Alvarado is a 29 year old male who is being evaluated via a billable TELEPHONE visit due to COVID-19 pandemic in order to reduce potential unnecessary exposure to the virus.         The patient has been notified of following:     \"This telephone visit will be conducted via a call between you and your physician/provider. We have found that certain health care needs can be provided without the need for a physical exam.  This service lets us provide the care you need with a short phone conversation.  If a prescription is necessary we can send it directly to your pharmacy.  If lab work is needed we can place an order for that and you can then stop by our lab to have the test done at a later time.    If during the course of the call the physician/provider feels a telephone visit is not appropriate, you will not be charged for this service.\"     Bebeto Alvarado complains of    Chief Complaint   Patient presents with     Telephone     recheck       I have reviewed and updated the patient's Past Medical History, Social History, Family History and Medication List.    ALLERGIES  Codeine    Above verified with patient.     Date of last visit:  4/8/2020  BUPRENORPHINE FOLLOW UP: current dose suboxone 2-0.5 mg tablets;  Take 2 tablets by mouth BID.     Primary Care Provider: NEEL Tariq CNP   [place] Board of Pharmacy Data Base Reviewed:    Yes; reviewed and no concerns for diversionary activity.  Most recent data includes:  04/08/2020   1     04/07/2020   Buprenorphn-Naloxn 2-0.5 Mg Sl         28.00  7  Kr Calli      Brief History:    Initial visit with me on 11/20/2019  Recreational use of mushrooms and marijuana as a teen.  At 18 started using oxycontin orally at parties then started smoking heroin at age 20. Around age " [age] started using heroin daily to avoid withdrawal symptoms.  Detox and treatment at [facility] in 10/2018 for one week but asked to leave as he was accused of buying drugs on the campus.  Started using heroin IV in 06/2019 1/2 to 1 gram daily.  Detox admission 11/15/2019 -11/19/2019 and went to [facility] once a bed was available.  Graduated and transitioned to [facility] on 12/30/2019 then kicked out for 1 time heroin relapse and moved to [facility] for continued  treatment.      Tobacco use - reducing, 5 cigarettes daily and wants to quit with support from nicotine gum and nicotine patches and wellbutrin     Hepatitis C Status - Nonreactive 11/14/2019               HIV status - Nonreactive 11/14/2019     Date of last use:   Heroin - 03/08/2020 IV use; relapse again on 4/9/2020 once non-IV    HPI:    4/14/2020  Patient status since last visit: decompensated    Tavo was admitted to [facility] on 4/10/2020 for relapse on opioids.  He reports using heroin once on 4/9/2020 but denies IV use since 3/8/2020.  He is embarrassed about relapse and reports the isolation from COVID on top of further self-isolation resulted in increased apathy about sobriety.  He sought heroin from dealer on the street.  In the past had relapsed when he found heroin in his car.     Discussed importance of connection to others and interrupting warning signs for relapse such as social isolation.  He felt he was just starting to connect with others at the treatment program at [facility] but didn't connect with his counselor.  He also felt there was little accountability which made him even less motivated to adhere to assignments.  He feels the accountability at  is much greater and feels supported by their staff.     He is scheduled to receive his first sublocade dose of 100 mg monthly injection starting tomorrow at [address].  Reviewed sublocade injection and instructions for taking his oral  suboxone prn the first 2 days after injection in case of withdrawal symptoms.       Cravings: Denies     MAT Dose: adequate when taking     Side Effects: none.      Cues to use and relapse triggers: None      Social History:   Reviewed today.  See HPI for changes since last visit.     Problem list and histories reviewed & adjusted, as indicated.  Additional history: as documented    Patient Active Problem List    Diagnosis Date Noted     Chemical dependency (H) 04/10/2020     Priority: Medium     Opioid use disorder, moderate, dependence (H) 03/10/2020     Priority: Medium     History of heroin abuse (H) 12/31/2019     Priority: Medium     Opioid dependence, uncomplicated (H) 12/01/2019     Priority: Medium     Attention deficit disorder 12/15/2004     Priority: Medium     Epic       Cannabis abuse 12/15/2004     Priority: Medium     Epic         Current Medications:  acetaminophen (TYLENOL) 325 MG tablet, Take 650 mg by mouth every 4 hours as needed for mild pain  alum & mag hydroxide-simethicone (MAALOX) 200-200-20 MG/5ML SUSP suspension, Take 30 mLs by mouth every 6 hours as needed for indigestion  buprenorphine-naloxone (SUBOXONE) 2-0.5 MG SUBL sublingual tablet, Place 2 tablets under the tongue 2 times daily  buPROPion HCl ER, XL, 450 MG TB24, Take 450 mg by mouth daily Take 1 tablet (450mg) by mouth every morning.  guaiFENesin (ROBITUSSIN) 20 mg/mL SOLN solution, Take 10 mLs by mouth every 4 hours as needed for cough  ibuprofen (ADVIL/MOTRIN) 200 MG tablet, Take 400 mg by mouth every 6 hours as needed for mild pain  loratadine (CLARITIN) 10 MG tablet, Take 10 mg by mouth daily as needed for allergies  melatonin 3 MG tablet, Take 3 mg by mouth nightly as needed for sleep  naloxone (NARCAN) 4 MG/0.1ML nasal spray, Spray 1 spray (4 mg) into one nostril alternating nostrils as needed for opioid reversal every 2-3 minutes until assistance arrives  nicotine (NICORETTE) 2 MG gum, Place 1-2 each (2-4 mg) inside cheek  as needed for smoking cessation  phenol-menthol (CEPASTAT) 14.5 MG lozenge, Place 1 lozenge inside cheek every 2 hours as needed for moderate pain  senna-docusate (SENOKOT-S/PERICOLACE) 8.6-50 MG tablet, Take 2 tablets by mouth daily as needed for constipation  traZODone (DESYREL) 50 MG tablet, Take 1 tablet (50 mg) by mouth nightly as needed for sleep    Self Administer Medications: Behavioral Services        OBJECTIVE                                                    LABS:  Labs reviewed. No UDS collected as patient evaluated over TELEPHONE visit.     MENTAL STATUS EXAM:  Appearance:  not assessed - telephone visit   Attitude:  cooperative  Mood:  better  Affect: not assessed - telephone visit   Psychomotor Behavior: not assessed - telephone visit   Thought Content:  no evidence of suicidal ideation or homicidal ideation, no evidence of psychotic thought, no auditory hallucinations present and no visual hallucinations present  Insight:  fair  Judgement:  fair  Oriented to:  time, person, and place    ASSESSMENT/PLAN                                                      (F11.20) Opioid use disorder, moderate, dependence (H)  (primary encounter diagnosis)  Comment: No opioid use since 03/08/2020.  Denies side effects or cravings when taking Suboxone.  UDS completed randomly at his sober house.  Significant concern for relapse given history of relapse.  Increased apathy at this time.  Will continue with close follow up via video visits and additional accountability on homework assignments.   Plan: Start sublocade injection 100 mg monthly injection scheduled for 1st dose tomorrow.  Continue buprenorphine HCl-naloxone HCl (SUBOXONE) 2-0.5 MG SL tablets; Place 2 tablets under the tongue twice daily until sublocade injection received.  Patient to use home supply as he has enough to last.  May take suboxone oral tablets as ordered up to 2 days after sublocade injection.  Sent message to [facility] RN to discontinue oral 2 days  after injection and to reach out to writer with any concerns for withdrawal after that.      - Continue [facility] residential CD treatment and continue working on reducing social isolation and making healthy connections with others.      (F33.1) Moderate episode of recurrent major depressive disorder (H)  Comment: Mood improved with increased structure and new routine as well as improvement in weather.  Patient to work on reducing isolation.  Plan: Continue Wellbutrin  mg tablets, take 1 tablet by mouth once daily.     (F17.200) Tobacco use disorder  Comment: Smoking prior to [facility]. Using nicotine gum.    Plan: Continue taking wellbutrin as above and nicotine 2 mg gum 1-2 pieces every hour as needed for tobacco cessation.      (Z79.479) High risk medication use  Comment: Suboxone  Plan: UDS, monitor for return to use and cravings      ENCOUNTER FOR LONG TERM USE OF HIGH RISK MEDICATION   High Risk Drug Monitoring?  YES   Drug being monitored: Buprenorphine   Reason for drug: Opioid use disorder   What is being monitored?: Dosage, Cravings, Trigger, side effects, and continued abstinence.      Follow-up: 3 weeks PRIOR to discharge from [facility].     Patient counseling completed today:  Counseled the patient on not isolating, being open, honest, and willing to change. Indications, benefits and potential side effects from sublocade reviewed today including risk of injection site pain.       Phone call contact time  Call Started at 2:01 PM  Call Ended at 2:18 PM    I have reviewed the note as documented above.  This accurately captures the substance of my conversation with the patient.      Jasmin Coppola, CNP  [facility] - Addiction Medicine  [facility]  [phone]

## 2020-04-14 ENCOUNTER — VIRTUAL VISIT (OUTPATIENT)
Dept: ADDICTION MEDICINE | Facility: CLINIC | Age: 30
End: 2020-04-14
Payer: COMMERCIAL

## 2020-04-14 ENCOUNTER — HOSPITAL ENCOUNTER (OUTPATIENT)
Dept: BEHAVIORAL HEALTH | Facility: CLINIC | Age: 30
End: 2020-04-14
Attending: FAMILY MEDICINE
Payer: COMMERCIAL

## 2020-04-14 VITALS — TEMPERATURE: 98.9 F

## 2020-04-14 DIAGNOSIS — Z79.899 HIGH RISK MEDICATION USE: ICD-10-CM

## 2020-04-14 DIAGNOSIS — F33.1 MODERATE EPISODE OF RECURRENT MAJOR DEPRESSIVE DISORDER (H): ICD-10-CM

## 2020-04-14 DIAGNOSIS — F17.200 TOBACCO USE DISORDER: ICD-10-CM

## 2020-04-14 DIAGNOSIS — F11.20 OPIOID USE DISORDER, MODERATE, DEPENDENCE (H): Primary | ICD-10-CM

## 2020-04-14 PROCEDURE — 99442 ZZC PHYSICIAN TELEPHONE EVALUATION 11-20 MIN: CPT | Performed by: NURSE PRACTITIONER

## 2020-04-14 PROCEDURE — 10020000 ZZH LODGING PLUS FACILITY CHARGE ADULT

## 2020-04-14 PROCEDURE — H2035 A/D TX PROGRAM, PER HOUR: HCPCS | Mod: HQ

## 2020-04-14 RX ORDER — BUPRENORPHINE HYDROCHLORIDE AND NALOXONE HYDROCHLORIDE DIHYDRATE 2; .5 MG/1; MG/1
2 TABLET SUBLINGUAL 2 TIMES DAILY
Qty: 28 TABLET | Refills: 0 | COMMUNITY
Start: 2020-04-14 | End: 2020-05-20

## 2020-04-14 NOTE — ADDENDUM NOTE
Encounter addended by: Ariel Greene on: 4/14/2020 6:13 AM   Actions taken: Charge Capture section accepted

## 2020-04-15 ENCOUNTER — HOSPITAL ENCOUNTER (OUTPATIENT)
Dept: BEHAVIORAL HEALTH | Facility: CLINIC | Age: 30
End: 2020-04-15
Attending: FAMILY MEDICINE
Payer: COMMERCIAL

## 2020-04-15 ENCOUNTER — INFUSION THERAPY VISIT (OUTPATIENT)
Dept: INFUSION THERAPY | Facility: CLINIC | Age: 30
End: 2020-04-15
Attending: NURSE PRACTITIONER
Payer: COMMERCIAL

## 2020-04-15 VITALS — TEMPERATURE: 99.1 F

## 2020-04-15 VITALS
HEART RATE: 84 BPM | RESPIRATION RATE: 16 BRPM | TEMPERATURE: 97.8 F | SYSTOLIC BLOOD PRESSURE: 120 MMHG | DIASTOLIC BLOOD PRESSURE: 72 MMHG | OXYGEN SATURATION: 99 %

## 2020-04-15 DIAGNOSIS — F11.20 OPIOID USE DISORDER, MODERATE, DEPENDENCE (H): Primary | ICD-10-CM

## 2020-04-15 PROCEDURE — 10020000 ZZH LODGING PLUS FACILITY CHARGE ADULT

## 2020-04-15 PROCEDURE — H2035 A/D TX PROGRAM, PER HOUR: HCPCS | Mod: HQ

## 2020-04-15 PROCEDURE — 25000128 H RX IP 250 OP 636: Mod: ZF | Performed by: NURSE PRACTITIONER

## 2020-04-15 PROCEDURE — 25000125 ZZHC RX 250: Mod: ZF | Performed by: NURSE PRACTITIONER

## 2020-04-15 PROCEDURE — 96372 THER/PROPH/DIAG INJ SC/IM: CPT

## 2020-04-15 RX ORDER — LIDOCAINE HYDROCHLORIDE 10 MG/ML
2 INJECTION, SOLUTION EPIDURAL; INFILTRATION; INTRACAUDAL; PERINEURAL ONCE
Status: CANCELLED | OUTPATIENT
Start: 2020-05-13

## 2020-04-15 RX ORDER — LIDOCAINE HYDROCHLORIDE 10 MG/ML
2 INJECTION, SOLUTION EPIDURAL; INFILTRATION; INTRACAUDAL; PERINEURAL ONCE
Status: COMPLETED | OUTPATIENT
Start: 2020-04-15 | End: 2020-04-15

## 2020-04-15 RX ADMIN — LIDOCAINE HYDROCHLORIDE 2 ML: 10 INJECTION, SOLUTION EPIDURAL; INFILTRATION; INTRACAUDAL; PERINEURAL at 14:21

## 2020-04-15 RX ADMIN — BUPRENORPHINE 100 MG: 100 SOLUTION SUBCUTANEOUS at 14:25

## 2020-04-15 NOTE — PROGRESS NOTES
Writer reaching out to Jasmin Coppola to see if on 4/17 pt's extra Suboxone should be discharged with pt or destroyed.  Jasmin requesting for extra medication to be destroyed (post Sublocade inj).  Note placed in pt MAR for this to be done

## 2020-04-15 NOTE — PROGRESS NOTES
Patient:  Bebeto Alvarado            Adult CD Progress Note and Treatment Plan Review     Attendance  Please refer to OP BEH CD Adult Attendance Record Documentation Flowsheet    Support group attended this week: yes    Reporting sobriety:  yes    Treatment Plan     Treatment Plan Review competed on: 4/15/20       Client preferred learning style: Visual  Hands on  Verbal    Staff Members contributing YOVANA Beckham, and YOVANA Kimball                     Received Supervision: No    Client: contributed to goals and plan.    Client received copy of plan/revised plan: Yes    Client agrees with plan/revised plan: Yes        Changes to Treatment Plan: No    New Goals added since last review This is the first review; all goals are new    Goals worked on since last review manage mild to moderate withdrawal symptoms, manage mental health, increase sober coping skills and impulse control, examine the impact of addiction, understand the disease of addiction, relapse prevention.    Strategies effective: yes    Strategies need these changes: none needed    1) Care Coordination Activities:  Pt has begun discussion of aftercare levels with counseling staff  2) Medical, Mental Health and other appointments the client attended: Pt met with  staff RN for health assessment. Pt met with addiction medicine for medication management.  3) Medication issues: None reported  4) Physical and mental health problems: None reported  5) Any changes in Vulnerable Adult Status?  No If yes, add to treatment plan and individual abuse prevention plan.  6) Review and evaluation of the individual abuse prevention plan: Current IAPP for this program is adequate for this client          ASAM Risk Rating:    Dimension 1 1 Pt is on Suboxone Maintenance    Dimension 2 0 No issue    Dimension 3 1 Pt is referred to meet with  staff psychotherapist to manage mental health and will meet with therapist weekly while in treatment. Pt worked to increase  "sober coping skills and impulse control by practicing 10 minutes of mindfulness meditation per day. Pt denies SI/SIB.    Dimension 4 2 Pt worked to examine the impact of addiction by completing his \"First Step Assignment\" and sharing in group. Pt worked to understand the disease of addiction by completing his \"Twofold Disease of Alcoholism\" assignment and sharing in group. Pt has been attending all scheduled groups and lectures and is participating adequately.    Dimension 5 4 Pt worked to recognize his personal relapse process, identify personal warning signs and triggers, and identify responses to relapse warning signs by participating in the weekend workshop on Relapse Prevention. Pt has several upcoming assignments around relapse prevention.    Dimension 6 4 Pt worked to build sober support by attending scheduled groups and activities and making sober connections with treatment peers. Pt worked to build sober support by attending 12 step meetings via Zoom. Pt worked to find sober housing and build an aftercare plan by discussing appropriate aftercare levels with counseling staff and meeting with  staff psychotherapist to explore available options. Pt needs to make aftercare and sober housing choices and secure placement with the assistance of  staff .      Guide to Risk Ratings for Suicidality:   IDEATION: Active thoughts of suicide? INTENT: Intent to follow on suicide? PLAN: Plan to follow through on suicide? Level of Risk:   IF Yes Yes Yes Patient = High Emergent   IF Yes Yes No Patient = High Urgent/Non-Emergent   IF Yes No No Patient = Moderate Non-Urgent   IF No No   No Patient = Low Risk   The patient's ADDITIONAL RISK FACTORS and lack of PROTECTIVE FACTORS may increase their overall suicide risk ratings.     Patient's/client's current risk rating:  Low Risk    Family Involvement:   none schedule this week    Data:   offered feedback client did participate  Poor insight    Intervention: "   Aftercare planning  Behavior modification  Cognitive Behavioral Therapy  Counselor feedback  Education  Emotional management  Group feedback  Motivational Enhancement Therapy  Relapse prevention  Client & counselor reviewed and signed ISP & assessment summary      Assessment:   Stages of Change Model  Contemplation    Appears/Sounds:  Cooperative  Ambivalent      Plan:  Next treatment task referral out  Focus on recovery environment  Monitor emotional/physical health    YOVANA Aaron

## 2020-04-15 NOTE — PROGRESS NOTES
Nursing Note  Bebeto Alvarado presents today to [facility] for:   Chief Complaint   Patient presents with     Imm/Inj     Sublocade     During today's [facility] appointment, orders from Jasmin Coppola CNP were completed.  Frequency: monthly, with at least 26 days between doses.    Progress note:  Patient identification verified by name and date of birth.  Assessment completed.  Vitals recorded in Doc Flowsheets.  Patient was provided with education regarding medication/procedure and possible side effects.  Patient verbalized understanding.     present during visit today: Not Applicable.    Treatment Conditions: Patient's first dose. Has been on suboxone for greater than 7 days.     Premedications: lidocaine administered prior to sublocade per order    Drug Waste Record: No    Given via subQ injection in left upper quadrant    Labs: were not ordered for this appointment.    Vascular access: not required     Post Infusion Assessment:  Patient tolerated infusion without incident. Printed AVS, discharge instructions reviewed with patient. Denied further questions. Will call to set up future appointments. Discharged from [facility] in stable condition to treatment facility.     Discharge Plan:   Follow up plan of care with: ongoing infusions at [facility]. and after visit summary given to patient  Discharge instructions were reviewed with patient.  Patient/representative verbalized understanding of discharge instructions and all questions answered.  Patient discharged from [facility] in stable condition.    Neema Constantino RN       Administrations This Visit     buprenorphine ER (SUBLOCADE) syringe 100 mg     Admin Date  04/15/2020 Action  Given Dose  100 mg Route  Subcutaneous Administered By  Neema Constantino RN          lidocaine (PF) (XYLOCAINE) 1 % injection 2 mL     Admin  Date  04/15/2020 Action  Given Dose  2 mL Route  Subcutaneous Administered By  Neema Constantino, RN                /72   Pulse 84   Temp 97.8  F (36.6  C) (Oral)   Resp 16   SpO2 99%

## 2020-04-15 NOTE — PATIENT INSTRUCTIONS
Dear Bebeto Alvarado    Thank you for choosing [facility] ([facility]) for your Sublocade Injection.  The following information is a summary of our appointment as well as important reminders.      Patient Education     Buprenorphine extended-release subcutaneous injection  Brand Name: Sublocade  What is this medicine?  BUPRENORPHINE (byoo pre NOR feen) is used to treat certain types of drug dependence.  How should I use this medicine?  This medicine is for injection under the skin. It is only given by a health care professional in a hospital or clinic setting.  A special MedGuide will be given to you by the pharmacist with each injection. Be sure to read this information carefully each time.  Talk to your pediatrician regarding the use of this medicine in children. Special care may be needed  What side effects may I notice from receiving this medicine?  Side effects that you should report to your doctor or health care professional as soon as possible:    allergic reactions like skin rash, itching or hives, swelling of the face, lips, or tongue    breathing problems    confusion    pain, redness, or irritation at site where injected    signs and symptoms of a dangerous change in heartbeat or heart rhythm like chest pain; dizziness; fast or irregular heartbeat; palpitations; feeling faint or lightheaded, falls; breathing problems    signs and symptoms of liver injury like dark yellow or brown urine; general ill feeling or flu-like symptoms; light-colored stools; loss of appetite; nausea; right upper belly pain; unusually weak or tired; yellow of the eyes or skin    signs and symptoms of low blood pressure like dizziness; feeling faint or lightheaded, falls; unusually weak or tired    trouble passing urine or change in the amount of urine  Side effects that usually do not require medical attention (report these to your doctor or health care professional if  they continue or are bothersome):    constipation    dry mouth    headache    itching at the injection site    nausea    tiredness  What may interact with this medicine?  Do not take this medication with any of the following medicines:    cisapride    certain medicines for fungal infections like ketoconazole and itraconazole    dofetilide    dronedarone    pimozide    ritonavir    thioridazine  This medicine may interact with the following medications:    alcohol    antihistamines for allergy, cough and cold    antiviral medicines for HIV or AIDS    atropine    certain antibiotics like clarithromycin, erythromycin, linezolid, rifampin    certain medicines for anxiety or sleep    certain medicines for bladder problems like oxybutynin, tolterodine    certain medicines for depression like amitriptyline, fluoxetine, sertraline    certain medicines for migraine headache like almotriptan, eletriptan, frovatriptan, naratriptan, rizatriptan, sumatriptan, zolmitriptan    certain medicines for nausea or vomiting like dolasetron, ondansetron, palonosetron    certain medicines for Parkinson's disease like benztropine, trihexyphenidyl    certain medicines for seizures like phenobarbital, primidone    certain medicines for stomach problems like cimetidine, dicyclomine, hyoscyamine    certain medicines for travel sickness like scopolamine    diuretics    general anesthetics like halothane, isoflurane, methoxyflurane, propofol    ipratropium    local anesthetics like lidocaine, pramoxine, tetracaine    MAOIs like Carbex, Eldepryl, Marplan, Nardil, and Parnate    medicines that relax muscles for surgery    methylene blue    other medicines that prolong the QT interval (cause an abnormal heart rhythm)    other narcotic medicines for pain or cough    phenothiazines like chlorpromazine, mesoridazine, prochlorperazine, thioridazine  What if I miss a dose?  It is important not to miss your dose. Call your doctor or health care  professional if you are unable to keep an appointment.  Where should I keep my medicine?  This drug is given in a hospital or clinic and will not be stored at home.  What should I tell my health care provider before I take this medicine?  They need to know if you have any of these conditions:    Byron's disease    adrenal gland problems    blockage in your bowel    brain tumor    drink more than 3 alcohol-containing drinks per day    head injury    heart disease    gallbladder disease    liver disease    lung or breathing disease    mental illness    problems urinating    prostate disease    thyroid disease    an unusual or allergic reaction to buprenorphine, other medicines, foods, dyes, or preservatives    pregnant or trying to get pregnant    breast-feeding  What should I watch for while using this medicine?  Your condition will be monitored carefully while you are receiving this medicine. Do not stop taking except on your doctor's advice because you may develop a severe reaction. Your doctor will tell you how much medicine is needed and how long you will need treatment.  You may have a lump for several weeks in the area where the medicine was injected which will decrease in size over time. Do not tamper with or try to remove the medicine from under your skin. Do not rub or massage the injection site. Be aware of the placement of belts and clothing waistbands.  If you are also taking a narcotic medicine for pain or cough or another medicine that also causes drowsiness, you may have more side effects. Give your health care provider a list of all medicines you use. Your doctor will tell you how much medicine to take. Do not take more medicine than directed. Call emergency for help if you have problems breathing or unusual sleepiness.  You may get drowsy or dizzy. Do not drive, use machinery, or do anything that needs mental alertness until you know how this medicine affects you. Do not stand or sit up quickly,  especially if you are an older patient. This reduces the risk of dizzy or fainting spells. Alcohol may interfere with the effect of this medicine. Avoid alcoholic drinks.  Wear a medical ID bracelet or chain, and carry a card that describes your disease and details of your medicine and dosage regimens.  This medicine may cause constipation. Try to have a bowel movement at least every 2 to 3 days. If you do not have a bowel movement for 3 days, call your doctor or health care professional.  Your mouth may get dry. Chewing sugarless gum or sucking hard candy, and drinking plenty of water may help. Contact your doctor if the problem does not go away or is severe.  When treatment with this medicine is stopped, you can have symptoms of opiate withdrawal including: shaking, sweating more than normal, feeling hot or cold more than normal, runny nose, watery eyes, goose bumps, diarrhea, vomiting, and muscle aches. Tell your healthcare provider if you develop any of these symptoms.  In an emergency, have a family member or friend tell the emergency room staff that you are physically dependent on an opiate and are being treated with an opiate injection.  NOTE:This sheet is a summary. It may not cover all possible information. If you have questions about this medicine, talk to your doctor, pharmacist, or health care provider. Copyright  2019 [employer]             We look forward in seeing you on your next appointment here at [facility] ([facility]).  Please don t hesitate to call us at [phone] to reschedule any of your appointments or to speak with one of the [facility] registered nurses.  It was a pleasure taking care of you today.    Sincerely,    [facility]  [facility]  [address] [place] [address]  [address]  Phone:  [phone]

## 2020-04-16 ENCOUNTER — TELEPHONE (OUTPATIENT)
Dept: OPHTHALMOLOGY | Facility: CLINIC | Age: 30
End: 2020-04-16

## 2020-04-16 ENCOUNTER — HOSPITAL ENCOUNTER (OUTPATIENT)
Dept: BEHAVIORAL HEALTH | Facility: CLINIC | Age: 30
End: 2020-04-16
Attending: FAMILY MEDICINE
Payer: COMMERCIAL

## 2020-04-16 VITALS — TEMPERATURE: 98.8 F

## 2020-04-16 PROCEDURE — 10020000 ZZH LODGING PLUS FACILITY CHARGE ADULT

## 2020-04-16 PROCEDURE — H2035 A/D TX PROGRAM, PER HOUR: HCPCS | Mod: HQ

## 2020-04-16 PROCEDURE — H2035 A/D TX PROGRAM, PER HOUR: HCPCS

## 2020-04-16 NOTE — PROGRESS NOTES
Integrative Therapies: Essential Oils     Patient requesting essential oil inhaler to manage (Mood/Mental Health/Physical/Spiritual symptoms).   Discussed appropriate use of essential oil inhalers and instructed patient not to leave labeled product out on unit.   Patient was screened for kidney disease, asthma/reactive airway disease and rashes and wounds or 1st trimester of pregnancy  List Essential Oils requested by pt : Coty Wu  Patient verbalized and demonstrated understanding of how to use essential oil inhaler correctly and will notify LP RN with any concerns or side effects. Patient agrees not to share their essential oil inhaler with other clients.  Continue to support the patient in safely utilizing integrative therapies as able to manage symptoms during treatment.     SARA Pace

## 2020-04-16 NOTE — TELEPHONE ENCOUNTER
[facility] Call Center    Phone Message    May a detailed message be left on voicemail: yes     Reason for Call: Symptoms or Concerns     If patient has red-flag symptoms, warm transfer to triage line    Current symptom or concern: pt stated his left eye has been throbbing, and twitching non stop, please call pt thanks    Symptoms have been present for: a few days    Has patient previously been seen for this? No    By : n/a    Date: n/a    Are there any new or worsening symptoms? Yes: new and worsening      Action Taken: Message routed to:  [facility] ([facility] eye    Travel Screening: Not Applicable

## 2020-04-16 NOTE — TELEPHONE ENCOUNTER
I left detailed message that patient needs to call back to schedule either telephone or video visit for further evaluation. Openings available tomorrow.   Aby Gómez, EMT at 3:53 PM on 4/16/2020.
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
## 2020-04-17 ENCOUNTER — HOSPITAL ENCOUNTER (OUTPATIENT)
Dept: BEHAVIORAL HEALTH | Facility: CLINIC | Age: 30
End: 2020-04-17
Attending: FAMILY MEDICINE
Payer: COMMERCIAL

## 2020-04-17 ENCOUNTER — VIRTUAL VISIT (OUTPATIENT)
Dept: OPHTHALMOLOGY | Facility: CLINIC | Age: 30
End: 2020-04-17
Attending: OPTOMETRIST
Payer: COMMERCIAL

## 2020-04-17 VITALS — TEMPERATURE: 98.8 F

## 2020-04-17 DIAGNOSIS — H53.022 REFRACTIVE AMBLYOPIA OF LEFT EYE: ICD-10-CM

## 2020-04-17 DIAGNOSIS — G51.4 OCULAR MYOKYMIA: Primary | ICD-10-CM

## 2020-04-17 PROCEDURE — H2035 A/D TX PROGRAM, PER HOUR: HCPCS | Mod: HQ

## 2020-04-17 PROCEDURE — 10020000 ZZH LODGING PLUS FACILITY CHARGE ADULT

## 2020-04-17 NOTE — TELEPHONE ENCOUNTER
[facility] Call [facility]    Phone Message    May a detailed message be left on voicemail: yes     Reason for Call: Other: Pt calling back he has phone appt and nobody has called him, If not called in next 10 minutes he would like to discuss rescheduling    Thank you,    Action Taken: Message routed to:  [facility] ([facility] eye    Travel Screening: Not Applicable

## 2020-04-17 NOTE — PROGRESS NOTES
Pt reported eye twitching that has been bothering him over the last 2 months ( see note from Optometry 4/17/20. He requested to go to an offsite optometry appt today, per COVID-19 restrictions pt was asked to schedule appt after discharging from program. Instead Pt scheduled in person appt for today at [address] [place] [address]. Pt approached writer and said he would be leaving by cab to said appt. Writer reminded pt he had been asked to schedule visit after discharge. Pt appeared frustrated with this, but agreed to call and cancel his appt and reschedule it. When pt called back to reschedule the clinic was able to complete a phone visit with him.

## 2020-04-17 NOTE — PROGRESS NOTES
HPI:  This is a telephone call only/telemedicine visit (due to covid-19 precautions). Spent 12 minutes on the phone.     The left eye complains of twitching of left lower lid for 2 months now. Nothing makes the eyes better or worst. Each episode lasts for an hour. He went to [facility] one month ago and was given glasses. Patient was told to wear it full time. There is a history of amblyopia in the left eye - tried eye patching. His eyes were not dilated - he deferred it. There is no change in his vision.       [id number] visit      Pertinent Medical History:    Opiod dependence.     Ocular History:    Amblyopia left eye (refractive amblyopia), OS.      Eye Medications:    None    Assessment and Plan:  1.   Myokymia, left eye.     Self limiting and should resolve in a few weeks.     Recommend comfort treatment. Try tonic water. Start artificial tears 4 times daily both eyes for comfort.     Patient is to be seen if there is any decrease in vision, eye pain, or redness. Otherwise okay to seen in a few months for a dilated eye exam.     2.   Amblyopia, left eye.     Tried eye patching as a kid. Likely refractive as patient mentioned high prescription in the left eye.     Monocular precautions. Recommends polycarbonate lenses. Patient was seen for a full eye exam 1-2 months ago at [facility]. At that time, patient defers dilation. Recommends dilation in the summer/fall 2020. We discussed risks and benefits of a dilated eye exam.         Medical History:  Past Medical History:   Diagnosis Date     Opiate abuse, continuous (H)      Tobacco use        Medications:  Current Outpatient Medications   Medication Sig Dispense Refill     acetaminophen (TYLENOL) 325 MG tablet Take 650 mg by mouth every 4 hours as needed for mild pain       alum & mag hydroxide-simethicone (MAALOX) 200-200-20 MG/5ML SUSP suspension Take 30 mLs by mouth every 6 hours as needed for indigestion       buprenorphine-naloxone (SUBOXONE) 2-0.5 MG SUBL  sublingual tablet Place 2 tablets under the tongue 2 times daily 28 tablet 0     buPROPion HCl ER, XL, 450 MG TB24 Take 450 mg by mouth daily Take 1 tablet (450mg) by mouth every morning. 30 tablet 1     guaiFENesin (ROBITUSSIN) 20 mg/mL SOLN solution Take 10 mLs by mouth every 4 hours as needed for cough       ibuprofen (ADVIL/MOTRIN) 200 MG tablet Take 400 mg by mouth every 6 hours as needed for mild pain       loratadine (CLARITIN) 10 MG tablet Take 10 mg by mouth daily as needed for allergies       melatonin 3 MG tablet Take 3 mg by mouth nightly as needed for sleep       naloxone (NARCAN) 4 MG/0.1ML nasal spray Spray 1 spray (4 mg) into one nostril alternating nostrils as needed for opioid reversal every 2-3 minutes until assistance arrives 0.2 mL 3     nicotine (NICORETTE) 2 MG gum Place 1-2 each (2-4 mg) inside cheek as needed for smoking cessation 110 tablet 4     phenol-menthol (CEPASTAT) 14.5 MG lozenge Place 1 lozenge inside cheek every 2 hours as needed for moderate pain       senna-docusate (SENOKOT-S/PERICOLACE) 8.6-50 MG tablet Take 2 tablets by mouth daily as needed for constipation       traZODone (DESYREL) 50 MG tablet Take 1 tablet (50 mg) by mouth nightly as needed for sleep 60 tablet 1

## 2020-04-17 NOTE — TELEPHONE ENCOUNTER
Pt is still waiting for Dr Peralta.  Had 10:30 appt which he has not been called for.  In 5 minutes if he hasn't received a call you may call him back to reschedule.
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
## 2020-04-18 ENCOUNTER — HOSPITAL ENCOUNTER (OUTPATIENT)
Dept: BEHAVIORAL HEALTH | Facility: CLINIC | Age: 30
End: 2020-04-18
Attending: FAMILY MEDICINE
Payer: COMMERCIAL

## 2020-04-18 VITALS — TEMPERATURE: 97.4 F

## 2020-04-18 PROCEDURE — H2035 A/D TX PROGRAM, PER HOUR: HCPCS | Mod: HQ

## 2020-04-18 PROCEDURE — 10020000 ZZH LODGING PLUS FACILITY CHARGE ADULT

## 2020-04-19 ENCOUNTER — HOSPITAL ENCOUNTER (OUTPATIENT)
Dept: BEHAVIORAL HEALTH | Facility: CLINIC | Age: 30
End: 2020-04-19
Attending: FAMILY MEDICINE
Payer: COMMERCIAL

## 2020-04-19 VITALS — TEMPERATURE: 98.9 F

## 2020-04-19 PROCEDURE — 10020000 ZZH LODGING PLUS FACILITY CHARGE ADULT

## 2020-04-19 PROCEDURE — H2035 A/D TX PROGRAM, PER HOUR: HCPCS | Mod: HQ

## 2020-04-19 NOTE — PROGRESS NOTES
Patient reports that he intends to attend [facility] outpatient services while living in a sober house affiliated with their [facility]   Program. Patient requested that required documentation be forwarded to [facility] so that an intake/orientation appointment can be scheduled. This request was completed on 4/19/20.

## 2020-04-20 ENCOUNTER — HOSPITAL ENCOUNTER (OUTPATIENT)
Dept: BEHAVIORAL HEALTH | Facility: CLINIC | Age: 30
End: 2020-04-20
Attending: FAMILY MEDICINE
Payer: COMMERCIAL

## 2020-04-20 VITALS — TEMPERATURE: 98.4 F

## 2020-04-20 PROCEDURE — H2035 A/D TX PROGRAM, PER HOUR: HCPCS | Mod: HQ

## 2020-04-20 PROCEDURE — 10020000 ZZH LODGING PLUS FACILITY CHARGE ADULT

## 2020-04-20 NOTE — PROGRESS NOTES
"        Adult CD Progress Note and Treatment Plan Review     Attendance  Please refer to OP BEH CD Adult Attendance Record Documentation Flowsheet    Support group attended this week: yes    Reporting sobriety:  yes    Treatment Plan     Treatment Plan Review competed on: 4/20/2020      Client preferred learning style: Hands on  Verbal    Staff Members contributing: Misael Sawant, Lead Counselor, [facility]                     Received Supervision: No    Client: contributed to goals and plan.    Client received copy of plan/revised plan: N/A    Client agrees with plan/revised plan: Yes    Changes to Treatment Plan: No    New Goals added since last review: None      Goals worked on since last review: See below Dimensions/risk ratings.    Strategies effective: yes    Strategies need these changes: None    ASAM Risk Rating:    Dimension 1 1 No changes    Dimension 2 0 No changes    Dimension 3 1 Pt.reports he has had no suicidal ideation. Pt.continues to work on gaining insight into how his addiction has affected his mental health. Pt.needs to identify sober coping skills. He has started meditation.    Dimension 4 2 Pt.has been attending all lectures and groups. He presented his first step assignment in group. He continues to lack insight and acceptance of the first step Pt.needs to increase his knowledge of the disease of addiction.    Dimension 5 4 Pt.attended the relapse prevention work group. He needs to work on identifying his relapse warning signs and triggers in the areas of people,places,activities and feelings. Pt.lacks a relapse prevention plan. Pt.shared his assignment on \"Chapter 1 Beginning Our Journey\" he received feedback from counselor and peers. Pt.is working on his \"Third Step\" assignment.      Dimension 6 4 Pt.wants to attend [facility] treatment program and live at a sober house. Pt.never has had a sponsor. He needs to become an active participant in his recovery.    Any changes in " Vulnerable Adult Status?  No  If yes, add to treatment plan and individual abuse prevention plan.    Family Involvement:   ANAIS signed    Data:   offered feedback, good insight, client did actively participate  Patient continues to be active in group therapy.      Intervention:   Counselor feedback  Group feedback    Assessment:   Stages of Change Model  Action    Appears/Sounds:  Cooperative  Motivated  Engaged      Plan:  Continue group therapy.  Continue group therapy.

## 2020-04-21 ENCOUNTER — HOSPITAL ENCOUNTER (OUTPATIENT)
Dept: BEHAVIORAL HEALTH | Facility: CLINIC | Age: 30
End: 2020-04-21
Attending: FAMILY MEDICINE
Payer: COMMERCIAL

## 2020-04-21 PROCEDURE — 10020000 ZZH LODGING PLUS FACILITY CHARGE ADULT

## 2020-04-21 PROCEDURE — H2035 A/D TX PROGRAM, PER HOUR: HCPCS | Mod: HQ

## 2020-04-21 PROCEDURE — H2035 A/D TX PROGRAM, PER HOUR: HCPCS

## 2020-04-21 NOTE — PROGRESS NOTES
W and Pt spoke in office during an individual session this evening starting at 5pm ending 5:20pm. Pt verbalized a desire to leave treatment. W explored this with Pt, utilized MI. W gave Pt a reading for this evening. Pt verbalizes he would like to leave treatment tomorrow morning.

## 2020-04-22 VITALS — OXYGEN SATURATION: 99 % | TEMPERATURE: 97.2 F

## 2020-04-22 NOTE — PROGRESS NOTES
Name: Bebeto Alvarado  Date: 4/22/2020  Medical Record: [id number]    Envelope Number: CSRX    List of Contents (List each item separately in new row):   Suboxone 2-0.5 mg tabs - 4 - TO BE DESTROYED-    Admission:  I am responsible for any personal items that are not sent to the safe or pharmacy.  [facility] is not responsible for loss, theft or damage of any property in my possession.      Patient Signature:  ___________________________________________       Date/Time:__________________________    Staff Signature: __________________________________       Date/Time:__________________________    [facility] Staff person, if patient is unable/unwilling to sign:      __________________________________________________________       Date/Time: __________________________      Discharge:  [facility] has returned all of my personal belongings:    Patient Signature: ________________________________________     Date/Time: ____________________________________    Staff Signature: ______________________________________     Date/Time:_____________________________________

## 2020-04-28 NOTE — PROGRESS NOTES
"[facility] ADDICTION MEDICINE  TELEPHONE Progress Note                                                      SUBJECTIVE                                                    Bebeto Alvarado is a 29 year old male who is being evaluated via a billable TELEPHONE visit due to COVID-19 pandemic in order to reduce potential unnecessary exposure to the virus.         The patient has been notified of following:     \"This telephone visit will be conducted via a call between you and your physician/provider. We have found that certain health care needs can be provided without the need for a physical exam.  This service lets us provide the care you need with a short phone conversation. If a prescription is necessary we can send it directly to your pharmacy.  If lab work is needed we can place an order for that and you can then stop by our lab to have the test done at a later time.    If during the course of the call the physician/provider feels a telephone visit is not appropriate, you will not be charged for this service.\"     Bebeto Alvarado complains of    Chief Complaint   Patient presents with     Addiction Problem     I have reviewed and updated the patient's Past Medical History, Social History, Family History and Medication List.    ALLERGIES  Susan Whelan (MA signature)    Date of last visit:  4/14/2020  BUPRENORPHINE FOLLOW UP: current dose sublocade 100 mg monthly injection received on 4/15/2020.     Primary Care Provider: NEEL Tariq CNP   [place] Board of Pharmacy Data Base Reviewed:    Yes; reviewed and no concerns for diversionary activity.  Most recent data includes:  04/08/2020   1     04/07/2020   Buprenorphn-Naloxn 2-0.5 Mg Sl         28.00  7  Kr Calli      Brief History:    Initial visit with me on 11/20/2019  Recreational use of mushrooms and marijuana as a teen.  At 18 started using oxycontin orally at parties then started smoking heroin at age 20. Around age [age] " started using heroin daily to avoid withdrawal symptoms.  Detox and treatment at [facility] in 10/2018 for one week but asked to leave as he was accused of buying drugs on the campus.  Started using heroin IV in 06/2019 1/2 to 1 gram daily.  Detox admission 11/15/2019 -11/19/2019 and went to [facility] once a bed was available.  Graduated and transitioned to [facility] on 12/30/2019 then kicked out for 1 time heroin relapse and moved to [facility] for continued CD treatment.      Tobacco use - reducing, 5 cigarettes daily and wants to quit with support from nicotine gum and nicotine patches and wellbutrin     Hepatitis C Status - Nonreactive 11/14/2019               HIV status - Nonreactive 11/14/2019    Date of last use:   Heroin - 03/08/2020 IV use; relapse again on 4/9/2020 once non-IV    HPI:    4/29/2020  Patient status since last visit: improving    Cravings: denies    MAT Dose: adequate at this time    Side Effects: none      Cues to use and relapse triggers: boredom    Patient left [facility] early with assistance of their care coordinator as patient was experiencing more depression feeling isolated and locked up in [place] due to COVID policy changes.  He went directly to [facility] - new location than last time.  He reports its going well and he likes the people there and so far likes his counselor.  He said he is the youngest one at the [facility] which helps sobriety.      He does not currently have his next sublocade injection scheduled but plans to call and schedule this today.  He reports first injection went well.  Having inconsistent restlessness and chills throughout the week but otherwise no withdrawal and no cravings.  Will continue to evaluate for efficacy of sublocade after second 100 mg injection.  Consider retest on Hep C and HIV given several episodes of IV relapse since 11/2019 testing.  Denies sharing needles and syringes.     Only physical concern is legally blind in left  eye and has been having intermittent throbbing in left eye the last 2 months and eye doctor feels related to dry eyes.  He will reschedule with eye doctor in 1 month if sx persist per their direction.  Denies other physical concerns.  Mood stable at [place] with weather improved and increased freedom to go outside compared to [place].      Tobacco use - smoking cigarettes intermittently and vaping now    Social History:   Reviewed today.  See HPI for changes since last visit.     Problem list and histories reviewed & adjusted, as indicated.  Additional history: as documented    Patient Active Problem List    Diagnosis Date Noted     Chemical dependency (H) 04/10/2020     Priority: Medium     Opioid use disorder, moderate, dependence (H) 03/10/2020     Priority: Medium     History of heroin abuse (H) 2019     Priority: Medium     Opioid dependence, uncomplicated (H) 2019     Priority: Medium     Attention deficit disorder 12/15/2004     Priority: Medium     Epic       Cannabis abuse 12/15/2004     Priority: Medium     Epic         Current Medications:  acetaminophen (TYLENOL) 325 MG tablet, Take 650 mg by mouth every 4 hours as needed for mild pain  alum & mag hydroxide-simethicone (MAALOX) 200-200-20 MG/5ML SUSP suspension, Take 30 mLs by mouth every 6 hours as needed for indigestion  [] buprenorphine-naloxone (SUBOXONE) 2-0.5 MG SUBL sublingual tablet, Place 2 tablets under the tongue 2 times daily  buPROPion HCl ER, XL, 450 MG TB24, Take 450 mg by mouth daily Take 1 tablet (450mg) by mouth every morning.  guaiFENesin (ROBITUSSIN) 20 mg/mL SOLN solution, Take 10 mLs by mouth every 4 hours as needed for cough  ibuprofen (ADVIL/MOTRIN) 200 MG tablet, Take 400 mg by mouth every 6 hours as needed for mild pain  loratadine (CLARITIN) 10 MG tablet, Take 10 mg by mouth daily as needed for allergies  melatonin 3 MG tablet, Take 3 mg by mouth nightly as needed for sleep  naloxone (NARCAN) 4 MG/0.1ML nasal spray,  Spray 1 spray (4 mg) into one nostril alternating nostrils as needed for opioid reversal every 2-3 minutes until assistance arrives  nicotine (NICORETTE) 2 MG gum, Place 1-2 each (2-4 mg) inside cheek as needed for smoking cessation  phenol-menthol (CEPASTAT) 14.5 MG lozenge, Place 1 lozenge inside cheek every 2 hours as needed for moderate pain  senna-docusate (SENOKOT-S/PERICOLACE) 8.6-50 MG tablet, Take 2 tablets by mouth daily as needed for constipation  traZODone (DESYREL) 50 MG tablet, Take 1 tablet (50 mg) by mouth nightly as needed for sleep    No current facility-administered medications on file prior to visit.     OBJECTIVE                                                    LABS:  Labs reviewed. No UDS collected as patient evaluated over TELEPHONE visit.     MENTAL STATUS EXAM:  Appearance:  not assessed - telephone visit   Attitude:  cooperative  Mood:  better  Affect: not assessed - telephone visit   Psychomotor Behavior: not assessed - telephone visit   Thought Content:  no evidence of suicidal ideation or homicidal ideation, no evidence of psychotic thought, no auditory hallucinations present and no visual hallucinations present  Insight:  fair  Judgement:  fair  Oriented to:  time, person, and place    ASSESSMENT/PLAN                                                      (F11.20) Opioid use disorder, moderate, dependence (H)  (primary encounter diagnosis)  Comment: No opioid use since prior to [place] per patient.  No sublocade injection scheduled at this time but would like to continue injection.  Mild restlessness/chills inconsistently throughout the week.  Denies cravings or side effects from the injection other than one time noticed increased sedation after accidentally pressing on injection site.  Patient is high risk of relapse in less structure treatment setting.    Plan: Continue sublocade 100 mg monthly injection.  Encouraged patient to call [facility] and schedule his next injection for first available  and will monitor for opioid withdrawal symptoms and cravings for opioids with each injection.  May consider 300 mg sublocade x 2 if withdrawal symptoms persist.     Continue CD treatment at [facility] and recovery meetings (virtually) to engage in recovery programming.     Patient to have [facility] send UDS results to our clinic once obtained.  He will present for in person visit at next follow up for UDS collection and evaluation.     (F33.1) Moderate episode of recurrent major depressive disorder (H)  Comment: increased depression while in [place] due to feeling isolated and stuck with COVID pandemic and inability to leave the facility due to regulations in place to prevent spread of COVID.  Mood and isolation improved at [facility].   Plan: Start taking buPROPion (WELLBUTRIN XL) 300 MG 24 hr tablet, take 1 tablet in the AM with buPROPion (WELLBUTRIN XL) 150 MG 24 hr tablet for a total of 450 mg daily.     Stop taking wellbutrin  mg tablet.   Continue going on walks and maintaining structured schedule as able while observing social distancing and masking guidelines to prevent spread of COVID infection while reducing isolation.     (F90.0) ADHD (attention deficit hyperactivity disorder), inattentive type  Comment: insurance doesn't cover wellbutrin  mg tablets.  Mood stable now that out of  and feeling less isolated/stuck while at [facility]  Plan: Start taking buPROPion (WELLBUTRIN XL) 300 MG 24 hr tablet, take 1 tablet in the AM with buPROPion (WELLBUTRIN XL) 150 MG 24 hr tablet for a total of 450 mg daily.     Stop taking wellbutrin  mg tablet.     (F17.200) Tobacco use disorder  Comment: smoking cigarettes occasionally, recently started vaping and no longer using nicotine gum  Plan: Encouraged cessation without replacement with vaping as vaping also presents health risks.     Will continue to evaluate readiness to quit all nicotine use.     (Z79.469) High risk medication use  Comment:  sublocade injection  Plan: monitor UDS, cravings and return to use        ENCOUNTER FOR LONG TERM USE OF HIGH RISK MEDICATION   High Risk Drug Monitoring?  YES   Drug being monitored: Buprenorphine   Reason for drug: Opioid use disorder   What is being monitored?: Dosage, Cravings, Trigger, side effects, and continued abstinence.      Follow-up: 3 weeks in person    Patient counseling completed today:  Counseled the patient on the importance of having a recovery program in addition to Suboxone maintenance.  Also discussed having a sober support network, not isolating, being open, honest, and willing to change, avoiding triggers and managing cravings.  In addition, abstinence from alcohol, benzodiazepines, THC, opioids and other drugs of abuse was recommended.  Risk of overdose following a period of abstinence due to decrease tolerance was discussed including risk of death.  Risk of overdose if using Suboxone with other substances particuarly benzodiazepines, alcohol, gabapentin, or other CNS depressants was reviewed.      Phone call contact time (18 min)   Call Started at 10:35 AM  Call Ended at 10:53 AM    I have reviewed the note as documented above.  This accurately captures the substance of my conversation with the patient.      Jasmin Coppola, CNP  [facility] - Addiction Medicine  [facility]  [phone]

## 2020-04-29 ENCOUNTER — VIRTUAL VISIT (OUTPATIENT)
Dept: ADDICTION MEDICINE | Facility: CLINIC | Age: 30
End: 2020-04-29
Payer: COMMERCIAL

## 2020-04-29 DIAGNOSIS — F90.0 ADHD (ATTENTION DEFICIT HYPERACTIVITY DISORDER), INATTENTIVE TYPE: ICD-10-CM

## 2020-04-29 DIAGNOSIS — Z79.899 HIGH RISK MEDICATION USE: ICD-10-CM

## 2020-04-29 DIAGNOSIS — F33.1 MODERATE EPISODE OF RECURRENT MAJOR DEPRESSIVE DISORDER (H): ICD-10-CM

## 2020-04-29 DIAGNOSIS — F17.200 TOBACCO USE DISORDER: ICD-10-CM

## 2020-04-29 DIAGNOSIS — F11.20 OPIOID USE DISORDER, MODERATE, DEPENDENCE (H): Primary | ICD-10-CM

## 2020-04-29 PROCEDURE — 99214 OFFICE O/P EST MOD 30 MIN: CPT | Mod: 95 | Performed by: NURSE PRACTITIONER

## 2020-04-29 RX ORDER — BUPROPION HYDROCHLORIDE 300 MG/1
300 TABLET ORAL EVERY MORNING
Qty: 30 TABLET | Refills: 3 | Status: SHIPPED | OUTPATIENT
Start: 2020-04-29 | End: 2020-05-20

## 2020-04-29 RX ORDER — BUPROPION HYDROCHLORIDE 150 MG/1
150 TABLET ORAL EVERY MORNING
Qty: 30 TABLET | Refills: 3 | Status: SHIPPED | OUTPATIENT
Start: 2020-04-29 | End: 2020-05-20

## 2020-05-13 ENCOUNTER — INFUSION THERAPY VISIT (OUTPATIENT)
Dept: INFUSION THERAPY | Facility: CLINIC | Age: 30
End: 2020-05-13
Attending: NURSE PRACTITIONER
Payer: COMMERCIAL

## 2020-05-13 VITALS
SYSTOLIC BLOOD PRESSURE: 128 MMHG | DIASTOLIC BLOOD PRESSURE: 75 MMHG | TEMPERATURE: 98.4 F | WEIGHT: 161.2 LBS | OXYGEN SATURATION: 98 % | HEART RATE: 84 BPM | RESPIRATION RATE: 18 BRPM | BODY MASS INDEX: 25.25 KG/M2

## 2020-05-13 DIAGNOSIS — F11.20 OPIOID USE DISORDER, MODERATE, DEPENDENCE (H): Primary | ICD-10-CM

## 2020-05-13 PROCEDURE — 96372 THER/PROPH/DIAG INJ SC/IM: CPT

## 2020-05-13 PROCEDURE — 25000125 ZZHC RX 250: Mod: ZF | Performed by: NURSE PRACTITIONER

## 2020-05-13 PROCEDURE — 25000128 H RX IP 250 OP 636: Mod: ZF | Performed by: NURSE PRACTITIONER

## 2020-05-13 RX ORDER — LIDOCAINE HYDROCHLORIDE 10 MG/ML
2 INJECTION, SOLUTION EPIDURAL; INFILTRATION; INTRACAUDAL; PERINEURAL ONCE
Status: CANCELLED | OUTPATIENT
Start: 2020-06-10

## 2020-05-13 RX ORDER — LIDOCAINE HYDROCHLORIDE 10 MG/ML
2 INJECTION, SOLUTION EPIDURAL; INFILTRATION; INTRACAUDAL; PERINEURAL ONCE
Status: COMPLETED | OUTPATIENT
Start: 2020-05-13 | End: 2020-05-13

## 2020-05-13 RX ADMIN — BUPRENORPHINE 100 MG: 100 SOLUTION SUBCUTANEOUS at 14:07

## 2020-05-13 RX ADMIN — LIDOCAINE HYDROCHLORIDE 2 ML: 10 INJECTION, SOLUTION EPIDURAL; INFILTRATION; INTRACAUDAL; PERINEURAL at 14:05

## 2020-05-13 ASSESSMENT — PAIN SCALES - GENERAL: PAINLEVEL: SEVERE PAIN (6)

## 2020-05-13 NOTE — PROGRESS NOTES
Infusion Nursing Note:  Bebeto Alvarado presents today for sublocade 100mg.    Patient seen by provider today: No   present during visit today: Not Applicable.    Note: Patient states first dose went well, however the first couple days he felt some withdrawal symptoms.    Intravenous Access:  No Intravenous access/labs at this visit.    Post Infusion Assessment:  Patient tolerated injection without incident. Lidocaine given prior to injection.  Sublocade given in right lower quadrant.    Discharge Plan:   Patient discharged in stable condition accompanied by: self.  Departure Mode: Ambulatory.  Will return to clinic in 4 weeks.  Tiffanie Martínez RN

## 2020-05-20 ENCOUNTER — OFFICE VISIT (OUTPATIENT)
Dept: ADDICTION MEDICINE | Facility: CLINIC | Age: 30
End: 2020-05-20
Payer: COMMERCIAL

## 2020-05-20 VITALS
TEMPERATURE: 98.4 F | HEIGHT: 67 IN | HEART RATE: 92 BPM | DIASTOLIC BLOOD PRESSURE: 60 MMHG | BODY MASS INDEX: 24.33 KG/M2 | OXYGEN SATURATION: 99 % | SYSTOLIC BLOOD PRESSURE: 110 MMHG | WEIGHT: 155 LBS

## 2020-05-20 DIAGNOSIS — F90.0 ADHD (ATTENTION DEFICIT HYPERACTIVITY DISORDER), INATTENTIVE TYPE: ICD-10-CM

## 2020-05-20 DIAGNOSIS — F11.20 OPIOID USE DISORDER, MODERATE, DEPENDENCE (H): Primary | ICD-10-CM

## 2020-05-20 DIAGNOSIS — Z79.899 HIGH RISK MEDICATION USE: ICD-10-CM

## 2020-05-20 DIAGNOSIS — F33.1 MODERATE EPISODE OF RECURRENT MAJOR DEPRESSIVE DISORDER (H): ICD-10-CM

## 2020-05-20 DIAGNOSIS — F17.200 TOBACCO USE DISORDER: ICD-10-CM

## 2020-05-20 LAB
AMPHETAMINES UR QL: NOT DETECTED NG/ML
BARBITURATES UR QL SCN: NOT DETECTED NG/ML
BENZODIAZ UR QL SCN: ABNORMAL NG/ML
BUPRENORPHINE UR QL: ABNORMAL NG/ML
CANNABINOIDS UR QL: NOT DETECTED NG/ML
COCAINE UR QL SCN: NOT DETECTED NG/ML
D-METHAMPHET UR QL: NOT DETECTED NG/ML
METHADONE UR QL SCN: NOT DETECTED NG/ML
OPIATES UR QL SCN: ABNORMAL NG/ML
OXYCODONE UR QL SCN: NOT DETECTED NG/ML
PCP UR QL SCN: NOT DETECTED NG/ML
PROPOXYPH UR QL: NOT DETECTED NG/ML
TRICYCLICS UR QL SCN: NOT DETECTED NG/ML

## 2020-05-20 PROCEDURE — 99215 OFFICE O/P EST HI 40 MIN: CPT | Performed by: NURSE PRACTITIONER

## 2020-05-20 PROCEDURE — 80306 DRUG TEST PRSMV INSTRMNT: CPT | Performed by: NURSE PRACTITIONER

## 2020-05-20 RX ORDER — BUPROPION HYDROCHLORIDE 300 MG/1
300 TABLET ORAL EVERY MORNING
Qty: 30 TABLET | Refills: 3 | Status: SHIPPED | OUTPATIENT
Start: 2020-05-20 | End: 2020-07-03

## 2020-05-20 RX ORDER — BUPROPION HYDROCHLORIDE 150 MG/1
150 TABLET ORAL EVERY MORNING
Qty: 30 TABLET | Refills: 3 | Status: SHIPPED | OUTPATIENT
Start: 2020-05-20 | End: 2020-07-03 | Stop reason: DRUGHIGH

## 2020-05-20 ASSESSMENT — PAIN SCALES - GENERAL: PAINLEVEL: NO PAIN (0)

## 2020-05-20 ASSESSMENT — MIFFLIN-ST. JEOR: SCORE: 1621.71

## 2020-05-20 NOTE — LETTER
May 20, 2020      Bebeto Alvarado  [address]  [place] [address]              To Whom it May Concern;    Patient attended his scheduled doctor's appointment with me today, 5/20/2020 @ 1:00 PM.  The visit concluded just before 2PM.     Fee free to call with any questions.           Sincerely,      Jasmin Coppola, TAD, CNP

## 2020-05-20 NOTE — PROGRESS NOTES
SUBJECTIVE                                                    CC: Patient presents with:  Drug Problem    BUPRENORPHINE FOLLOW UP: current dose sublocade 100 mg monthly injection.  Last injection received on 5/13/2020.      Bebeto Alvarado is a 29 year old male who presents to clinic today for follow up of Buprenorphine.      Date of last visit:  4/29/2020    Primary Care Provider: NEEL Tariq CNP   [place] Board of Pharmacy Data Base Reviewed:    Yes; reviewed and no concerns for diversionary activity.  Most recent data includes:  04/08/2020 1 04/07/2020 Buprenorphn-Naloxn 2-0.5 Mg Sl  28.00 7 Kr Calli    Brief History:    Initial visit with me on 11/20/2019  Recreational use of mushrooms and marijuana as a teen.  At 18 started using oxycontin orally at parties then started smoking heroin at age 20. Around age [age] started using heroin daily to avoid withdrawal symptoms.  Detox and treatment at [facility] in 10/2018 for one week but asked to leave as he was accused of buying drugs on the campus.  Started using heroin IV in 06/2019 1/2 to 1 gram daily.  Detox admission 11/15/2019 -11/19/2019 and went to [facility] once a bed was available.  Graduated and transitioned to [facility] on 12/30/2019 then kicked out for 1 time heroin relapse and moved to [facility] for continued  treatment.      Tobacco use - vaping now, bought an e-cig and smokes occasionally     Hepatitis C Status - Nonreactive 11/14/2019               HIV status - Nonreactive 11/14/2019    Date of last use:   Heroin - 03/08/2020 IV use; relapse once last week smoked heroin     HPI:    5/20/2020  Patient status since last visit: decompensated     Cravings: denies    MAT Dose: adequate - sublocade    Side Effects: none    Cues to use and relapse triggers: being around drugs/using peers    He reports his sublocade injection is going well and enjoys the freedom of not taking suboxone daily.  He relapsed x1 smoking heroin  "last week.  He reports he didn't get \"as high\" while taking sublocade.  Discussed urine tox results positive for benzodiazepines but he reports his heroin was likely cut with benzodiazepines because he never knowingly took benzos.  Patient does not have his next sublocade injection scheduled at this time.  Reminded to schedule the next injection prior to leaving infusion center so that he remembers to have the injection.  Discussed retest for hep C and HIV due to several episodes of IVDU since 11/2019 testing but he declines at this time.  He denies sharing needles or syringes.       He is still at [facility] with sober living but moved to a different sober house due to relapse last week.  He reports \"everyone was using in the house\" and states he told the  but his roommates all passed their urine tox tests.  He states his new sober house is more strict which he doesn't like but agrees that it is for the best.  He also states he has a girlfriend now who is about 6 months sober.  He met her in [place].  Part of his frustration with his new sober house is that he can't spend more time with his girlfriend.  He is also working full time delivering pizza.  Mood is stable and is glad to have a girlfriend to spend time with.  Wellbutrin 450 XL daily has been helpful for mood and focus.  He appears apathetic about recovery at this time despite stating he wants to be sober.        Social History:   Reviewed today.  See HPI for changes since last visit.     Problem list and histories reviewed & adjusted, as indicated.  Additional history: as documented    Patient Active Problem List    Diagnosis Date Noted     Chemical dependency (H) 04/10/2020     Priority: Medium     Opioid use disorder, moderate, dependence (H) 03/10/2020     Priority: Medium     History of heroin abuse (H) 12/31/2019     Priority: Medium     Opioid dependence, uncomplicated (H) 12/01/2019     Priority: Medium     Attention deficit disorder " 12/15/2004     Priority: Medium     Epic       Cannabis abuse 12/15/2004     Priority: Medium     Epic         Current Medications:  acetaminophen (TYLENOL) 325 MG tablet, Take 650 mg by mouth every 4 hours as needed for mild pain  [] buprenorphine-naloxone (SUBOXONE) 2-0.5 MG SUBL sublingual tablet, Place 2 tablets under the tongue 2 times daily  buPROPion (WELLBUTRIN XL) 150 MG 24 hr tablet, Take 1 tablet (150 mg) by mouth every morning  buPROPion (WELLBUTRIN XL) 300 MG 24 hr tablet, Take 1 tablet (300 mg) by mouth every morning  ibuprofen (ADVIL/MOTRIN) 200 MG tablet, Take 400 mg by mouth every 6 hours as needed for mild pain  naloxone (NARCAN) 4 MG/0.1ML nasal spray, Spray 1 spray (4 mg) into one nostril alternating nostrils as needed for opioid reversal every 2-3 minutes until assistance arrives  nicotine (NICORETTE) 2 MG gum, Place 1-2 each (2-4 mg) inside cheek as needed for smoking cessation  traZODone (DESYREL) 50 MG tablet, Take 1 tablet (50 mg) by mouth nightly as needed for sleep    No current facility-administered medications on file prior to visit.       Allergies   Allergen Reactions     Codeine Unknown     Pt has been told.       REVIEW OF SYSTEMS:  General:  No acute withdrawal symptoms.  No recent infections or fever  Eyes:  No vision concerns.  No double vision.    Resp: No coughing, wheezing or shortness of breath  CV: No chest pains or palpitations  GI: No nausea, vomiting, abdominal pain, diarrhea.  No constipation  : No urinary frequency or dysuria    Musculoskeletal: No significant muscle or joint pains other than as above.  No edema  Neurologic: No numbness, tingling, weakness, problems with balance or coordination  Psychiatric: No acute concerns other than as above.   Skin: No rashes or areas of acute infection      OBJECTIVE                                                    LABS:  Labs reviewed.  Urine tox results POS: opiates, benzodiazepines and buprenorphine today.  He reports  "using heroin that he believes was cut with benzodiazepines.  Will monitor for presence in next UA and sent confirmation at that time if needed.     Results for orders placed or performed in visit on 05/20/20   Urine Drugs of Abuse Screen Panel 13     Status: Abnormal   Result Value Ref Range    Cannabinoids ([id number]-[id number]) Not Detected NDET^Not Detected ng/mL    Phencyclidine (Phencyclidine) Not Detected NDET^Not Detected ng/mL    Cocaine (Benzoylecgonine) Not Detected NDET^Not Detected ng/mL    Methamphetamine (d-Methamphetamine) Not Detected NDET^Not Detected ng/mL    Opiates (Morphine) Detected, Abnormal Result (A) NDET^Not Detected ng/mL    Amphetamine (d-Amphetamine) Not Detected NDET^Not Detected ng/mL    Benzodiazepines (Nordiazepam) Detected, Abnormal Result (A) NDET^Not Detected ng/mL    Tricyclic Antidepressants (Desipramine) Not Detected NDET^Not Detected ng/mL    Methadone (Methadone) Not Detected NDET^Not Detected ng/mL    Barbiturates (Butalbital) Not Detected NDET^Not Detected ng/mL    Oxycodone (Oxycodone) Not Detected NDET^Not Detected ng/mL    Propoxyphene (Norpropoxyphene) Not Detected NDET^Not Detected ng/mL    Buprenorphine (Buprenorphine) Detected, Abnormal Result (A) NDET^Not Detected ng/mL       PHYSICAL EXAM:  /60   Pulse 92   Temp 98.4  F (36.9  C) (Oral)   Ht 1.702 m (5' 7\")   Wt 70.3 kg (155 lb)   SpO2 99%   BMI 24.28 kg/m      GENERAL APPEARANCE:  alert, comfortable appearing and fatigued  EYES:Eyes grossly normal to inspection  NEURO:  Gait normal.  No tremor. Coordination intact.     MENTAL STATUS EXAM:  Appearance:  awake, alert, well groomed, fatigued, cooperative and no apparent distress  Attitude:  cooperative and evasive  Mood:  good  Affect:  appropriate and in normal range and mood congruent  Psychomotor Behavior:  no evidence of tardive dyskinesia, dystonia, or tics  Thought Content:  no evidence of suicidal ideation or homicidal ideation, no evidence of " psychotic thought, no auditory hallucinations present and no visual hallucinations present  Insight:  fair  Judgement:  fair  Oriented to:  time, person, and place      ASSESSMENT/PLAN                                                      (F11.20) Opioid use disorder, moderate, dependence (H)  (primary encounter diagnosis)  Comment: One day relapse last week.  Moved sober houses within [facility].  No sublocade injection scheduled at this time but would like to continue injection.   Denies cravings or side effects from the injection. Patient is high risk of relapse in less structure treatment setting and significantly ambivalent about treatment.    Plan: Continue sublocade 100 mg monthly injection.  Encouraged patient to call [facility] and schedule his next injection for first available minimum of 26 days from his last injection last week.      Continue CD treatment at [facility] and recovery meetings (virtually) to engage in recovery programming.      (F33.1) Moderate episode of recurrent major depressive disorder (H)  Comment:  Mood and isolation [facility] and since relationship with current girlfriend started  Plan: Continue taking buPROPion (WELLBUTRIN XL) 300 MG 24 hr tablet, take 1 tablet in the AM with buPROPion (WELLBUTRIN XL) 150 MG 24 hr tablet for a total of 450 mg daily.      Continue going on walks and maintaining structured schedule as able while observing social distancing and masking guidelines to prevent spread of COVID infection while reducing isolation.      (F90.0) ADHD (attention deficit hyperactivity disorder), inattentive type  Comment: insurance doesn't cover wellbutrin  mg tablets.  Mood stable now that out of [place] and feeling less isolated  Plan: Continue taking buPROPion (WELLBUTRIN XL) 300 MG 24 hr tablet, take 1 tablet in the AM with buPROPion (WELLBUTRIN XL) 150 MG 24 hr tablet for a total of 450 mg daily.      (F17.200) Tobacco use disorder  Comment: smoking cigarettes  occasionally, vaping daily and wanting to reduce use  Plan: Encouraged cessation without replacement with vaping as vaping also presents health risks.     Take nicotine 2 mg gum every hour as needed for vaping/smoking cessation.      Will continue to evaluate readiness to quit all nicotine use.      (Z79.899) High risk medication use  Comment: sublocade injection  Plan: monitor UDS, cravings and return to use    ENCOUNTER FOR LONG TERM USE OF HIGH RISK MEDICATION   High Risk Drug Monitoring?  YES   Drug being monitored: Buprenorphine   Reason for drug: Opioid use disorder   What is being monitored?: Dosage, Cravings, Trigger, side effects, and continued abstinence.      Follow-up: 2 weeks IN PERSON    Patient counseling completed today:  Counseled the patient on the importance of remaining accountable and engaged in the recovery community in order to maintain sobriety.  Also discussed the importance of building healthy relationships and boundaries to find purpose with careful attention to avoid co-dependency that may lead to relapse.     Discussed risks of using benzodiazepines including risk of using them while on suboxone including risk of overdose and death.       Jasmin Coppola, CNP  [facility] - Addiction Medicine  [facility]  [phone]

## 2020-06-11 ENCOUNTER — INFUSION THERAPY VISIT (OUTPATIENT)
Dept: INFUSION THERAPY | Facility: CLINIC | Age: 30
End: 2020-06-11
Attending: NURSE PRACTITIONER
Payer: COMMERCIAL

## 2020-06-11 VITALS
DIASTOLIC BLOOD PRESSURE: 59 MMHG | SYSTOLIC BLOOD PRESSURE: 107 MMHG | OXYGEN SATURATION: 98 % | HEART RATE: 76 BPM | TEMPERATURE: 98.1 F | RESPIRATION RATE: 16 BRPM

## 2020-06-11 DIAGNOSIS — F11.20 OPIOID USE DISORDER, MODERATE, DEPENDENCE (H): Primary | ICD-10-CM

## 2020-06-11 PROCEDURE — 96372 THER/PROPH/DIAG INJ SC/IM: CPT

## 2020-06-11 PROCEDURE — 25000128 H RX IP 250 OP 636: Mod: ZF | Performed by: NURSE PRACTITIONER

## 2020-06-11 PROCEDURE — 25000125 ZZHC RX 250: Mod: ZF | Performed by: NURSE PRACTITIONER

## 2020-06-11 RX ORDER — LIDOCAINE HYDROCHLORIDE 10 MG/ML
2 INJECTION, SOLUTION EPIDURAL; INFILTRATION; INTRACAUDAL; PERINEURAL ONCE
Status: COMPLETED | OUTPATIENT
Start: 2020-06-11 | End: 2020-06-11

## 2020-06-11 RX ORDER — LIDOCAINE HYDROCHLORIDE 10 MG/ML
2 INJECTION, SOLUTION EPIDURAL; INFILTRATION; INTRACAUDAL; PERINEURAL ONCE
Status: CANCELLED | OUTPATIENT
Start: 2020-07-08

## 2020-06-11 RX ADMIN — LIDOCAINE HYDROCHLORIDE 2 ML: 10 INJECTION, SOLUTION EPIDURAL; INFILTRATION; INTRACAUDAL; PERINEURAL at 12:19

## 2020-06-11 RX ADMIN — BUPRENORPHINE 100 MG: 100 SOLUTION SUBCUTANEOUS at 12:22

## 2020-06-11 NOTE — PROGRESS NOTES
Nursing Note  Bebeto Alvarado presents today [facility] for:   Chief Complaint   Patient presents with     Infusion     Sublocade     During today's [facility] appointment, orders from Jasmin Coppola CNP were completed.  Frequency: monthly, with at least 26 days between doses.    Progress note:  Patient identification verified by name and date of birth.  Assessment completed.  Vitals recorded in Doc Flowsheets.  Patient was provided with education regarding medication/procedure and possible side effects.  Patient verbalized understanding.     present during visit today: Not Applicable.    Treatment Conditions: sober    Premedications: lidocaine administered prior to sublocade per order    Drug Waste Record: No    Given via subQ injection in left upper quadrant    Labs: were not ordered for this appointment.    Vascular access: not required     Post Infusion Assessment:  Patient tolerated infusion without incident. Printed AVS, discharge instructions reviewed with patient. Denied further questions. Discharged from [facility] in stable condition to treatment facility.     Discharge Plan:   Follow up plan of care with: ongoing infusions at [facility]. and after visit summary given to patient  Discharge instructions were reviewed with patient.  Patient/representative verbalized understanding of discharge instructions and all questions answered.  Patient discharged from [facility] in stable condition.    Veronica Malave RN    Administrations This Visit     buprenorphine ER (SUBLOCADE) syringe 100 mg     Admin Date  06/11/2020 Action  Given Dose  100 mg Route  Subcutaneous Administered By  Veronica Malave RN          lidocaine (PF) (XYLOCAINE) 1 % injection 2 mL     Admin Date  06/11/2020 Action  Given Dose  2 mL Route  Subcutaneous Administered By  Veronica Malave RN                    /59   Pulse 76   Temp 98.1  F (36.7  C) (Oral)   Resp 16   SpO2 98%

## 2020-06-11 NOTE — LETTER
6/11/2020         RE: Bebeto Alvarado  [address]  [place] [address]        Dear Colleague,    Thank you for referring your patient, Bebeto Alvarado, to the [facility] TREATMENT [place] SPECIALTY AND PROCEDURE. Please see a copy of my visit note below.    Nursing Note  Bebeto Alvarado presents [facility] for:   Chief Complaint   Patient presents with     Infusion     Sublocade     During [facility]'s [facility] appointment, orders from Jasmin Coppola CNP were completed.  Frequency: monthly, with at least 26 days between doses.    Progress note:  Patient identification verified by name and date of birth.  Assessment completed.  Vitals recorded in Doc Flowsheets.  Patient was provided with education regarding medication/procedure and possible side effects.  Patient verbalized understanding.     present during visit today: Not Applicable.    Treatment Conditions: sober    Premedications: lidocaine administered prior to sublocade per order    Drug Waste Record: No    Given via subQ injection in left upper quadrant    Labs: were not ordered for this appointment.    Vascular access: not required     Post Infusion Assessment:  Patient tolerated infusion without incident. Printed AVS, discharge instructions reviewed with patient. Denied further questions. Discharged from [facility] in stable condition to treatment facility.     Discharge Plan:   Follow up plan of care with: ongoing infusions at [facility]. and after visit summary given to patient  Discharge instructions were reviewed with patient.  Patient/representative verbalized understanding of discharge instructions and all questions answered.  Patient discharged [facility] in stable condition.    Veronica Childers RN    Administrations This Visit     buprenorphine ER (SUBLOCADE) syringe 100 mg     Admin  Date  06/11/2020 Action  Given Dose  100 mg Route  Subcutaneous Administered By  Veronica Malave RN          lidocaine (PF) (XYLOCAINE) 1 % injection 2 mL     Admin Date  06/11/2020 Action  Given Dose  2 mL Route  Subcutaneous Administered By  Veronica Malave RN                   /59   Pulse 76   Temp 98.1  F (36.7  C) (Oral)   Resp 16   SpO2 98%     Again, thank you for allowing me to participate in the care of your patient.        Sincerely,        [facility]

## 2020-06-22 ENCOUNTER — TELEPHONE (OUTPATIENT)
Dept: ADDICTION MEDICINE | Facility: CLINIC | Age: 30
End: 2020-06-22
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
## 2020-06-22 NOTE — TELEPHONE ENCOUNTER
Okay for virtual visit as next visit.  No further action needed at this time at patient is on Sublocade.

## 2020-06-22 NOTE — TELEPHONE ENCOUNTER
Noted.  Patient already scheduled as such.   No further action needed.    Rita Caldera, ORACIO    Nurse Liaison  [facility]    Addiction Medicine Services

## 2020-06-22 NOTE — TELEPHONE ENCOUNTER
Patient had called again wanting to know if Jasmin would be OK with him scheduling his next appt as a virtual visit instead of in person. Writer ended up scheduling a phone visit for patient on providers next avail appt. 07/03 @ 10:30 if provider does not want it to be virtual let writer know so it can be changed to in person

## 2020-06-22 NOTE — TELEPHONE ENCOUNTER
Routing to provider for review.    Rita Caldera, RN    Nurse Liaison  [facility]    Addiction Medicine Services
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
## 2020-07-03 ENCOUNTER — VIRTUAL VISIT (OUTPATIENT)
Dept: ADDICTION MEDICINE | Facility: CLINIC | Age: 30
End: 2020-07-03
Payer: COMMERCIAL

## 2020-07-03 DIAGNOSIS — F17.200 TOBACCO USE DISORDER: ICD-10-CM

## 2020-07-03 DIAGNOSIS — Z79.899 HIGH RISK MEDICATION USE: ICD-10-CM

## 2020-07-03 DIAGNOSIS — F11.20 OPIOID USE DISORDER, MODERATE, DEPENDENCE (H): Primary | ICD-10-CM

## 2020-07-03 DIAGNOSIS — F33.1 MODERATE EPISODE OF RECURRENT MAJOR DEPRESSIVE DISORDER (H): ICD-10-CM

## 2020-07-03 DIAGNOSIS — F90.0 ADHD (ATTENTION DEFICIT HYPERACTIVITY DISORDER), INATTENTIVE TYPE: ICD-10-CM

## 2020-07-03 PROCEDURE — 99214 OFFICE O/P EST MOD 30 MIN: CPT | Mod: 95 | Performed by: NURSE PRACTITIONER

## 2020-07-03 RX ORDER — BUPROPION HYDROCHLORIDE 300 MG/1
300 TABLET ORAL EVERY MORNING
Qty: 30 TABLET | Refills: 3 | Status: SHIPPED | OUTPATIENT
Start: 2020-07-03 | End: 2021-07-14

## 2020-08-05 ENCOUNTER — INFUSION THERAPY VISIT (OUTPATIENT)
Dept: INFUSION THERAPY | Facility: CLINIC | Age: 30
End: 2020-08-05
Attending: NURSE PRACTITIONER
Payer: COMMERCIAL

## 2020-08-05 VITALS — HEART RATE: 90 BPM | OXYGEN SATURATION: 98 % | SYSTOLIC BLOOD PRESSURE: 111 MMHG | DIASTOLIC BLOOD PRESSURE: 74 MMHG

## 2020-08-05 DIAGNOSIS — F11.20 OPIOID USE DISORDER, MODERATE, DEPENDENCE (H): Primary | ICD-10-CM

## 2020-08-05 PROCEDURE — 25000128 H RX IP 250 OP 636: Mod: ZF | Performed by: NURSE PRACTITIONER

## 2020-08-05 PROCEDURE — 96372 THER/PROPH/DIAG INJ SC/IM: CPT

## 2020-08-05 PROCEDURE — 25000125 ZZHC RX 250: Mod: ZF | Performed by: NURSE PRACTITIONER

## 2020-08-05 RX ORDER — LIDOCAINE HYDROCHLORIDE 10 MG/ML
2 INJECTION, SOLUTION EPIDURAL; INFILTRATION; INTRACAUDAL; PERINEURAL ONCE
Status: COMPLETED | OUTPATIENT
Start: 2020-08-05 | End: 2020-08-05

## 2020-08-05 RX ORDER — LIDOCAINE HYDROCHLORIDE 10 MG/ML
2 INJECTION, SOLUTION EPIDURAL; INFILTRATION; INTRACAUDAL; PERINEURAL ONCE
Status: CANCELLED | OUTPATIENT
Start: 2020-08-06

## 2020-08-05 RX ADMIN — LIDOCAINE HYDROCHLORIDE 2 ML: 10 INJECTION, SOLUTION EPIDURAL; INFILTRATION; INTRACAUDAL; PERINEURAL at 13:27

## 2020-08-05 RX ADMIN — BUPRENORPHINE 100 MG: 100 SOLUTION SUBCUTANEOUS at 13:30

## 2020-08-05 ASSESSMENT — PAIN SCALES - GENERAL: PAINLEVEL: NO PAIN (0)

## 2020-08-05 NOTE — LETTER
8/5/2020         RE: Bebeto Alvarado  [address] [facility] [address]        Dear Colleague,    Thank you for referring your patient, Bebeto Alvarado, to the [facility]AT[facility] [place] SPECIALTY AND PROCEDURE. Please see a copy of my visit note below.    Nursing Note  Bebeto Alvarado [facility] for:   Chief Complaint   Patient presents with     Imm/Inj     Sublocade     During [facility]'s [facility] appointment, orders from Dr Coppola were completed.  Frequency: every 4 weeks    Progress note:  Patient identification verified by name and date of birth.  Assessment completed.  Vitals recorded in Doc Flowsheets.  Patient was provided with education regarding medication/procedure and possible side effects.  Patient verbalized understanding.     present during visit today: Not Applicable.    Treatment Conditions: Pt denies relapse.    Premedications: administered per order.    Drug Waste Record: No    Labs: were not ordered for this appointment.    Vascular access: none    Post Infusion Assessment:  Patient tolerated injection without incident.     Discharge Plan:   Follow up plan of care with: ongoing infusions at [facility].  Discharge instructions were reviewed with patient.  Patient/representative verbalized understanding of discharge instructions and all questions answered.  [facility] discharged [facility] in stable condition.    CLAUDIA DOSS RN    Administrations This Visit     buprenorphine ER (SUBLOCADE) syringe 100 mg     Admin Date  08/05/2020 Action  Given Dose  100 mg Route  Subcutaneous Administered By  Claudia Doss RN          lidocaine (PF) (XYLOCAINE) 1 % injection 2 mL     Admin Date  08/05/2020 Action  Given Dose  2 mL Route  Subcutaneous Administered By  Claudia Doss RN                /74   Pulse 90   SpO2 98%          Again, thank you for allowing me to participate in the care of your patient.        Sincerely,        [facility]

## 2020-08-05 NOTE — PROGRESS NOTES
Nursing Note  Bebeto Alvarado presents [facility] for:   Chief Complaint   Patient presents with     Imm/Inj     Sublocade     During [facility]'s [facility] appointment, orders from Dr Coppola were completed.  Frequency: every 4 weeks    Progress note:  Patient identification verified by name and date of birth.  Assessment completed.  Vitals recorded in Doc Flowsheets.  Patient was provided with education regarding medication/procedure and possible side effects.  Patient verbalized understanding.     present during visit today: Not Applicable.    Treatment Conditions: Pt denies relapse.    Premedications: administered per order.    Drug Waste Record: No    Labs: were not ordered for this appointment.    Vascular access: none    Post Infusion Assessment:  Patient tolerated injection without incident.     Discharge Plan:   Follow up plan of care with: ongoing infusions [facility].  Discharge instructions were reviewed with patient.  Patient/representative verbalized understanding of discharge instructions and all questions answered.  [facility] in stable condition.    CLAUDIA DOSS RN    Administrations This Visit     buprenorphine ER (SUBLOCADE) syringe 100 mg     Admin Date  08/05/2020 Action  Given Dose  100 mg Route  Subcutaneous Administered By  Claudia Doss RN          lidocaine (PF) (XYLOCAINE) 1 % injection 2 mL     Admin Date  08/05/2020 Action  Given Dose  2 mL Route  Subcutaneous Administered By  Claudia Doss RN                /74   Pulse 90   SpO2 98%

## 2021-01-01 ENCOUNTER — OFFICE VISIT (OUTPATIENT)
Dept: BEHAVIORAL HEALTH | Facility: CLINIC | Age: 31
End: 2021-01-01

## 2021-01-01 ENCOUNTER — OFFICE VISIT (OUTPATIENT)
Dept: BEHAVIORAL HEALTH | Facility: CLINIC | Age: 31
End: 2021-01-01
Payer: COMMERCIAL

## 2021-01-01 ENCOUNTER — APPOINTMENT (OUTPATIENT)
Dept: BEHAVIORAL HEALTH | Facility: CLINIC | Age: 31
End: 2021-01-01
Payer: COMMERCIAL

## 2021-01-01 VITALS — DIASTOLIC BLOOD PRESSURE: 79 MMHG | SYSTOLIC BLOOD PRESSURE: 136 MMHG | HEART RATE: 91 BPM

## 2021-01-01 VITALS — SYSTOLIC BLOOD PRESSURE: 160 MMHG | HEART RATE: 106 BPM | DIASTOLIC BLOOD PRESSURE: 80 MMHG

## 2021-01-01 VITALS — DIASTOLIC BLOOD PRESSURE: 75 MMHG | SYSTOLIC BLOOD PRESSURE: 163 MMHG | HEART RATE: 102 BPM

## 2021-01-01 DIAGNOSIS — F17.200 TOBACCO USE DISORDER: ICD-10-CM

## 2021-01-01 DIAGNOSIS — F11.20 OPIOID USE DISORDER, SEVERE, DEPENDENCE (H): Primary | ICD-10-CM

## 2021-01-01 DIAGNOSIS — F11.20 OPIOID USE DISORDER, SEVERE, DEPENDENCE (H): ICD-10-CM

## 2021-01-01 DIAGNOSIS — F33.1 MODERATE EPISODE OF RECURRENT MAJOR DEPRESSIVE DISORDER (H): ICD-10-CM

## 2021-01-01 LAB — MISCELLANEOUS TEST 1 (ARUP): NORMAL

## 2021-01-01 PROCEDURE — 99214 OFFICE O/P EST MOD 30 MIN: CPT | Performed by: FAMILY MEDICINE

## 2021-01-01 PROCEDURE — 99215 OFFICE O/P EST HI 40 MIN: CPT | Performed by: FAMILY MEDICINE

## 2021-01-01 RX ORDER — BUPRENORPHINE AND NALOXONE 8; 2 MG/1; MG/1
1 FILM, SOLUBLE BUCCAL; SUBLINGUAL EVERY EVENING
Qty: 15 FILM | Refills: 0 | Status: SHIPPED | OUTPATIENT
Start: 2021-01-01 | End: 2021-01-01

## 2021-01-01 RX ORDER — BUPRENORPHINE AND NALOXONE 12; 3 MG/1; MG/1
1 FILM, SOLUBLE BUCCAL; SUBLINGUAL EVERY MORNING
Qty: 15 FILM | Refills: 0 | Status: SHIPPED | OUTPATIENT
Start: 2021-01-01 | End: 2021-01-01

## 2021-01-01 RX ORDER — BUPROPION HYDROCHLORIDE 300 MG/1
300 TABLET ORAL EVERY MORNING
Qty: 30 TABLET | Refills: 1 | COMMUNITY
Start: 2021-01-01 | End: 2022-01-01

## 2021-01-01 RX ORDER — BUPRENORPHINE AND NALOXONE 12; 3 MG/1; MG/1
1 FILM, SOLUBLE BUCCAL; SUBLINGUAL 2 TIMES DAILY
Qty: 30 FILM | Refills: 0 | Status: SHIPPED | OUTPATIENT
Start: 2021-01-01 | End: 2022-01-01

## 2021-05-25 ENCOUNTER — RECORDS - HEALTHEAST (OUTPATIENT)
Dept: ADMINISTRATIVE | Facility: CLINIC | Age: 31
End: 2021-05-25

## 2021-06-11 ENCOUNTER — OFFICE VISIT (OUTPATIENT)
Dept: BEHAVIORAL HEALTH | Facility: CLINIC | Age: 31
End: 2021-06-11
Payer: COMMERCIAL

## 2021-06-11 VITALS
DIASTOLIC BLOOD PRESSURE: 64 MMHG | HEART RATE: 73 BPM | BODY MASS INDEX: 26.68 KG/M2 | SYSTOLIC BLOOD PRESSURE: 110 MMHG | WEIGHT: 170 LBS | HEIGHT: 67 IN

## 2021-06-11 DIAGNOSIS — F17.200 TOBACCO USE DISORDER: ICD-10-CM

## 2021-06-11 DIAGNOSIS — F11.20 OPIOID USE DISORDER, SEVERE, DEPENDENCE (H): Primary | ICD-10-CM

## 2021-06-11 PROCEDURE — 99215 OFFICE O/P EST HI 40 MIN: CPT | Performed by: FAMILY MEDICINE

## 2021-06-11 RX ORDER — CLONIDINE HYDROCHLORIDE 0.1 MG/1
TABLET ORAL
COMMUNITY
Start: 2021-05-28 | End: 2021-08-12

## 2021-06-11 RX ORDER — GABAPENTIN 300 MG/1
CAPSULE ORAL
COMMUNITY
Start: 2021-05-28 | End: 2021-08-31

## 2021-06-11 RX ORDER — BUPRENORPHINE AND NALOXONE 12; 3 MG/1; MG/1
1 FILM, SOLUBLE BUCCAL; SUBLINGUAL DAILY
Qty: 10 FILM | Refills: 0 | Status: SHIPPED | OUTPATIENT
Start: 2021-06-11 | End: 2021-06-11

## 2021-06-11 RX ORDER — HYDROXYZINE PAMOATE 25 MG/1
CAPSULE ORAL
COMMUNITY
Start: 2021-05-28 | End: 2021-08-12

## 2021-06-11 RX ORDER — BUPRENORPHINE AND NALOXONE 12; 3 MG/1; MG/1
1 FILM, SOLUBLE BUCCAL; SUBLINGUAL DAILY
Qty: 10 FILM | Refills: 0 | Status: SHIPPED | OUTPATIENT
Start: 2021-06-11 | End: 2021-07-14

## 2021-06-11 ASSESSMENT — MIFFLIN-ST. JEOR: SCORE: 1684.74

## 2021-06-11 NOTE — PROGRESS NOTES
[facility] Initial Visit    ASSESSMENT/PLAN                                                      1. Opioid use disorder, severe, dependence (H)  Pt with 10+ yr h/o opioid use including IV heroin/fentanyl, last use 6/9/21.    Symptoms uncontrolled on buprenorphine 8mg/day  Increase buprenorphine to 12mg/day  Proceed with treatment at [facility] and follow counselors' recommendations  Pt declined baseline labs and ID testing today, will order at future visit  - naloxone (NARCAN) 4 MG/0.1ML nasal spray; Spray 1 spray (4 mg) into one nostril alternating nostrils as needed for opioid reversal every 2-3 minutes until assistance arrives  Dispense: 0.2 mL; Refill: 11  - Buprenorphine HCl-Naloxone HCl (SUBOXONE) 12-3 MG FILM per film; Place 1 Film under the tongue daily  Dispense: 10 Film; Refill: 0    2. Tobacco use disorder  Pt states he has NRT to use he were to decide this    Return in about 1 week (around 6/18/2021).      SUBJECTIVE                                                      CC/HPI:  Bebeto Alvarado is a 30 year old male with PMH PSUD primarily opioids on buprenorphine who presents to the Recovery Clinic for initial visit.      History of use/addiction :  Opioids: Pt started using opioids 2009 using opioid pills, then smoking heroin 2011, daily heroin use 2015,  then IV use starting 2019.  Reports last use 6/9/21.  He is smoking 1/4 ppd.  Denies any other substance use currently  IV drug use: Yes: most recently 6/9/21   History of overdose: Yes: 2019  Previous treatments : Yes: multiple, recently left [facility] and starting [facility]; h/o buprenorphine treatment including XR buprenorphine in 2020  Longest period of sobriety:6-9 months on buprenorphine  Medical complications related to substance use: none known  Hepatitis C Status  11/14/19 negative  HIV Status 11/14/19 negative  Withdrawal symptoms: No        PAST PSYCHIATRIC HISTORY:  Diagnoses- depression  Suicide Attempts: No    Hospitalizations: No     [place] Prescription Drug Monitoring Program Reviewed:  Yes; as expected        Past Medical History:   Diagnosis Date     Opiate abuse, continuous (H)      Tobacco use        No past surgical history on file.    Medications:  buPROPion (WELLBUTRIN XL) 300 MG 24 hr tablet, Take 1 tablet (300 mg) by mouth every morning (Patient not taking: Reported on 6/11/2021)  cloNIDine (CATAPRES) 0.1 MG tablet,   gabapentin (NEURONTIN) 300 MG capsule,   hydrOXYzine (VISTARIL) 25 MG capsule,   naloxone (NARCAN) 4 MG/0.1ML nasal spray, Spray 1 spray (4 mg) into one nostril alternating nostrils as needed for opioid reversal every 2-3 minutes until assistance arrives    No current facility-administered medications on file prior to visit.       Family History   Problem Relation Age of Onset     Diabetes Mother      Hypertension Mother      No Known Problems Father      No Known Problems Brother      Suicide Other        Allergies   Allergen Reactions     Codeine Unknown     Pt has been told.         Social History  Housing status: [facility]  Employment status: Unemployed, not seeking work  Relationship status: Single  Children: no children          REVIEW OF SYSTEMS:  General: no acute withdrawal symptoms.  No recent infections or fever  Eyes:  No vision concerns.  No double vision.    Resp: No coughing, wheezing or shortness of breath  CV: No chest pains or palpitations  GI: No nausea, vomiting, abdominal pain, diarrhea.  No constipation  : No urinary frequency or dysuria    Musculoskeletal: No significant muscle or joint pains other than as above.  No edema  Neurologic: No numbness, tingling, weakness, problems with balance or coordination  Psychiatric: No acute concerns other than as above. See mental status exam for more information.   Skin: No rashes or areas of acute infection    OBJECTIVE                                                      /64   Pulse 73   Ht 1.702 m  "(5' 7\")   Wt 77.1 kg (170 lb)   BMI 26.63 kg/m      Physical Exam  HENT:      Head: Normocephalic and atraumatic.   Eyes:      General: No scleral icterus.     Conjunctiva/sclera: Conjunctivae normal.   Pulmonary:      Effort: Pulmonary effort is normal.   Neurological:      Mental Status: He is alert and oriented to person, place, and time.      Coordination: Coordination is intact.      Gait: Gait is intact.   Psychiatric:         Attention and Perception: Attention and perception normal.         Mood and Affect: Mood normal. Affect is blunt.         Speech: Speech normal.         Behavior: Behavior is cooperative.         Thought Content: Thought content normal. Thought content does not include suicidal ideation.         Cognition and Memory: Cognition normal.      Comments: Insight and judgement are fair         Labs:    UDS: buprenorphine and morphine  *POC urine drug screen does not screen for Fentanyl    No results found for this or any previous visit (from the past 240 hour(s)).        Patient counseling completed today:  Discussed mechanism of action, potential risks/benefits/side effects of medications and other recommendations above.   Recommend pt keep naloxone in their possession and reviewed other aspects of harm reduction to reduce risk of overdose with return to use.   Recommended avoiding concurrent use of alcohol, benzodiazepines or other sedatives with buprenorphine or other opioids.  Discussed importance of avoiding isolation, building a network of supportive relationships, avoiding people/places/things associated with past use to reduce risk of relapse; including motivational interviewing regarding psychosocial treatment for addiction.     At least 60 min spent in review of medical record,  review, obtaining histories, reviewing symptoms, discussing pt's goals for treatment, counseling noted above.    CLAUDIA CHRISTOPHER MD    [facility]  [address] [place] [address] " [phone]  [phone]

## 2021-06-11 NOTE — NURSING NOTE
[facility]      Rooming information:  Last use of illicit opioids: 6/9/2021  Narcan currently available: No  Other recent substance use:    NICOTINE-Yes:    Denies  If using nicotine, ready to quit? Yes: has patched    Point of care urine drug screen positive for: buprenorphine and morphine  *POC urine drug screen does not screen for Fentanyl    Insurance needs: active    Housing needs: at [facility]    [employer]olvement reggieies (please obtain ANAIS if pt would like to include)    Primary care provider: NEEL Tariq CNP     Mental health provider: marylin (MH referral if needed)    Janet Brice CMA  June 11, 2021  3:28 PM

## 2021-07-14 ENCOUNTER — OFFICE VISIT (OUTPATIENT)
Dept: BEHAVIORAL HEALTH | Facility: CLINIC | Age: 31
End: 2021-07-14
Payer: COMMERCIAL

## 2021-07-14 VITALS — DIASTOLIC BLOOD PRESSURE: 74 MMHG | HEART RATE: 84 BPM | SYSTOLIC BLOOD PRESSURE: 125 MMHG

## 2021-07-14 DIAGNOSIS — F11.20 OPIOID USE DISORDER, MODERATE, DEPENDENCE (H): Primary | ICD-10-CM

## 2021-07-14 DIAGNOSIS — F17.200 TOBACCO USE DISORDER: ICD-10-CM

## 2021-07-14 DIAGNOSIS — F11.20 OPIOID USE DISORDER, MODERATE, DEPENDENCE (H): ICD-10-CM

## 2021-07-14 DIAGNOSIS — G47.00 INSOMNIA, UNSPECIFIED TYPE: ICD-10-CM

## 2021-07-14 DIAGNOSIS — F43.23 ADJUSTMENT DISORDER WITH MIXED ANXIETY AND DEPRESSED MOOD: Primary | ICD-10-CM

## 2021-07-14 DIAGNOSIS — F41.9 ANXIETY: ICD-10-CM

## 2021-07-14 PROCEDURE — 99207 PR CDG-CODE CATEGORY CHANGED: CPT | Performed by: SOCIAL WORKER

## 2021-07-14 PROCEDURE — 90833 PSYTX W PT W E/M 30 MIN: CPT | Performed by: SOCIAL WORKER

## 2021-07-14 PROCEDURE — 99214 OFFICE O/P EST MOD 30 MIN: CPT | Performed by: FAMILY MEDICINE

## 2021-07-14 RX ORDER — LIDOCAINE HYDROCHLORIDE 10 MG/ML
2 INJECTION, SOLUTION EPIDURAL; INFILTRATION; INTRACAUDAL; PERINEURAL ONCE
Status: CANCELLED
Start: 2021-07-14 | End: 2021-07-14

## 2021-07-14 RX ORDER — METHYLPREDNISOLONE SODIUM SUCCINATE 125 MG/2ML
125 INJECTION, POWDER, LYOPHILIZED, FOR SOLUTION INTRAMUSCULAR; INTRAVENOUS
Status: CANCELLED
Start: 2021-07-14

## 2021-07-14 RX ORDER — MIRTAZAPINE 15 MG/1
TABLET, FILM COATED ORAL
Qty: 30 TABLET | Refills: 1 | Status: SHIPPED | OUTPATIENT
Start: 2021-07-14 | End: 2021-09-14

## 2021-07-14 RX ORDER — ALBUTEROL SULFATE 0.83 MG/ML
2.5 SOLUTION RESPIRATORY (INHALATION)
Status: CANCELLED | OUTPATIENT
Start: 2021-07-14

## 2021-07-14 RX ORDER — NALOXONE HYDROCHLORIDE 0.4 MG/ML
0.2 INJECTION, SOLUTION INTRAMUSCULAR; INTRAVENOUS; SUBCUTANEOUS
Status: CANCELLED | OUTPATIENT
Start: 2021-07-14

## 2021-07-14 RX ORDER — EPINEPHRINE 1 MG/ML
0.3 INJECTION, SOLUTION, CONCENTRATE INTRAVENOUS EVERY 5 MIN PRN
Status: CANCELLED | OUTPATIENT
Start: 2021-07-14

## 2021-07-14 RX ORDER — ALBUTEROL SULFATE 90 UG/1
1-2 AEROSOL, METERED RESPIRATORY (INHALATION)
Status: CANCELLED
Start: 2021-07-14

## 2021-07-14 RX ORDER — DIPHENHYDRAMINE HYDROCHLORIDE 50 MG/ML
50 INJECTION INTRAMUSCULAR; INTRAVENOUS
Status: CANCELLED
Start: 2021-07-14

## 2021-07-14 RX ORDER — MEPERIDINE HYDROCHLORIDE 25 MG/ML
25 INJECTION INTRAMUSCULAR; INTRAVENOUS; SUBCUTANEOUS EVERY 30 MIN PRN
Status: CANCELLED | OUTPATIENT
Start: 2021-07-14

## 2021-07-14 RX ORDER — BUPRENORPHINE AND NALOXONE 12; 3 MG/1; MG/1
FILM, SOLUBLE BUCCAL; SUBLINGUAL
Qty: 15 FILM | Refills: 0 | Status: SHIPPED | OUTPATIENT
Start: 2021-07-14 | End: 2021-08-12

## 2021-07-14 NOTE — PROGRESS NOTES
He is in residential treatment-and he is wanting to switch his services to [facility] so that he can get the injection-he is already thinking about when he will get off the Soboxone-he usually is seen in regular addiction services-he stated that he has a lot of knowledge of recovery-he talked about the why-he has a girlfriend and she has been sober for 2 years and he wants to prove to the self and his family that he values relationships and his ability to meet challenges and have success-regarding sleeping he stated that regarding sleeping he has difficulty with falling asleep-his mind is going and his anxiety is up-he has a prn to take for his anxiety-his anxiety during the day is manageable stated that he is dealing with some depression at this time with regret and shame-appetite has been good last lately-no symptoms of withdraw-last use was 2 days ago-going on the shot will give him more accountability-

## 2021-07-14 NOTE — NURSING NOTE
[facility]      Rooming information:  Last use of illicit opioids: 7/12/2021  Taking buprenorphine? Yes:  As prescribed? Yes:   Side effects related to buprenorphine (constipation, dry mouth, sedation?) No   Narcan currently available: Yes  Other recent substance use:    NICOTINE-Yes:    Denies  If using nicotine, ready to quit? Yes: has patches    Point of care urine drug screen positive for: buprenorphine and morphine  *POC urine drug screen does not screen for Fentanyl      Insurance needs: active    Housing needs: [place]    Contact information up to date? yes    3[employer] Involvement none today (please obtain ANAIS if pt would like to include)    Primary care provider: NEEL Tariq CNP     Mental health provider: marylin (MH referral if needed)    Janet Brice CMA  July 14, 2021  11:13 AM

## 2021-07-14 NOTE — PROGRESS NOTES
[facility] Return Visit    ASSESSMENT/PLAN                                                    1. Opioid use disorder, moderate, dependence (H)  Pt currently taking buprenorphine 8mg/day (4mg bid) with uncontrolled symptoms, last use 7/12/21.    Recommend increasing buprenorphine to 12mg/day.  Encouraged once daily dosing which pt declined at this time, requests to take 6mg bid due to side effect of sedation.   Pt is interested in resuming XR buprenorphine; Sublocade 300mg injection scheduled for 7/22/21  Discontinue sublingual buprenorphine after Sublocade injections initiated.  Pt confirmed he has naloxone available to him.   Continue treatment through [facility] and follow recommendations.    - Buprenorphine HCl-Naloxone HCl (SUBOXONE) 12-3 MG FILM per film; 1/2 film sl bid  Dispense: 15 Film; Refill: 0  - Asymptomatic COVID-19 Virus (Coronavirus) by PCR Nasopharyngeal; Future    2. Tobacco use disorder  Pt not interested in quitting at this time, continue MI at future visits    3. Anxiety  Starting mirtazapine  Individual therapy with [facility] visits  - mirtazapine (REMERON) 15 MG tablet; 1/2-1 tablet po qhs  Dispense: 30 tablet; Refill: 1    4. Insomnia, unspecified type  Starting mirtazapine as noted  - mirtazapine (REMERON) 15 MG tablet; 1/2-1 tablet po qhs  Dispense: 30 tablet; Refill: 1    Return for Follow up, with me, in person one week after first Sublocade injection.      SUBJECTIVE                                                      CC/HPI:  Bebeto Alvarado is a 30 year old male with PMH PSUD primarily opioids on buprenorphine who presents to the Recovery Clinic for return visit.      History of use/addiction :  Opioids: Pt started using opioids 2009 using opioid pills, then smoking heroin 2011, daily heroin use 2015,  then IV use starting 2019.     IV drug use: Yes: most recently 7/12/21   History of overdose: Yes: 2019  Previous treatments : Yes: multiple; currently  with [facility]; h/o buprenorphine treatment including XR buprenorphine in 2020  Longest period of sobriety:6-9 months on buprenorphine  Medical complications related to substance use: none known  Hepatitis C Status  11/14/19 negative  HIV Status 11/14/19 negative      I last met with pt on 6/11/21, his initial  visit.   A/P at that time was:  1. Opioid use disorder, severe, dependence (H)  Pt with 10+ yr h/o opioid use including IV heroin/fentanyl, last use 6/9/21.    Symptoms uncontrolled on buprenorphine 8mg/day  Increase buprenorphine to 12mg/day  Proceed with treatment at [facility] and follow counselors' recommendations  Pt declined baseline labs and ID testing today, will order at future visit  - naloxone (NARCAN) 4 MG/0.1ML nasal spray; Spray 1 spray (4 mg) into one nostril alternating nostrils as needed for opioid reversal every 2-3 minutes until assistance arrives  Dispense: 0.2 mL; Refill: 11  - Buprenorphine HCl-Naloxone HCl (SUBOXONE) 12-3 MG FILM per film; Place 1 Film under the tongue daily  Dispense: 10 Film; Refill: 0    2. Tobacco use disorder  Pt states he has NRT to use he were to decide this        Today, pt states he has continued to have intermittent episodes of IV heroin/fentanyl use; last use 7/12/21 and reports 2 days of use one month prior to that.  He is currently prescribed buprenorphine 8mg/day (taken 4mg bid) by provider from [facility].  Pt states he would like to instead continue care through Recovery Clinic in order to resume XR buprenorphine injections.  He entered treatment through [facility] ~7/7/21.     He endorses symptoms of anxiety and difficulty sleeping, and continues to take meds prescribed for opioid withdrawal symptoms intermittently for sleep and anxiety.       [place] Prescription Drug Monitoring Program Reviewed:  Yes; as expected        Past Medical History:   Diagnosis Date     Opiate abuse, continuous (H)      Tobacco use      PAST PSYCHIATRIC  HISTORY:  Diagnoses- depression  Suicide Attempts: No   Hospitalizations: No     No past surgical history on file.    Medications:  cloNIDine (CATAPRES) 0.1 MG tablet,   gabapentin (NEURONTIN) 300 MG capsule,   hydrOXYzine (VISTARIL) 25 MG capsule,   naloxone (NARCAN) 4 MG/0.1ML nasal spray, Spray 1 spray (4 mg) into one nostril alternating nostrils as needed for opioid reversal every 2-3 minutes until assistance arrives    No current facility-administered medications on file prior to visit.      Family History   Problem Relation Age of Onset     Diabetes Mother      Hypertension Mother      No Known Problems Father      No Known Problems Brother      Suicide Other        Allergies   Allergen Reactions     Codeine Unknown     Pt has been told.         Social History  Housing status: [facility]  Employment status: Unemployed, not seeking work  Relationship status: Single has a girlfriend who has child(misty)  Children: no children          REVIEW OF SYSTEMS:  No other concerns    OBJECTIVE                                                      /74   Pulse 84     Physical Exam  HENT:      Head: Normocephalic and atraumatic.   Eyes:      General: No scleral icterus.     Conjunctiva/sclera: Conjunctivae normal.   Pulmonary:      Effort: Pulmonary effort is normal.   Neurological:      Mental Status: He is alert and oriented to person, place, and time.      Coordination: Coordination is intact.      Gait: Gait is intact.   Psychiatric:         Attention and Perception: Attention and perception normal.         Mood and Affect: Affect normal. Mood is anxious.         Speech: Speech normal.         Behavior: Behavior is cooperative.         Thought Content: Thought content normal. Thought content does not include suicidal ideation.         Cognition and Memory: Cognition normal.      Comments: Insight and judgement are fair         Labs:    UDS: buprenorphine and morphine  *POC urine drug screen does not screen for  Fentanyl    No results found for this or any previous visit (from the past 240 hour(s)).        Patient counseling completed today:  Discussed mechanism of action, potential risks/benefits/side effects of medications and other recommendations above.   Recommend pt keep naloxone in their possession and reviewed other aspects of harm reduction to reduce risk of overdose with return to use.   Recommended avoiding concurrent use of alcohol, benzodiazepines or other sedatives with buprenorphine or other opioids.  Discussed importance of avoiding isolation, building a network of supportive relationships, avoiding people/places/things associated with past use to reduce risk of relapse; including motivational interviewing regarding psychosocial treatment for addiction.     At least 30 min spent in review of medical record,  review, obtaining histories, reviewing symptoms, discussing pt's goals for treatment, counseling noted above.    CLAUDIA CHRISTOPHER MD    [place] [id number] [place] [id number] [phone]

## 2021-07-14 NOTE — PROGRESS NOTES
[facility]   July 14, 2021      Behavioral Health Clinician Progress Note    Patient Name: Bebeto Alvarado           Service Type:  Individual      Service Location:   Face to Face in Clinic     Session Start Time: 11:20am  Session End Time: 11:39am    Session Length: 16 - 37      Attendees: Patient    Visit Activities (Refresh list every visit): psychotherapy     Diagnostic Assessment Date: not completed yet  Treatment Plan Review Date: Not completed yet  See Flowsheets for today's PHQ-9 and WANDER-7 results  Previous PHQ-9:   PHQ-9 SCORE 11/29/2019 12/5/2019 4/10/2020   PHQ-9 Total Score 11 14 7     Previous WANDER-7:   WANDER-7 SCORE 11/29/2019 4/10/2020   Total Score 4 8       JUSTUS LEVEL:  No flowsheet data found.    DATA  Extended Session (60+ minutes): No  Interactive Complexity: No  Crisis: No  [facility] Patient: No    Treatment Objective(s) Addressed in This Session:  Target Behavior(s): mental health and additction     Depressed Mood: Increase interest, engagement, and pleasure in doing things  Decrease frequency and intensity of feeling down, depressed, hopeless  Improve quantity and quality of night time sleep / decrease daytime naps  Identify negative self-talk and behaviors: challenge core beliefs, myths, and actions  Improve concentration, focus, and mindfulness in daily activities   Anxiety: will experience a reduction in anxiety and will develop more effective coping skills to manage anxiety symptoms  Alcohol / Substance Use: will discuss/consider potential need for formal substance use evaluation, treatment and/or support  continue to make healthy choices regarding substance use and engage in activities / supportive services that promote sobriety    Current Stressors / Issues:  He is in residential treatment-and he is wanting to switch his services to [facility] so that he can get the injection-he is already thinking about when he will get off the Soboxone-he usually is seen in regular  addiction services-he stated that he has a lot of knowledge of recovery-he talked about the why-he has a girlfriend and she has been sober for 2 years and he wants to prove to the self and his family that he values relationships and his ability to meet challenges and have success-regarding sleeping he stated that regarding sleeping he has difficulty with falling asleep-his mind is going and his anxiety is up-he has a prn to take for his anxiety-his anxiety during the day is manageable stated that he is dealing with some depression at this time with regret and shame-appetite has been good last lately-no symptoms of withdraw-last use was 2 days ago-going on the shot will give him more accountability-      Progress on Treatment Objective(s) / Homework:  No improvement - ACTION (Actively working towards change); Intervened by reinforcing change plan / affirming steps taken    Motivational Interviewing    MI Intervention: Expressed Empathy/Understanding, Supported Autonomy, Collaboration, Evocation, Permission to raise concern or advise, Open-ended questions, Reflections: simple and complex and Change talk (evoked)     Change Talk Expressed by the Patient: Desire to change Ability to change Reasons to change Need to change Committment to change Activation Taking steps    Provider Response to Change Talk: E - Evoked more info from patient about behavior change, A - Affirmed patient's thoughts, decisions, or attempts at behavior change, R - Reflected patient's change talk and S - Summarized patient's change talk statements      Care Plan review completed: No    Medication Review:  No changes to current psychiatric medication(s)    Medication Compliance:  NA    Changes in Health Issues:   None reported    Chemical Use Review:   Substance Use: Chemical use reviewed, no active concerns identified      Tobacco Use: not reported    Assessment: Current Emotional / Mental Status (status of significant symptoms):  Risk status  (Self / Other harm or suicidal ideation)  Patient denies a history of suicidal ideation, suicide attempts, self-injurious behavior, homicidal ideation, homicidal behavior and and other safety concerns  Patient denies current fears or concerns for personal safety.  Patient denies current or recent suicidal ideation or behaviors.  Patient denies current or recent homicidal ideation or behaviors.  Patient denies current or recent self injurious behavior or ideation.  Patient denies other safety concerns.  A safety and risk management plan has not been developed at this time, however patient was encouraged to call [place] [phone] should there be a change in any of these risk factors.    Appearance:   Appropriate   Eye Contact:   Good   Psychomotor Behavior: Normal   Attitude:   Cooperative   Orientation:   All  Speech   Rate / Production: Normal    Volume:  Normal   Mood:    Normal  Affect:    Appropriate   Thought Content:  Clear   Thought Form:  Coherent  Goal Directed  Logical   Insight:    Good     Diagnoses:  1. Adjustment disorder with mixed anxiety and depressed mood    2. Opioid use disorder, moderate, dependence (H)        Collateral Reports Completed:  Not Applicable    Plan: (Homework, other):  Patient was given information about behavioral services and encouraged to schedule a follow up appointment with the clinic [facility] as needed.  He was also given information about mental health symptoms and treatment options  and strategies to maintain sobriety.  CD Recommendations: Maintain Sobriety.  Jarret Galicia [facility] Psychotherapist

## 2021-07-28 ENCOUNTER — TELEPHONE (OUTPATIENT)
Dept: BEHAVIORAL HEALTH | Facility: CLINIC | Age: 31
End: 2021-07-28

## 2021-07-28 NOTE — TELEPHONE ENCOUNTER
Writer called patient due to no show today at the Recovery Clinic, number in chart is not in service.

## 2021-08-10 ENCOUNTER — TRANSFERRED RECORDS (OUTPATIENT)
Dept: HEALTH INFORMATION MANAGEMENT | Facility: CLINIC | Age: 31
End: 2021-08-10

## 2021-08-10 NOTE — ADDENDUM NOTE
Encounter addended by: Sonia Dejesus on: 12/26/2019 8:26 AM   Actions taken: Charge Capture section accepted Rn spoke to Flores- per Flores thoracic and lumbar 2 view xrays were done on th ept which showed possible lucency around the L3 screws. Per Tessa Tanner to order xr lumbar flex ex and then send all three exams for review to the office. Flores verbalized understanding,the pt has no worsening of pain or new symptoms at this time.

## 2021-08-12 ENCOUNTER — OFFICE VISIT (OUTPATIENT)
Dept: BEHAVIORAL HEALTH | Facility: CLINIC | Age: 31
End: 2021-08-12
Payer: COMMERCIAL

## 2021-08-12 VITALS — SYSTOLIC BLOOD PRESSURE: 134 MMHG | DIASTOLIC BLOOD PRESSURE: 81 MMHG | HEART RATE: 99 BPM

## 2021-08-12 DIAGNOSIS — F11.93 OPIOID WITHDRAWAL (H): ICD-10-CM

## 2021-08-12 DIAGNOSIS — F17.200 TOBACCO USE DISORDER: ICD-10-CM

## 2021-08-12 DIAGNOSIS — G47.00 INSOMNIA, UNSPECIFIED TYPE: ICD-10-CM

## 2021-08-12 DIAGNOSIS — F11.20 OPIOID USE DISORDER, MODERATE, DEPENDENCE (H): Primary | ICD-10-CM

## 2021-08-12 PROCEDURE — 99214 OFFICE O/P EST MOD 30 MIN: CPT | Performed by: FAMILY MEDICINE

## 2021-08-12 RX ORDER — BUPRENORPHINE AND NALOXONE 12; 3 MG/1; MG/1
FILM, SOLUBLE BUCCAL; SUBLINGUAL
Qty: 15 FILM | Refills: 0 | Status: SHIPPED | OUTPATIENT
Start: 2021-08-12 | End: 2021-08-31

## 2021-08-12 RX ORDER — HYDROXYZINE PAMOATE 25 MG/1
25-50 CAPSULE ORAL 4 TIMES DAILY PRN
Qty: 40 CAPSULE | Refills: 0 | Status: SHIPPED | OUTPATIENT
Start: 2021-08-12 | End: 2021-09-07

## 2021-08-12 RX ORDER — CLONIDINE HYDROCHLORIDE 0.1 MG/1
0.1 TABLET ORAL 4 TIMES DAILY PRN
Qty: 20 TABLET | Refills: 0 | Status: SHIPPED | OUTPATIENT
Start: 2021-08-12 | End: 2021-09-07

## 2021-08-12 RX ORDER — BUPRENORPHINE AND NALOXONE 12; 3 MG/1; MG/1
FILM, SOLUBLE BUCCAL; SUBLINGUAL
Qty: 10 FILM | Refills: 0 | Status: SHIPPED | OUTPATIENT
Start: 2021-08-12 | End: 2021-08-12

## 2021-08-12 NOTE — NURSING NOTE
[facility]      Rooming information:  Last use of illicit opioids: 8/10/2021  Taking buprenorphine? No, ran out a week ago  Narcan currently available: Yes  Other recent substance use:    NICOTINE-Yes:    THC  If using nicotine, ready to quit? Yes: has patches    Point of care urine drug screen positive for: buprenorphine, morphine and THC  *POC urine drug screen does not screen for Fentanyl      Insurance needs: active    Housing needs: stable     Contact information up to date? yes    3rd Party Involvement none today (please obtain ANAIS if pt would like to include)    Primary care provider: NEEL Tariq CNP     Mental health provider: marylin (MH referral if needed)    Janet Brice CMA  August 12, 2021  2:00 PM

## 2021-08-12 NOTE — PROGRESS NOTES
[facility] Return Visit    ASSESSMENT/PLAN                                                    1. Opioid use disorder, moderate, dependence (H)  Pt reports a 12 year history of using opioids, currently using heroin/fentanyl by iv route(s.)  Last reported use 8/10/21.  Pt is interested in starting buprenorphine for treatment.  We reviewed directions for home initiation of buprenorphine, including in the setting of fentanyl use.  We reviewed in detail use of other medications for treatment of opioid withdrawal symptoms as needed until stabilized on buprenorphine.  Baseline labs discussed, will obtain updated hcv/hiv screening next visit.    Continue buprenorphine SL 12mg/day after initiation.   Pt scheduled initial Sublocade 300mg for 8/23/21.  Discontinue sublingual buprenorphine 24hrs after initial Sublocade.   Continue treatment with [facility] and follow counselors' recommendations.     - Buprenorphine HCl-Naloxone HCl (SUBOXONE) 12-3 MG FILM per film; 1/2 film sl bid  Dispense: 15 Film; Refill: 0    2. Opioid withdrawal (H)  - hydrOXYzine (VISTARIL) 25 MG capsule; Take 1-2 capsules (25-50 mg) by mouth 4 times daily as needed for anxiety (withdrawal symptoms)  Dispense: 40 capsule; Refill: 0  - cloNIDine (CATAPRES) 0.1 MG tablet; Take 1 tablet (0.1 mg) by mouth 4 times daily as needed (withdrawal symptoms)  Dispense: 20 tablet; Refill: 0    3. Tobacco use disorder  Pt states he has patches and is interested in quitting    4. Insomnia, unspecified type  Continue mirtazapine 7.5-15mg qhs      Return in 11 days (on 8/23/2021) for Follow up, with me, in person on date of Sublocade .      SUBJECTIVE                                                      CC/HPI:  Bebeto Alvarado is a 30 year old male with PMH PSUD primarily opioids on buprenorphine who [facility] to the [facility] for return visit.      History of use/addiction :  Opioids: Pt started using opioids 2009 using opioid  pills, then progressed to smoking heroin 2011, daily heroin use began 2015,  then IV use starting 2019.     IV drug use: Yes: most recently 8/10/21 - denies sharing equipment  History of overdose: Yes: 2019  Previous treatments : Yes: multiple; currently with [facility]; h/o buprenorphine treatment including XR buprenorphine in 2020  Longest period of sobriety:6-9 months on buprenorphine  Medical complications related to substance use: none known  Hepatitis C Status  11/14/19 negative  HIV Status 11/14/19 negative      I last met with pt on 7/14/21  A/P at that time was:  1. Opioid use disorder, moderate, dependence (H)  Pt currently taking buprenorphine 8mg/day (4mg bid) with uncontrolled symptoms, last use 7/12/21.    Recommend increasing buprenorphine to 12mg/day.  Encouraged once daily dosing which pt declined at this time, requests to take 6mg bid due to side effect of sedation.   Pt is interested in resuming XR buprenorphine; Sublocade 300mg injection scheduled for 7/22/21  Discontinue sublingual buprenorphine after Sublocade injections initiated.  Pt confirmed he has naloxone available to him.   Continue treatment through [facility] and follow recommendations.    - Buprenorphine HCl-Naloxone HCl (SUBOXONE) 12-3 MG FILM per film; 1/2 film sl bid  Dispense: 15 Film; Refill: 0  - Asymptomatic COVID-19 Virus (Coronavirus) by PCR Nasopharyngeal; Future    2. Tobacco use disorder  Pt not interested in quitting at this time, continue MI at future visits    3. Anxiety  Starting mirtazapine  Individual therapy with [facility] visits  - mirtazapine (REMERON) 15 MG tablet; 1/2-1 tablet po qhs  Dispense: 30 tablet; Refill: 1    4. Insomnia, unspecified type  Starting mirtazapine as noted  - mirtazapine (REMERON) 15 MG tablet; 1/2-1 tablet po qhs  Dispense: 30 tablet; Refill: 1        Pt missed his follow-up appt scheduled 7/28/21.     Today, pt states he returned to use of heroin 8/8-8/10/21 prior to entering his new  sober house affiliated with [facility].  Had been at [facility] for a short time at the end of July/beginning of August.  While at [facility], he was treated with buprenorphine taper.  He has taken 2-6mg buprenorphine every few days since leaving [facility] from previous buprenorphine rx's. He would like to resume therapeutic buprenorphine dosing, and remains interested in transition to XR buprenrophine.  He is currently experiencing moderate opioid withdrawal symptoms including restlessness, yawning, sneezing, cold sweats, insomnia, nausea.    He states the mirtazapine works well for sleep.  He has reduced dose to 7.5mg due to excess sedation with 15mg.            [place] Prescription Drug Monitoring Program Reviewed:  Yes; as expected        Past Medical History:   Diagnosis Date     Opiate abuse, continuous (H)      Tobacco use      PAST PSYCHIATRIC HISTORY:  Diagnoses- depression  Suicide Attempts: No   Hospitalizations: No     No past surgical history on file.    Medications:  Buprenorphine HCl-Naloxone HCl (SUBOXONE) 12-3 MG FILM per film, 1/2 film sl bid  cloNIDine (CATAPRES) 0.1 MG tablet,   gabapentin (NEURONTIN) 300 MG capsule,   hydrOXYzine (VISTARIL) 25 MG capsule,   mirtazapine (REMERON) 15 MG tablet, 1/2-1 tablet po qhs  naloxone (NARCAN) 4 MG/0.1ML nasal spray, Spray 1 spray (4 mg) into one nostril alternating nostrils as needed for opioid reversal every 2-3 minutes until assistance arrives    No current facility-administered medications on file prior to visit.      Family History   Problem Relation Age of Onset     Diabetes Mother      Hypertension Mother      No Known Problems Father      No Known Problems Brother      Suicide Other        Allergies   Allergen Reactions     Codeine Unknown     Pt has been told.         Social History  Housing status: staying at a sober house through [facility]  Employment status: Unemployed, not seeking work  Relationship status: Single has a  girlfriend who has child(misty)  Children: no children          REVIEW OF SYSTEMS:  Pt c/o withdrawal symptoms including     OBJECTIVE                                                      /81   Pulse 99     Physical Exam  HENT:      Head: Normocephalic and atraumatic.   Eyes:      General: No scleral icterus.     Conjunctiva/sclera: Conjunctivae normal.   Pulmonary:      Effort: Pulmonary effort is normal.   Neurological:      Mental Status: He is alert and oriented to person, place, and time.      Coordination: Coordination is intact.      Gait: Gait is intact.   Psychiatric:         Attention and Perception: Attention and perception normal.         Mood and Affect: Affect normal. Mood is anxious.         Speech: Speech normal.         Behavior: Behavior is cooperative.         Thought Content: Thought content normal. Thought content does not include suicidal ideation.         Cognition and Memory: Cognition normal.      Comments: Insight and judgement are fair         Labs:    UDS: buprenorphine, morphine and THC  *POC urine drug screen does not screen for Fentanyl    No results found for this or any previous visit (from the past 240 hour(s)).        Patient counseling completed today:  Discussed mechanism of action, potential risks/benefits/side effects of medications and other recommendations above.   Discussed risk of precipitated withdrawal with initiation of buprenorphine in the presence of full opioid agonists. Recommend pt keep naloxone in their possession and reviewed other aspects of harm reduction to reduce risk of overdose with return to use.   Recommended avoiding concurrent use of alcohol, benzodiazepines or other sedatives with buprenorphine or other opioids.  Discussed importance of avoiding isolation, building a network of supportive relationships, avoiding people/places/things associated with past use to reduce risk of relapse; including motivational interviewing regarding psychosocial  treatment for addiction.     At least 30 min spent in review of medical record,  review, obtaining histories, reviewing symptoms, discussing pt's goals for treatment, counseling noted above.    CLAUDIA CHRISTOPHER MD    [place] [id number] [address] [phone]

## 2021-08-31 ENCOUNTER — TELEPHONE (OUTPATIENT)
Dept: BEHAVIORAL HEALTH | Facility: CLINIC | Age: 31
End: 2021-08-31

## 2021-08-31 ENCOUNTER — OFFICE VISIT (OUTPATIENT)
Dept: BEHAVIORAL HEALTH | Facility: CLINIC | Age: 31
End: 2021-08-31
Payer: COMMERCIAL

## 2021-08-31 ENCOUNTER — LAB (OUTPATIENT)
Dept: LAB | Facility: CLINIC | Age: 31
End: 2021-08-31
Payer: COMMERCIAL

## 2021-08-31 ENCOUNTER — HOSPITAL ENCOUNTER (OUTPATIENT)
Dept: BEHAVIORAL HEALTH | Facility: CLINIC | Age: 31
End: 2021-08-31
Attending: FAMILY MEDICINE
Payer: COMMERCIAL

## 2021-08-31 VITALS
BODY MASS INDEX: 28.25 KG/M2 | HEIGHT: 67 IN | SYSTOLIC BLOOD PRESSURE: 134 MMHG | TEMPERATURE: 97.6 F | OXYGEN SATURATION: 98 % | WEIGHT: 180 LBS | DIASTOLIC BLOOD PRESSURE: 69 MMHG | HEART RATE: 90 BPM

## 2021-08-31 VITALS — HEART RATE: 81 BPM | DIASTOLIC BLOOD PRESSURE: 70 MMHG | SYSTOLIC BLOOD PRESSURE: 133 MMHG

## 2021-08-31 DIAGNOSIS — F11.20 OPIOID USE DISORDER, MODERATE, DEPENDENCE (H): ICD-10-CM

## 2021-08-31 DIAGNOSIS — Z11.3 SCREEN FOR STD (SEXUALLY TRANSMITTED DISEASE): ICD-10-CM

## 2021-08-31 DIAGNOSIS — F11.93 OPIOID WITHDRAWAL (H): ICD-10-CM

## 2021-08-31 DIAGNOSIS — F11.20 OPIOID USE DISORDER, MODERATE, DEPENDENCE (H): Primary | ICD-10-CM

## 2021-08-31 DIAGNOSIS — F17.200 TOBACCO USE DISORDER: ICD-10-CM

## 2021-08-31 LAB
ALBUMIN SERPL-MCNC: 3.5 G/DL (ref 3.4–5)
ALP SERPL-CCNC: 87 U/L (ref 40–150)
ALT SERPL W P-5'-P-CCNC: 33 U/L (ref 0–70)
ANION GAP SERPL CALCULATED.3IONS-SCNC: 3 MMOL/L (ref 3–14)
AST SERPL W P-5'-P-CCNC: 26 U/L (ref 0–45)
BASOPHILS # BLD AUTO: 0.1 10E3/UL (ref 0–0.2)
BASOPHILS NFR BLD AUTO: 1 %
BILIRUB SERPL-MCNC: 0.2 MG/DL (ref 0.2–1.3)
BUN SERPL-MCNC: 19 MG/DL (ref 7–30)
CALCIUM SERPL-MCNC: 9 MG/DL (ref 8.5–10.1)
CHLORIDE BLD-SCNC: 105 MMOL/L (ref 94–109)
CO2 SERPL-SCNC: 32 MMOL/L (ref 20–32)
CREAT SERPL-MCNC: 0.98 MG/DL (ref 0.66–1.25)
EOSINOPHIL # BLD AUTO: 0.3 10E3/UL (ref 0–0.7)
EOSINOPHIL NFR BLD AUTO: 2 %
ERYTHROCYTE [DISTWIDTH] IN BLOOD BY AUTOMATED COUNT: 13.2 % (ref 10–15)
GFR SERPL CREATININE-BSD FRML MDRD: >90 ML/MIN/1.73M2
GLUCOSE BLD-MCNC: 95 MG/DL (ref 70–99)
HBV CORE AB SERPL QL IA: NONREACTIVE
HBV SURFACE AB SERPL IA-ACNC: 2.9 M[IU]/ML
HBV SURFACE AG SERPL QL IA: NONREACTIVE
HCT VFR BLD AUTO: 38.2 % (ref 40–53)
HCV AB SERPL QL IA: NONREACTIVE
HGB BLD-MCNC: 12.3 G/DL (ref 13.3–17.7)
HIV 1+2 AB+HIV1 P24 AG SERPL QL IA: NONREACTIVE
IMM GRANULOCYTES # BLD: 0.1 10E3/UL
IMM GRANULOCYTES NFR BLD: 0 %
LYMPHOCYTES # BLD AUTO: 3.2 10E3/UL (ref 0.8–5.3)
LYMPHOCYTES NFR BLD AUTO: 28 %
MCH RBC QN AUTO: 27.2 PG (ref 26.5–33)
MCHC RBC AUTO-ENTMCNC: 32.2 G/DL (ref 31.5–36.5)
MCV RBC AUTO: 84 FL (ref 78–100)
MONOCYTES # BLD AUTO: 0.9 10E3/UL (ref 0–1.3)
MONOCYTES NFR BLD AUTO: 8 %
NEUTROPHILS # BLD AUTO: 6.9 10E3/UL (ref 1.6–8.3)
NEUTROPHILS NFR BLD AUTO: 61 %
NRBC # BLD AUTO: 0 10E3/UL
NRBC BLD AUTO-RTO: 0 /100
PLATELET # BLD AUTO: 360 10E3/UL (ref 150–450)
POTASSIUM BLD-SCNC: 4.9 MMOL/L (ref 3.4–5.3)
PROT SERPL-MCNC: 7.2 G/DL (ref 6.8–8.8)
RBC # BLD AUTO: 4.53 10E6/UL (ref 4.4–5.9)
SARS-COV-2 RNA RESP QL NAA+PROBE: NEGATIVE
SODIUM SERPL-SCNC: 140 MMOL/L (ref 133–144)
T PALLIDUM AB SER QL: NONREACTIVE
WBC # BLD AUTO: 11.4 10E3/UL (ref 4–11)

## 2021-08-31 PROCEDURE — 87340 HEPATITIS B SURFACE AG IA: CPT

## 2021-08-31 PROCEDURE — 86704 HEP B CORE ANTIBODY TOTAL: CPT

## 2021-08-31 PROCEDURE — 87491 CHLMYD TRACH DNA AMP PROBE: CPT | Performed by: FAMILY MEDICINE

## 2021-08-31 PROCEDURE — 86706 HEP B SURFACE ANTIBODY: CPT

## 2021-08-31 PROCEDURE — 1002N00001 HC LODGING PLUS FACILITY CHARGE ADULT

## 2021-08-31 PROCEDURE — 99214 OFFICE O/P EST MOD 30 MIN: CPT | Performed by: FAMILY MEDICINE

## 2021-08-31 PROCEDURE — 85025 COMPLETE CBC W/AUTO DIFF WBC: CPT

## 2021-08-31 PROCEDURE — 87591 N.GONORRHOEAE DNA AMP PROB: CPT | Performed by: FAMILY MEDICINE

## 2021-08-31 PROCEDURE — 87389 HIV-1 AG W/HIV-1&-2 AB AG IA: CPT

## 2021-08-31 PROCEDURE — 86803 HEPATITIS C AB TEST: CPT

## 2021-08-31 PROCEDURE — H2035 A/D TX PROGRAM, PER HOUR: HCPCS | Mod: HQ

## 2021-08-31 PROCEDURE — 86780 TREPONEMA PALLIDUM: CPT

## 2021-08-31 PROCEDURE — 80053 COMPREHEN METABOLIC PANEL: CPT

## 2021-08-31 PROCEDURE — U0005 INFEC AGEN DETEC AMPLI PROBE: HCPCS | Performed by: FAMILY MEDICINE

## 2021-08-31 PROCEDURE — 36415 COLL VENOUS BLD VENIPUNCTURE: CPT

## 2021-08-31 RX ORDER — LORATADINE 10 MG/1
10 TABLET ORAL DAILY PRN
COMMUNITY
End: 2021-09-14

## 2021-08-31 RX ORDER — IBUPROFEN 200 MG
400 TABLET ORAL EVERY 6 HOURS PRN
COMMUNITY
End: 2021-09-14

## 2021-08-31 RX ORDER — BUPRENORPHINE AND NALOXONE 8; 2 MG/1; MG/1
FILM, SOLUBLE BUCCAL; SUBLINGUAL
Qty: 14 FILM | Refills: 0 | COMMUNITY
Start: 2021-08-31 | End: 2021-09-07

## 2021-08-31 RX ORDER — AMOXICILLIN 250 MG
2 CAPSULE ORAL DAILY PRN
COMMUNITY
End: 2021-09-14

## 2021-08-31 RX ORDER — MAGNESIUM HYDROXIDE/ALUMINUM HYDROXICE/SIMETHICONE 120; 1200; 1200 MG/30ML; MG/30ML; MG/30ML
30 SUSPENSION ORAL EVERY 6 HOURS PRN
COMMUNITY
End: 2021-09-14

## 2021-08-31 RX ORDER — BUPRENORPHINE AND NALOXONE 8; 2 MG/1; MG/1
FILM, SOLUBLE BUCCAL; SUBLINGUAL
Qty: 14 FILM | Refills: 0 | Status: SHIPPED | OUTPATIENT
Start: 2021-08-31 | End: 2021-09-07

## 2021-08-31 RX ORDER — ONDANSETRON 4 MG/1
4 TABLET, ORALLY DISINTEGRATING ORAL EVERY 8 HOURS PRN
Qty: 15 TABLET | Refills: 0 | Status: SHIPPED | OUTPATIENT
Start: 2021-08-31 | End: 2021-09-14

## 2021-08-31 RX ORDER — LANOLIN ALCOHOL/MO/W.PET/CERES
3 CREAM (GRAM) TOPICAL
COMMUNITY
End: 2021-09-14

## 2021-08-31 RX ORDER — GABAPENTIN 300 MG/1
300 CAPSULE ORAL 4 TIMES DAILY PRN
COMMUNITY
Start: 2021-08-31 | End: 2021-10-11

## 2021-08-31 RX ORDER — ACETAMINOPHEN 325 MG/1
325-650 TABLET ORAL EVERY 4 HOURS PRN
COMMUNITY
End: 2021-09-14

## 2021-08-31 RX ORDER — BUPRENORPHINE AND NALOXONE 2; .5 MG/1; MG/1
FILM, SOLUBLE BUCCAL; SUBLINGUAL
Qty: 1 FILM | Refills: 0 | Status: SHIPPED | OUTPATIENT
Start: 2021-08-31 | End: 2021-09-07

## 2021-08-31 ASSESSMENT — MIFFLIN-ST. JEOR: SCORE: 1730.1

## 2021-08-31 NOTE — PROGRESS NOTES
Name: Bebeto Alvarado  Date: 8/31/2021  Medical Record: [id number]  Envelope Number: [id number]  List of Contents (List each item separately in new row):   One Cell Phone (Cracked Screen),  One blue tooth ear phone,  One  & Cord.   Admission:  I am responsible for any personal items that are not sent to the safe or pharmacy.  [facility] is not responsible for loss, theft or damage of any property in my possession.    Patient Signature:  ___________________________________________       Date/Time:__________________________    Staff Signature: __________________________________       Date/Time:__________________________    [facility] Staff person, if patient is unable/unwilling to sign:      __________________________________________________________       Date/Time: __________________________    Discharge:  [facility] has returned all of my personal belongings:    Patient Signature: ________________________________________     Date/Time: ____________________________________    Staff Signature: ______________________________________     Date/Time:_____________________________________

## 2021-08-31 NOTE — PROGRESS NOTES
Progress Note    This patient had a Comprehensive Substance Abuse assessment on 8/10/21 completed by YOVANA Mart.  This patient was seen for a face to face update of the Comprehensive Substance Abuse assessment on 8/31/2021 by YOVANA Aaron.  INSIDE: The patient's Comprehensive Substance Abuse assessment completed on 8/10/21 is in the patient's electronic medical record in Epic in the Chart Review section under the Notes/Trans Tab.    Alcohol/Drug use since the last CD evaluation (include date of last use):     THC, Benzos, Heroin (believed to be fentanyl cut with various substances)     Please note any other clinical changes since the last CD evaluation (such as medication changes, additional legal charges, detoxification admissions, overdoses, etc.)     No significant changes since the last CD evaluation       ASAM Dimensions Original scores Current Scores   I.) Intoxication and Withdrawal: 1 1   II.) Biomedical:  1 0   III.) Emotional and Behavioral:  2 2   IV.) Readiness to Change:  2 2   V.) Relapse Potential: 4 4   VI.) Recovery Environmental: 3 4     Please list clinical justifications for the above ASAM score changes since the original comprehensive assessment:     The patient's score on Dimension II changed from a 1 to a 0.  Pt denies any medical issues.  The patient's score on Dimension VI changed from a 3 to a 4.  Pt is homeless.       Current PATRICIA: Current UA:     0.000     Positive for Amphetamines, Barbiturates, Benzodiazepines, Cocaine, Methamphetamine, MDMA, Oxycodone, PCP and THC and negative for all other screened drugs.       PHQ-9, WANDER-7   PHQ-9 on 8/31/2021 WANDER-7 on 8/31/2021   The patient's PHQ-9 score was 13 out of 27, indicating moderate depression.   The patient's WANDER-7 score was 12 out of 21, indicating moderate anxiety.       [place]-Suicide Severity Rating Scale Reassessment   Have you ever wished you were dead or that you could go to sleep and not wake up?  Past Month:   No     Have you actually had any thoughts of killing yourself?  Past Month:  No     Have you been thinking about how you might do this?     Past Month:  No   Lifetime:  No   Have you had these thoughts and had some intention of acting on them?     Past Month:  No   Lifetime:  No   Have you started to work out the details of how to kill yourself?   Past Month:  No   Lifetime:  No   Do you intend to carry out this plan?   No     When you have the thoughts how long do they last?  The patient denied having any suicidal thoughts within the past month.     Are there things - anyone or anything (i.e. family, [place], pain of death) that stopped you from wanting to die or acting on thoughts of suicide?  Does not apply       2008  [employer]  Used with permission by Ade Sandoval, PhD.       Guide to C-SSRS Risk Ratings   NO IDEATION:  with no active thoughts IDEATION: with a wish to die. IDEATION: with active thoughts. Risk Ratings   If Yes No No 0 - Very Low Risk   If NA Yes No 1 - Low Risk   If NA Yes Yes 2 - Low/moderate risk   IDEATION: associated thoughts of methods without intent or plan INTENT: Intent to follow through on suicide PLAN: Plan to follow through on suicide Risk Ratings cont...   If Yes No No 3 - Moderate Risk   If Yes Yes No 4 - High Risk   If Yes Yes Yes 5 - High Risk   The patient's ADDITIONAL RISK FACTORS and lack of PROTECTIVE FACTORS may increase their overall suicide risk ratings.     Additional Risk Factors:    Significant history of having untreated or poorly treated mental health symptoms     Tendency to be socially isolated and/or cut off from the support of others     A triggering event(s) leading to humiliation, shame or despair     Significant legal problems including being at risk of incarceration   Protective Factors:    Having people in his/her life that would prevent the patient from considering a suicide attempt (i.e. young children, spouse,  "parents, etc.)     An absence of chronic health problems or stable and well treated chronic health issues     A positive relationship with his/her clinical medical and/or mental health providers     Having easy access to supportive family members     Having cultural, Worship or spiritual beliefs that discourage suicide     Having restricted access to highly lethal means of suicide     Risk Status   1. - Low Risk: Evaluation Counselors:  Document in Epic / SBAR to counselor \"Low Risk\".      Treatment Counselors:  Reassess upon admission as applicable, assess weekly in progress notes under Dimension 3 and summarize in Discharge / Treatment summary under Dimension 3.     Additional information to support suicide risk rating: There was no additional information to provide at this time.       "

## 2021-08-31 NOTE — PROGRESS NOTES
"[facility] Nursing Health Assessment      Vital signs:     /69   Pulse 90   Ht 1.702 m (5' 7\")   Wt 81.6 kg (180 lb)   SpO2 96%   BMI 28.19 kg/m        Direct admission    Counselor: Estelita  Drug of Choice: heroin  Last use: yesterday early am  Home clinic/MD: no PCP  Psychiatrist/therapist: none  Dr Fisher addiction med    Medical history/current conditions: none    H&P Screen:  H&P within the last 90 days: Yes.  Date: 8/29/21 Location: ED visit      Mental Health diagnosis: ADHD, depression  Medication compliant?: yes  Recent sucidal thoughts? no     When? na  Current thought of self-harm? no    Plan? na    Pain assessment:   Pt. Experiencing pain at this time?  No    [facility] Medical Screen for COVID-19     Are you interested in receiving the COVID vaccine or flu vaccine while you are at [facility]?  No, already received       Do you have any of the following NEW or worsening symptoms NOT attributed to pre-existing conditions?    No,     Fever of 100.0  F (37.8 C) or over  Chills  Cough  Shortness of Breath  Loss of taste or smell  Generalized body aches  Persistent headache  Sore throat (or trouble eating or drinking in young children?)  Nausea, Vomiting, or diarrhea (loose stools)    Did you test positive for COVID-19 in the last 14  days or are you waiting on the test results due to an exposure or symptoms?  No,     Has anyone told you to self-quarantine due to exposure to someone with COVID-19?  No,     Does the above COVID-19 screen need to be reviewed by Infection Prevention? No,     COVID-19 Test completed by [facility] ? Yes     COVID-19 - Pt informed of the following while at :    1)Staff will take temperature and O2Sats once daily    2) Practice good hand washing hygiene and avoid touching face    3) If pt has any of the symptoms below, notify staff immediately.    Fever   Cough   Shortness of breath or difficulty breathing   Chills   Repeated shaking with chills  Muscle pain     4) " COVID-19 testing may be initiated more than once during your stay.  Patient will be required to stay in room until lab results confirm negative. If COVID results are positive, You will have to exit the program, quarantine as recommended per CDC and then may return for CD treatment after symptoms have resolved    5) Per COVID protocol, during your stay at , social distancing is required AND mouth and nose must be covered at all times with facial mask while out in milieu.      6) Patients will not be allowed to go to any outside appointments, all outside appointments will need to be virtual or by phone    RN Assessment of Patient's Ability to Safely Manage and Self-Administer Respiratory Treatments    Has experience in the management of Respiratory (If NA, indicate and move to Integrative Therapies): NA    Integrative Therapies: Essential Oils    Patient requesting essential oil inhaler to manage (Mood/Mental Health/Physical/Spiritual symptoms).     Discussed appropriate use of essential oil inhalers and instructed patient not to leave labeled product out on unit.     Patient was screened for kidney disease, asthma/reactive airway disease and rashes and wounds or 1st trimester of pregnancy    List Essential Oils requested by pt lavender, sweet orange, therabreathe    Patient verbalized and demonstrated understanding of how to use essential oil inhaler correctly and will notify [facility] RN with any concerns or side effects. Patient agrees not to share their essential oil inhaler with other clients.  Continue to support the patient in safely utilizing integrative therapies as able to manage symptoms during treatment.       Patient tobacco use: 10-20 cig per day      Are you interested in quitting? Not right now    NRT (Nicotine Replacement therapy) ordered? declined   Pt is aware of the dangers of tobacco cessation and in contemplation.    Pt given written education.          Nutritional Assessment:    Have you ever purged,  binged or restricted yourself as a way to control your weight?   No     Are you on a special diet?   No     Do you have any concerns regarding your nutritional status?   No     Have you had any appetite changes in the last 3 months?   No   Have you had weight loss or weight gain of more than 10 lbs in the last 3 months?   If patient gained or lost more than 10 lbs, then refer to program RN / attending Physician for assessment.   No   Was the patient informed of BMI?    Above,  General nutrition education   Yes   Have you engaged in any risk-taking behavior that would put you at risk for exposure to blood-borne or sexually transmitted diseases?   Yes, explain: IV drug use, sharing needles, testing requested   Do you have any dental problems?   No           Nursing Assessment Summary:  As above    On-going nursing intervention required?   No    Acute care visit recommended: no

## 2021-08-31 NOTE — TELEPHONE ENCOUNTER
[facility], he will be coming to see Dr Fisher during walk in hours this afternoon. Pt did not bring suboxone with him. Pt brought with him a 10 day supply of Remeron 7.5mg, gabapentin 300mg PRN 27 caps, vistaril  25mg 14 caps.     Pt is requesting that orders be placed for HIV and hep C testing.

## 2021-08-31 NOTE — PROGRESS NOTES
Initial Services Plan         Service Initiation Date: 8/31/21     Immediate health and/or safety concerns: No     Identify health and safety concern(s) below and include plan to address:     None Identified     Treatment suggestions for client during the time between intake (admit date) and completion of the individual treatment plan:      Look for a sober support network, i.e. 12 step, [facility], [facility], etc  Tour the treatment center or outpatient clinic  Introduce yourself to your treatment group. Spend time getting to know your peers  Review your patient or client handbook  Begin working on your treatment goal list     Completed by: YOVANA Aaron  Date completed: 8/31/21

## 2021-08-31 NOTE — PROGRESS NOTES
[facility] Return Visit    ASSESSMENT/PLAN                                                    1. Opioid use disorder, moderate, dependence (H)  Pt with h/o opioid use starting 2009, IV heroin/fentanyl use since 2019.  Last use 8/30/21.    Reviewed timing of re-initiation of buprenorphine in the setting of fentanyl use.  Pt reporting h/o precipitated withdrawal when he has attempted this 36hrs from his last use.  Planning to wait 48 hrs from last use as noted below.    Titrate buprenorphine up to 16mg/day  Pt remains interested in transfer to XR buprenorphine, will arrange for this when pt has been taking at least 8mg sublingual buprenorphine daily for one week.   Sublocade approval obtained previously.   Planning eventual transfer to Addiction Medicine for long term care  Continue [facility] and follow counselors' recommendations  Baseline labs and ID screening as noted; recommend retesting in 2-3 months.     - CBC with Platelets & Differential; Future  - COMPREHENSIVE METABOLIC PANEL; Future  - Hepatitis B core antibody; Future  - Hepatitis B Surface Antibody; Future  - HIV Antigen Antibody Combo; Future  - Hepatitis C Screen Reflex to HCV RNA Quant and Genotype; Future  - Hepatitis B surface antigen; Future  - buprenorphine HCl-naloxone HCl (SUBOXONE) 2-0.5 MG per film; Take 1 sl when at least 48 hrs from last use and experiencing at least moderate withdrawal symptoms; if no increase in  withdrawal symptoms after 30min, start 8mg/2mg films as directed.  Dispense: 1 Film; Refill: 0  - buprenorphine HCl-naloxone HCl (SUBOXONE) 8-2 MG per film; If no precipitated withdrawal 30min after taking 2mg/0.5mg Suboxone film, take one film SL.  May then take 1/2 film SL q2-4 hours prn withdrawal symptoms.  Day 2, start 1 film SL bid  Dispense: 14 Film; Refill: 0    2. Tobacco use disorder  Currently smoking about 1/2 ppd, not ready to quit at this time    3. Screen for STD (sexually transmitted  disease)  - NEISSERIA GONORRHOEA PCR  - CHLAMYDIA TRACHOMATIS PCR  - Hepatitis B core antibody; Future  - Hepatitis B Surface Antibody; Future  - Treponema Abs w Reflex to RPR and Titer; Future  - HIV Antigen Antibody Combo; Future  - Hepatitis C Screen Reflex to HCV RNA Quant and Genotype; Future  - Hepatitis B surface antigen; Future    4. Opioid withdrawal (H)  - ondansetron (ZOFRAN-ODT) 4 MG ODT tab; Take 1 tablet (4 mg) by mouth every 8 hours as needed for nausea or vomiting  Dispense: 15 tablet; Refill: 0        Return in about 1 week (around 9/7/2021) for Follow up, with me, in person.    9/28/21 8:30 with Dr. Alannah Whitaker for long term buprenorphine management.   SUBJECTIVE                                                      CC/HPI:  Bebeto Alvarado is a 30 year old male with PMH PSUD primarily opioids on buprenorphine who presents to the [facility] for return visit.      History of use/addiction :  Opioids: Pt started using opioids 2009 using opioid pills, then progressed to smoking heroin 2011, daily heroin use began 2015,  then IV use starting 2019.     IV drug use: Yes: most recently 8/30/21 - endorses sharing cotton with another person during use  History of overdose: Yes: 2019  Previous treatments : Yes: multiple; most recently admitted to [facility] 8/31/21; h/o buprenorphine treatment including XR buprenorphine in 2020  Longest period of sobriety:6-9 months on buprenorphine  Medical complications related to substance use: none known  Hepatitis C Status  11/14/19 negative  HIV Status 11/14/19 negative      I last met with pt on 8/12/21  A/P at that time was:  1. Opioid use disorder, moderate, dependence (H)  Pt reports a 12 year history of using opioids, currently using heroin/fentanyl by iv route(s.)  Last reported use 8/10/21.  Pt is interested in starting buprenorphine for treatment.  We reviewed directions for home initiation of buprenorphine, including in the setting of fentanyl  "use.  We reviewed in detail use of other medications for treatment of opioid withdrawal symptoms as needed until stabilized on buprenorphine.  Baseline labs discussed, will obtain updated hcv/hiv screening next visit.    Continue buprenorphine SL 12mg/day after initiation.   Pt scheduled initial Sublocade 300mg for 8/23/21.  Discontinue sublingual buprenorphine 24hrs after initial Sublocade.   Continue treatment with [facility] and follow counselors' recommendations.     - Buprenorphine HCl-Naloxone HCl (SUBOXONE) 12-3 MG FILM per film; 1/2 film sl bid  Dispense: 15 Film; Refill: 0    2. Opioid withdrawal (H)  - hydrOXYzine (VISTARIL) 25 MG capsule; Take 1-2 capsules (25-50 mg) by mouth 4 times daily as needed for anxiety (withdrawal symptoms)  Dispense: 40 capsule; Refill: 0  - cloNIDine (CATAPRES) 0.1 MG tablet; Take 1 tablet (0.1 mg) by mouth 4 times daily as needed (withdrawal symptoms)  Dispense: 20 tablet; Refill: 0    3. Tobacco use disorder  Pt states he has patches and is interested in quitting    4. Insomnia, unspecified type  Continue mirtazapine 7.5-15mg qhs        Since last appt, pt returned to use of opioids and left his previous treatment with [facility].   He has since started [facility] 8/31/21.   He states he took buprenorphine for a couple of days after our 8/12/21 appointment.    He has been using heroin/fentanyl IV daily, last use 8/30/21 around 5am.  He reports some of the heroin he used in the last 2 weeks was adulterated with methamphetamine.  He took a very small portion of a Xanax tablet during that use to counteract the methamphetamine.      He currently c/o very mild withdrawal symptoms including \"goosebumps,\" occasional sneezing, mild body aches, and restlessness.  He did bring clonidine, gabapentin, and hydroxyzine with him to [facility] and states these have been helpful for withdrawal symptoms in the past.   He also brought rx mirtazapine with him, and states this was very " effective for sleep when he took this on occasion after our last visit.   He remains interested in resuming XR buprenorphine, and would like to talk about this transition further at next visit.    He also states he shared a cotton with another person while using, does not know the other person's HCV or HIV status.          [place] Prescription Drug Monitoring Program Reviewed:  Yes; as expected        Past Medical History:   Diagnosis Date     Opiate abuse, continuous (H)      Tobacco use      PAST PSYCHIATRIC HISTORY:  Diagnoses- depression  Suicide Attempts: No   Hospitalizations: No     No past surgical history on file.    Medications:  acetaminophen (TYLENOL) 325 MG tablet, Take 325-650 mg by mouth every 4 hours as needed for mild pain  alum & mag hydroxide-simethicone (MAALOX) 200-200-20 MG/5ML SUSP suspension, Take 30 mLs by mouth every 6 hours as needed for indigestion  Buprenorphine HCl-Naloxone HCl (SUBOXONE) 12-3 MG FILM per film, 1/2 film sl bid  cloNIDine (CATAPRES) 0.1 MG tablet, Take 1 tablet (0.1 mg) by mouth 4 times daily as needed (withdrawal symptoms)  gabapentin (NEURONTIN) 300 MG capsule,   guaiFENesin (ROBITUSSIN) 20 mg/mL SOLN solution, Take 10 mLs by mouth every 4 hours as needed for cough  hydrOXYzine (VISTARIL) 25 MG capsule, Take 1-2 capsules (25-50 mg) by mouth 4 times daily as needed for anxiety (withdrawal symptoms)  ibuprofen (ADVIL/MOTRIN) 200 MG tablet, Take 400 mg by mouth every 6 hours as needed for mild pain  loratadine (CLARITIN) 10 MG tablet, Take 10 mg by mouth daily as needed for allergies  melatonin 3 MG tablet, Take 3 mg by mouth nightly as needed for sleep  mirtazapine (REMERON) 15 MG tablet, 1/2-1 tablet po qhs  naloxone (NARCAN) 4 MG/0.1ML nasal spray, Spray 1 spray (4 mg) into one nostril alternating nostrils as needed for opioid reversal every 2-3 minutes until assistance arrives  phenol-menthol (CEPASTAT) 14.5 MG lozenge, Place 1 lozenge inside cheek every 2 hours as  needed for moderate pain  senna-docusate (SENOKOT-S/PERICOLACE) 8.6-50 MG tablet, Take 2 tablets by mouth daily as needed for constipation    No current facility-administered medications on file prior to visit.      Family History   Problem Relation Age of Onset     Diabetes Mother      Hypertension Mother      No Known Problems Father      No Known Problems Brother      Suicide Other        Allergies   Allergen Reactions     Codeine Unknown     Pt has been told.         Social History  Housing status: in [facility]  Employment status: Unemployed, not seeking work  Relationship status: Single broke up with his SO 8/29/21  Children: no children; ex-SO has a child           REVIEW OF SYSTEMS:  Withdrawal symptoms as above    OBJECTIVE                                                      /70   Pulse 81     Physical Exam  HENT:      Head: Normocephalic and atraumatic.   Eyes:      General: No scleral icterus.     Conjunctiva/sclera: Conjunctivae normal.   Pulmonary:      Effort: Pulmonary effort is normal.   Neurological:      Mental Status: He is alert and oriented to person, place, and time.      Coordination: Coordination is intact.      Gait: Gait is intact.   Psychiatric:         Attention and Perception: Attention and perception normal.         Mood and Affect: Affect normal. Mood is anxious.         Speech: Speech normal.         Behavior: Behavior is cooperative.         Thought Content: Thought content normal. Thought content does not include suicidal ideation.         Cognition and Memory: Cognition normal.      Comments: Insight and judgement are fair         Labs:    UDS: morphine  *POC urine drug screen does not screen for Fentanyl    Recent Results (from the past 240 hour(s))   COVID-19 VIRUS (CORONAVIRUS) BY PCR (EXTERNAL RESULT)    Collection Time: 08/29/21  5:35 AM   Result Value Ref Range    COVID-19 Virus by PCR (External Result) Negative Negative   Asymptomatic COVID-19 Virus (Coronavirus)  by PCR Nasopharyngeal    Collection Time: 08/31/21 11:49 AM    Specimen: Nasopharyngeal; Swab   Result Value Ref Range    SARS CoV2 PCR Negative Negative   NEISSERIA GONORRHOEA PCR    Collection Time: 08/31/21  2:26 PM    Specimen: Urine, Voided   Result Value Ref Range    Neisseria gonorrhoeae Negative Negative   CHLAMYDIA TRACHOMATIS PCR    Collection Time: 08/31/21  2:26 PM    Specimen: Urine, Voided   Result Value Ref Range    Chlamydia trachomatis Negative Negative   COMPREHENSIVE METABOLIC PANEL    Collection Time: 08/31/21  2:42 PM   Result Value Ref Range    Sodium 140 133 - 144 mmol/L    Potassium 4.9 3.4 - 5.3 mmol/L    Chloride 105 94 - 109 mmol/L    Carbon Dioxide (CO2) 32 20 - 32 mmol/L    Anion Gap 3 3 - 14 mmol/L    Urea Nitrogen 19 7 - 30 mg/dL    Creatinine 0.98 0.66 - 1.25 mg/dL    Calcium 9.0 8.5 - 10.1 mg/dL    Glucose 95 70 - 99 mg/dL    Alkaline Phosphatase 87 40 - 150 U/L    AST 26 0 - 45 U/L    ALT 33 0 - 70 U/L    Protein Total 7.2 6.8 - 8.8 g/dL    Albumin 3.5 3.4 - 5.0 g/dL    Bilirubin Total 0.2 0.2 - 1.3 mg/dL    GFR Estimate >90 >60 mL/min/1.73m2   Hepatitis B core antibody    Collection Time: 08/31/21  2:42 PM   Result Value Ref Range    Hepatitis B Core Antibody Total Nonreactive Nonreactive   Hepatitis B Surface Antibody    Collection Time: 08/31/21  2:42 PM   Result Value Ref Range    Hepatitis B Surface Antibody 2.90 <8.00 m[IU]/mL   Treponema Abs w Reflex to RPR and Titer    Collection Time: 08/31/21  2:42 PM   Result Value Ref Range    Treponema Antibody Total Nonreactive Nonreactive   HIV Antigen Antibody Combo    Collection Time: 08/31/21  2:42 PM   Result Value Ref Range    HIV Antigen Antibody Combo Nonreactive Nonreactive   Hepatitis C Screen Reflex to HCV RNA Quant and Genotype    Collection Time: 08/31/21  2:42 PM   Result Value Ref Range    Hepatitis C Antibody Nonreactive Nonreactive   Hepatitis B surface antigen    Collection Time: 08/31/21  2:42 PM   Result Value Ref  Range    Hepatitis B Surface Antigen Nonreactive Nonreactive   CBC with platelets and differential    Collection Time: 08/31/21  2:42 PM   Result Value Ref Range    WBC Count 11.4 (H) 4.0 - 11.0 10e3/uL    RBC Count 4.53 4.40 - 5.90 10e6/uL    Hemoglobin 12.3 (L) 13.3 - 17.7 g/dL    Hematocrit 38.2 (L) 40.0 - 53.0 %    MCV 84 78 - 100 fL    MCH 27.2 26.5 - 33.0 pg    MCHC 32.2 31.5 - 36.5 g/dL    RDW 13.2 10.0 - 15.0 %    Platelet Count 360 150 - 450 10e3/uL    % Neutrophils 61 %    % Lymphocytes 28 %    % Monocytes 8 %    % Eosinophils 2 %    % Basophils 1 %    % Immature Granulocytes 0 %    NRBCs per 100 WBC 0 <1 /100    Absolute Neutrophils 6.9 1.6 - 8.3 10e3/uL    Absolute Lymphocytes 3.2 0.8 - 5.3 10e3/uL    Absolute Monocytes 0.9 0.0 - 1.3 10e3/uL    Absolute Eosinophils 0.3 0.0 - 0.7 10e3/uL    Absolute Basophils 0.1 0.0 - 0.2 10e3/uL    Absolute Immature Granulocytes 0.1 (H) <=0.0 10e3/uL    Absolute NRBCs 0.0 10e3/uL           Patient counseling completed today:  Discussed mechanism of action, potential risks/benefits/side effects of medications and other recommendations above.   Discussed risk of precipitated withdrawal with initiation of buprenorphine in the presence of full opioid agonists. Recommend pt keep naloxone in their possession and reviewed other aspects of harm reduction to reduce risk of overdose with return to use.   Recommended avoiding concurrent use of alcohol, benzodiazepines or other sedatives with buprenorphine or other opioids.  Discussed importance of avoiding isolation, building a network of supportive relationships, avoiding people/places/things associated with past use to reduce risk of relapse; including motivational interviewing regarding psychosocial treatment for addiction.     At least 30 min spent in review of medical record,  review, obtaining histories, reviewing symptoms, discussing pt's goals for treatment, counseling noted above.    CLAUDIA CHRISTOPHER MD    [facility]  [facility]  [address] [place] [id number]  [phone]

## 2021-08-31 NOTE — PROGRESS NOTES
Patient:  Bebeto Alvarado    Date: August 31, 2021    Comprehensive Assessment UPDATE       Comprehensive Summary Update and Review  Counselor met with patient on 8-31-21 and reviewed the Comprehensive Assessment.    There were no changes/updates identified by patient or in chart entries.
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
## 2021-08-31 NOTE — PROGRESS NOTES
[facility]  [address] [place] [id number]        ADULT CD ASSESSMENT ADDENDUM      Patient Name: Bebeto Alvarado  Cell Phone:   Home: [phone] (home)    Mobile:   Telephone Information:   Mobile [phone]       Email:  [email or web address]  Emergency Contact: Melissa Alvarado   Tel: [phone]    The patient reported being:  Single, no serious involvement    With which race do you identify? White    Initial Screening Questions     1. Are you currently having severe withdrawal symptoms that are putting yourself or others in danger?  No    2. Are you currently having severe medical problems that require immediate attention?  No    3. Are you currently having severe emotional or behavioral problems that are putting yourself or others at risk of harm?  No    4. Do you currently participate in community artemio activities, such as attending [employer], temple, Protestant or Confucianism services?  Yes, explain: sometimes go to [employer]    5. How does your spirituality impact your recovery?  Still discovering    6. Do you currently self-administer your medications?  Yes    Have you ever purged, binged or restricted yourself as a way to control your weight?   No     Are you on a special diet?   No     Do you have any concerns regarding your nutritional status?   No     Have you had any appetite changes in the last 3 months?   No   Have you had weight loss or weight gain of more than 10 lbs in the last 3 months?   If patient gained or lost more than 10 lbs, then refer to program RN / attending Physician for assessment.   No   Was the patient informed of BMI?    Normal, No Intervention   Yes   Have you engaged in any risk-taking behavior that would put you at risk for exposure to blood-borne or sexually transmitted diseases?   Yes, explain: unprotected sex   Do you have any dental problems?   No   Do you have a valid 's license?    Yes       Mental Health Status   Physical Appearance/Attire:  Appears stated age   Hygiene: well groomed   Eye Contact: at examiner   Speech Rate:  regular   Speech Volume: regular   Speech Quality: fluid   Cognitive/Perceptual:  reality based   Cognition: memory intact    Judgment: intact   Insight: intact   Orientation:  time, place, person and situation   Thought: logical    Hallucinations:  none   General Behavioral Tone: cooperative   Psychomotor Activity: no problem noted   Gait:  no problem   Mood: normal   Affect: congruence/appropriate   Counselor Notes: NA     Criteria for Diagnosis: DSM-5 Criteria for Substance Use Disorders      Opioid Use Disorder Severe - 304.00 (F11.20)    Level of Care   I.) Intoxication and Withdrawal: 1   II.) Biomedical:  1   III.) Emotional and Behavioral:  2   IV.) Readiness to Change:  2   V.) Relapse Potential: 4   VI.) Recovery Environmental: 3     Initial Problem List     The patient is currently homeless  The patient has unstable housing  The patient is currently living in an unhealthy and/or using environment  The patient lacks relapse prevention skills  The patient has poor coping skills  The patient has poor refusal skills   The patient lacks a sober peer support network  The patient lacks the ability to effectively manage his/her mental health issues  The patient has a significant history of guilt and shame issues  The patient has current legal issues    Patient/Client is willing to follow treatment recommendations.  Yes    Counselor: YOVANA Aaron

## 2021-08-31 NOTE — PROGRESS NOTES
Comprehensive Assessment Summary     Based on client interview, review of previous assessments and   comprehensive assessment interview the following diagnosis and recommendations are:     Patient: Bebeto Alvarado  MRN; [id number]   : 1990  Age: 30 year old Sex: male       Client meets criteria for:  304.00 Opioids Dependence    Dimension One: Acute Intoxication/Withdrawal Potential     Ratin     (Consider the client's ability to cope with withdrawal symptoms and current state of intoxication)   Pt.current UA was positive for Amphetamines,Bariturates,Benzo's,Cocaine MDMA,Oxycodone,PCP and THC.    Dimension Two: Biomedical Condition and Complications    Ratin   (Consider the degree to which any physical disorder would interfere with treatment for substance abuse, and the client's ability to tolerate any related discomfort; determine the impact of continued chemical use on the unborn child if the client is pregnant)   No issues.    Dimension Three: Emotional/Behavioral/Cognitive Conditions & Complications  Ratin   (Determine the degree to which any condition or complications are likely to interfere with treatment for substance abuse or with functioning in significant life areas and the likelihood of risk of harm to self or others)   Pt.reports a history of depression and anxiety. He lacks awareness of the compounding affects his addiction has on his mental health. Pt.lacks sober coping skills and impulse control. Pt.has been referred to a therapist many times but can not stay sober long enough to even get to a appointment.     Dimension Four: Treatment Acceptance/Resistance     Ratin   (Consider the amount of support and encouragement necessary to keep the client involved in treatment)   Pt.lacks insight and acceptance of the first step. He lacks internal motivation to change his life style to maintain sobriety. This is his third treatment in the last two months. He has continues to use  despite legal issues.Pt.loves to use more then anything else in life. Pt.continues to state he wants to quit drugs but his behaviors show he is not willing to do the work to get and stay sober. Pt.was admitted to this treatment high on drugs.      Dimension Five: Continued Use/Relaspe Prevention     Ratin    (Consider the degree to which the client's recognizes relapse issues and has the skills to prevent relapse of either substance use or mental health problems)   Pt.lacks insight into his relapse warning signs and triggers in the areas of people,places,activities and feelings. He has had 10 plus treatments and only completed two. He states his longest sobriety has been maybe 9 months. Pt.lacks insight into his relapse process. Pt.love drugs more then anything else in life. He lacks long term sober maintenance skills and impulse control. Pt.has continues to use during his last two treatments. Pt.continues to be a high risk to relapse.     Dimension Six: Recovery Environment     Ratin   (Consider the degree to which key areas of the client's life are supportive of or antagonistic to treatment participation and recovery)   Pt.lacks a sober support system. He does not have a sponsor. He is homeless and unemployed. He continues to use despite being on probation for theft and a pending DUI.     I have reviewed the information on the assessment, psychosocial and medical history and checklist:        it is current

## 2021-08-31 NOTE — TELEPHONE ENCOUNTER
Received a call from Kaden, pharmacy tech. Per Kaden, pt's insurance will not cover Suboxone 8-2 mg Rx. States insurance will only cover up to 2 films daily.  Med instruction for day 1 exceeds 2 films. Kaden wondering if PA should be initiated or if provider can amend Rx.    Routing to  for review and directives.

## 2021-08-31 NOTE — NURSING NOTE
[facility]      Rooming information:  Approximate last use of illicit opioids: 8/30/2021  Taking buprenorphine? No, ran out last week  Number of buprenorphine films/tablets remaining currently: 0  Narcan currently available: Yes  Other recent substance use:    NICOTINE-Yes:    Denies  If using nicotine, ready to quit? No    Point of care urine drug screen positive for: morphine  *POC urine drug screen does not screen for Fentanyl      Insurance needs: active    Housing needs: stable at     Contact information up to date? yes    3rd Party Involvement none today (please obtain ANAIS if pt would like to include)    Primary care provider: NEEL Tariq CNP     Mental health provider: marylin (follow up on MH referral if needed)    Janet Brice CMA  August 31, 2021  1:57 PM

## 2021-08-31 NOTE — TELEPHONE ENCOUNTER
Received the following Verbal order from :  buprenorphine HCl-naloxone HCl (SUBOXONE) 8-2 MG per film 14 Film 0 8/31/2021  --   Sig: If no precipitated withdrawal 30 min after taking 2 mg/0.5 mg Suboxone film, take one film SL. May then take 1/2 film SL q2-4 hours prn withdrawal symptoms: Maximum of 2 films on Day 1. Day 2, start 1 film SL bid       Called [facility] and spoke to Kathy, pharmacist. Gave Kathy verbal order, per 's directive. Verbal order read back for accuracy. Kathy stated Rx can now be dispensed.     Updated Rx on pt's med list as a historical med.    Routing to Dr. Fisher as an FYI.

## 2021-08-31 NOTE — PROGRESS NOTES
Pt use history was incomplete in most recent assessment.    Pt states that he used heroin recently but shows positive for multiple substances on his UA that he states he did not use. It is believed Pt was told that what he used was heroin but that it was actually fentanyl cut with several substances. Therefore, current UA results may not accurately reflect Pt's use pattern.    DIMENSION I - Acute Intoxication /Withdrawal Potential   1. Chemical use most recent 12 months outside a facility and other significant use history (client self-report)              X = Primary Drug Used   Age of First Use Most Recent Pattern of Use and Duration   Need enough information to show pattern (both frequency and amounts) and to show tolerance for each chemical that has a diagnosis   Date of last use and time, if needed   Withdrawal Potential? Requiring special care Method of use  (oral, smoked, snort, IV, etc)      Alcohol     14 No recent use   Doesn't know No Oral      Marijuana/  Hashish   13 One hit recently, rarely smokes 4 days ago No Smoke      Cocaine/Crack     No use Was positive on UA, Pt denies use, thinks fentanyl was cut with this and sold to him as heroin unknown No IV      Meth/  Amphetamines   29 Recently used $10 worth, does not usually use it 4 days ago No IV     X Heroin     21 Every day for the past couple months Yesterday morning (may have been fentanyl) No, on Suboxone now IV      Other Opiates/  Synthetics   18 Was positive on UA, Pt denies use, thinks fentanyl was cut with this and sold to him as heroin unknown No, on Suboxone now IV      Inhalants     No use          Benzodiazepines     24 Took 1/4 xanax 4 or 5 days ago 4-5 days ago No oral      Hallucinogens     No use          Barbiturates/  Sedatives/  Hypnotics 30 Was positive on UA, Pt denies use, thinks fentanyl was cut with this and sold to him as heroin unknown No IV      Over-the-Counter Drugs   No use          Other     No use          Nicotine      14 1/2 to 1 ppd today No smoke

## 2021-08-31 NOTE — PROGRESS NOTES
Name: Bebeto Alvarado  Date: 8/31/2021  Medical Record: [id number]  Envelope Number: [id number]  List of Contents (List each item separately in new row):   Mirtazapine 15mg Tabs, Hydroxyzine 25mg Caps,  Extra strength head ache relief acetaminophen caplets,  Gabapentin 300mg caps,  Narcan nasal spray 4mg.   Admission:  I am responsible for any personal items that are not sent to the safe or pharmacy.  [facility] is not responsible for loss, theft or damage of any property in my possession.    Patient Signature:  ___________________________________________       Date/Time:__________________________    Staff Signature: __________________________________       Date/Time:__________________________    [facility] Staff person, if patient is unable/unwilling to sign:      __________________________________________________________       Date/Time: __________________________    Discharge:  [facility] has returned all of my personal belongings:    Patient Signature: ________________________________________     Date/Time: ____________________________________    Staff Signature: ______________________________________     Date/Time:_____________________________________

## 2021-08-31 NOTE — TELEPHONE ENCOUNTER
Staff also found additional mirtazapine and hydroxyzine in pt belongings so he shouldn't need more of those meds, also found clonidine bottle with 20 tabs and no refills .

## 2021-09-01 ENCOUNTER — HOSPITAL ENCOUNTER (OUTPATIENT)
Dept: BEHAVIORAL HEALTH | Facility: CLINIC | Age: 31
End: 2021-09-01
Payer: COMMERCIAL

## 2021-09-01 VITALS — OXYGEN SATURATION: 97 % | TEMPERATURE: 98.1 F

## 2021-09-01 LAB
C TRACH DNA SPEC QL NAA+PROBE: NEGATIVE
N GONORRHOEA DNA SPEC QL NAA+PROBE: NEGATIVE

## 2021-09-01 PROCEDURE — H2035 A/D TX PROGRAM, PER HOUR: HCPCS | Mod: HQ

## 2021-09-01 PROCEDURE — 1002N00001 HC LODGING PLUS FACILITY CHARGE ADULT

## 2021-09-01 NOTE — ADDENDUM NOTE
Encounter addended by: Misael Sawant LADC on: 9/1/2021 8:15 AM   Actions taken: Clinical Note Signed

## 2021-09-01 NOTE — GROUP NOTE
Group Therapy Documentation    PATIENT'S NAME: Bebeto Alvarado  MRN:   [id number]  :   1990  ACCT. NUMBER: [id number]  DATE OF SERVICE: 21  START TIME:  9:45 AM  END TIME: 11:15 AM  FACILITATOR(S): Zachary Eckert LADC; Misael Sawant LADC; Remberto Cobos  TOPIC: BEH Group Therapy  Number of patients attending the group:  10  Group Length:  1.5 Hours    Group Therapy Type: Recovery strategies    Summary of Group / Topics Discussed:    Recovery Principles      Group Attendance:  Attended group session    Patient's response to the group topic/interactions:  cooperative with task    Patient appeared to be Attentive.        Client specific details:  Tavo attended AM group. Patient checked in, and gave a brief bio. Patient took part in a group exercise/discussion.

## 2021-09-01 NOTE — GROUP NOTE
Psychoeducation Group Documentation    PATIENT'S NAME: Bebeto Alvarado  MRN:   [id number]  :   1990  ACCT. NUMBER: [id number]  DATE OF SERVICE: 21  START TIME:  8:30 AM  END TIME:  9:30 AM  FACILITATOR(S): Misael Sawant LADC; Mylene Urias LADC  TOPIC: BEH Pyschoeducation  Number of patients attending the group:  10  Group Length:  1 Hours    Skills Group Therapy Type: Healthy behaviors development    Summary of Group / Topics Discussed:    Balanced lifestyle skills          Group Attendance:  Attended group session    Patient's response to the group topic/interactions:  cooperative with task    Patient appeared to be Attentive.         Client specific details:  Tavo gave appropriate feedback..

## 2021-09-01 NOTE — GROUP NOTE
Group Therapy Documentation    PATIENT'S NAME: Bebeto Alvarado  MRN:   [id number]  :   1990  ACCT. NUMBER: [id number]  DATE OF SERVICE: 21  START TIME: 12:30 PM  END TIME:  2:30 PM  FACILITATOR(S): Zachary Eckert LADC; Aura Mccoy; Remberto Cobos  TOPIC: BEH Group Therapy  Number of patients attending the group:  10  Group Length:  2 Hours    Group Therapy Type: Recovery strategies    Summary of Group / Topics Discussed:    Recovery Principles and Relapse prevention      Group Attendance:  Attended group session    Patient's response to the group topic/interactions:  cooperative with task    Patient appeared to be Attentive.        Client specific details:  Tavo attended afternoon group. Patient took part in a group exercise on the disease of addiction, and relapse prevention. Patient appeared attentive and engaged.

## 2021-09-01 NOTE — PROGRESS NOTES
Acknowledgement of Current Treatment Plan - Initial Treatment Plan     INITIAL TREATMENT PLAN:     1. I have participated in creating my treatment plan with my therapist / counselor on __5-6-86________.     I agree with the plan as it is written in the electronic health record.    Name Signature/Date   Patient     Name of Therapist / Counselor Signature/Date   Counselor/Therapist        2. I have completed and reviewed my Safety Plan with my counselor and signed this on _4-4-13________. I have been given the hard copy of this plan.    Patient signature/date:      _____________________________________________________________________________    3. Last Use Date: _7-38-86_________    Patient signature/date:     _____________________________________________________________________________

## 2021-09-02 ENCOUNTER — HOSPITAL ENCOUNTER (OUTPATIENT)
Dept: BEHAVIORAL HEALTH | Facility: CLINIC | Age: 31
End: 2021-09-02
Attending: FAMILY MEDICINE
Payer: COMMERCIAL

## 2021-09-02 VITALS — TEMPERATURE: 98 F | OXYGEN SATURATION: 98 %

## 2021-09-02 PROCEDURE — 1002N00001 HC LODGING PLUS FACILITY CHARGE ADULT

## 2021-09-02 PROCEDURE — H2035 A/D TX PROGRAM, PER HOUR: HCPCS | Mod: HQ

## 2021-09-02 NOTE — PROGRESS NOTES
Patient left afternoon group after an hour and didn't return to group. LP nurse was consulted and reported that patient didn't have permission to miss group. Patient was notified that he can't be late or leave programming early.

## 2021-09-03 ENCOUNTER — HOSPITAL ENCOUNTER (OUTPATIENT)
Dept: BEHAVIORAL HEALTH | Facility: CLINIC | Age: 31
End: 2021-09-03
Attending: FAMILY MEDICINE
Payer: COMMERCIAL

## 2021-09-03 VITALS — TEMPERATURE: 97.9 F | OXYGEN SATURATION: 100 %

## 2021-09-03 PROCEDURE — H2035 A/D TX PROGRAM, PER HOUR: HCPCS | Mod: HQ

## 2021-09-03 PROCEDURE — 1002N00001 HC LODGING PLUS FACILITY CHARGE ADULT

## 2021-09-03 NOTE — GROUP NOTE
Group Therapy Documentation    PATIENT'S NAME: Bebeto Alvarado  MRN:   [id number]  :   1990  ACCT. NUMBER: [id number]  DATE OF SERVICE: 21  START TIME:  9:00 AM  END TIME: 11:00 AM  FACILITATOR(S): Zachary Eckert LADC  TOPIC: BEH Group Therapy  Number of patients attending the group:  9  Group Length:  2 Hours    Group Therapy Type: Recovery strategies    Summary of Group / Topics Discussed:    Recovery Principles      Group Attendance:  Attended group session    Patient's response to the group topic/interactions:  cooperative with task    Patient appeared to be Attentive.        Client specific details:  Tavo attended AM group. He took part in a group reading/discussion on self pity, healthy habits, and keeping motivated.

## 2021-09-03 NOTE — GROUP NOTE
Group Therapy Documentation    PATIENT'S NAME: Bebeto Alvarado  MRN:   [id number]  :   1990  ACCT. NUMBER: [id number]  DATE OF SERVICE: 21  START TIME: 12:30 PM  END TIME:  2:30 PM  FACILITATOR(S): Sofia Cummins LADC; Aura Mccoy  TOPIC: BEH Group Therapy  Number of patients attending the group: 17  Group Length:  2 Hours    Group Therapy Type: Recovery strategies    Summary of Group / Topics Discussed:    Disease of addiction, Leisure explorations/use of leisure time, and Self-care activities      Group Attendance:  Attended group session    Patient's response to the group topic/interactions:  cooperative with task    Patient appeared to be Attentive.        Client specific details: Tavo was an active participant in sober leisure activity and discussion on the disease of addiction.

## 2021-09-04 ENCOUNTER — HOSPITAL ENCOUNTER (OUTPATIENT)
Dept: BEHAVIORAL HEALTH | Facility: CLINIC | Age: 31
End: 2021-09-04
Attending: FAMILY MEDICINE
Payer: COMMERCIAL

## 2021-09-04 PROCEDURE — H2035 A/D TX PROGRAM, PER HOUR: HCPCS | Mod: HQ

## 2021-09-04 PROCEDURE — 1002N00001 HC LODGING PLUS FACILITY CHARGE ADULT

## 2021-09-04 NOTE — GROUP NOTE
Group Therapy Documentation    PATIENT'S NAME: Bebeto Alvarado  MRN:   [id number]  :   1990  ACCT. NUMBER: [id number]  DATE OF SERVICE: 21  START TIME: 12:30 PM  END TIME:  2:30 PM  FACILITATOR(S): Ayaz Childs LADC; Rafat Berry  TOPIC: BEH Group Therapy  Number of patients attending the group:  26  Group Length:  2 Hours    Group Therapy Type: Recovery strategies    Summary of Group / Topics Discussed:    Relationship/socialization and Emotions/expression      Group Attendance:  Attended group session    Patient's response to the group topic/interactions:  cooperative with task    Patient appeared to be Actively participating and Attentive.        Client specific details:  Tavo learned about relationships with self and others.

## 2021-09-04 NOTE — GROUP NOTE
Group Therapy Documentation    PATIENT'S NAME: Bebeto Alvarado  MRN:   [id number]  :   1990  ACCT. NUMBER: [id number]  DATE OF SERVICE: 21  START TIME:  9:00 AM  END TIME: 11:00 AM  FACILITATOR(S): Ayaz Childs LADC; Viktor Castillo LADC  TOPIC: BEH Group Therapy  Number of patients attending the group:  26  Group Length:  2 Hours    Group Therapy Type: Recovery strategies    Summary of Group / Topics Discussed:    Recovery Principles, Relationship/socialization, and Disease of addiction      Group Attendance:  Attended group session    Patient's response to the group topic/interactions:  cooperative with task and listened actively    Patient appeared to be Actively participating, Attentive and Engaged.        Client specific details:  Tavo learned about the role of addiction in relationships.

## 2021-09-04 NOTE — PROGRESS NOTES
Requested that weekend counselors meet with pt to check in as he has been noted by staff to be isolating and appears withdrawn.

## 2021-09-05 ENCOUNTER — HOSPITAL ENCOUNTER (OUTPATIENT)
Dept: BEHAVIORAL HEALTH | Facility: CLINIC | Age: 31
End: 2021-09-05
Attending: FAMILY MEDICINE
Payer: COMMERCIAL

## 2021-09-05 ENCOUNTER — TELEPHONE (OUTPATIENT)
Dept: BEHAVIORAL HEALTH | Facility: CLINIC | Age: 31
End: 2021-09-05

## 2021-09-05 VITALS — TEMPERATURE: 98.2 F | OXYGEN SATURATION: 97 %

## 2021-09-05 PROCEDURE — 1002N00001 HC LODGING PLUS FACILITY CHARGE ADULT

## 2021-09-05 PROCEDURE — H2035 A/D TX PROGRAM, PER HOUR: HCPCS | Mod: HQ

## 2021-09-05 NOTE — GROUP NOTE
Psychoeducation Group Documentation    PATIENT'S NAME: Bebeto Alvraado  MRN:   [id number]  :   1990  ACCT. NUMBER: [id number]  DATE OF SERVICE: 21  START TIME: 12:30 PM  END TIME:  1:30 PM  FACILITATOR(S): Sonia Dejesus; Viktor Castillo LADC  TOPIC: BEH Pyschoeducation  Number of patients attending the group:  25  Group Length:  1 Hours    Skills Group Therapy Type: Emotion regulation skills    Summary of Group / Topics Discussed:    Coping/DBT skills          Group Attendance:  Attended group session    Patient's response to the group topic/interactions:  cooperative with task    Patient appeared to be Engaged.         Client specific details: Patient appeared engaged in activity with active listening and attending to the hand ou  .

## 2021-09-05 NOTE — GROUP NOTE
Psychoeducation Group Documentation    PATIENT'S NAME: Bebeto Alvarado  MRN:   [id number]  :   1990  ACCT. NUMBER: [id number]  DATE OF SERVICE: 21  START TIME:  8:45 AM  END TIME: 10:35 AM  FACILITATOR(S): Kacy Mckeon RN; Viktor Castillo LADC  TOPIC: BEH Pyschoeducation  Number of patients attending the group:  26  Group Length:  2 Hours    Skills Group Therapy Type: Lecture presentation AIDS/HIV    Summary of Group / Topics Discussed:    Relationship/social skills, Symptom management skills, and Medication management skills          Group Attendance:  Attended group session    Patient's response to the group topic/interactions:  cooperative with task    Patient appeared to be Attentive and Engaged.         Client specific details:  .

## 2021-09-05 NOTE — TELEPHONE ENCOUNTER
Pt reports increased depressive symptoms. Staff have noticed pt appearing withdrawn and pt spends most of free time in his room sleeping.  Pt is currently not taking his remeron and is inconsistent with taking his suboxone BID. Please advise if a  Psych consult would be appropriate.

## 2021-09-06 ENCOUNTER — HOSPITAL ENCOUNTER (OUTPATIENT)
Dept: BEHAVIORAL HEALTH | Facility: CLINIC | Age: 31
End: 2021-09-06
Attending: FAMILY MEDICINE
Payer: COMMERCIAL

## 2021-09-06 VITALS — OXYGEN SATURATION: 100 % | TEMPERATURE: 98 F

## 2021-09-06 DIAGNOSIS — F33.1 MODERATE EPISODE OF RECURRENT MAJOR DEPRESSIVE DISORDER (H): Primary | ICD-10-CM

## 2021-09-06 PROCEDURE — 1002N00001 HC LODGING PLUS FACILITY CHARGE ADULT

## 2021-09-06 PROCEDURE — 90791 PSYCH DIAGNOSTIC EVALUATION: CPT | Performed by: NURSE PRACTITIONER

## 2021-09-06 PROCEDURE — H2035 A/D TX PROGRAM, PER HOUR: HCPCS | Mod: HQ

## 2021-09-06 RX ORDER — BUPROPION HYDROCHLORIDE 150 MG/1
150 TABLET ORAL EVERY MORNING
Qty: 5 TABLET | Refills: 0 | Status: SHIPPED | OUTPATIENT
Start: 2021-09-06 | End: 2021-09-14 | Stop reason: DRUGHIGH

## 2021-09-06 RX ORDER — BUPROPION HYDROCHLORIDE 300 MG/1
300 TABLET ORAL EVERY MORNING
Qty: 30 TABLET | Refills: 1 | Status: SHIPPED | OUTPATIENT
Start: 2021-09-06 | End: 2021-10-22

## 2021-09-06 NOTE — CONSULTS
[facility], [place]   Initial Psychiatric Consult   Consult date: September 6, 2021         Reason for Consult, requesting source:    Increased depression. Appearing withdrawn, sleeping more  Requesting source: Yoseph Melo        HPI:   Mr. Tavo Alvarado is a 30 year old male who was admitted to [facility] treatment program on 8/31/21. PMH significant for opioid use disorder, depression, anxiety, and tobacco use disorder. Psychiatry is consulted for management of depression.     Per chart review, patient is being followed by addiction medicine, Dr. Fisher, in the outpatient setting. Pt has a 12 year history of using opioids, most recently had been using heroin/fentanyl by IV route. His last use of opioids was 8/30. His opioid use disorder is being managed with oral buprenorphine 12 mg daily with plan to transition to Sublocade.     On interview today, patient is reporting an increase in depressive symptoms since arriving to [facility]. He does have a history of depression dating back to his youth. Currently, struggling with low energy, low motivation, anhedonia, hopelessness, fatigue, low appetite, middle and initial insomnia, poor concentration. Appetite is low and he fills up quickly. He denies any recent suicidal ideation or self-injurious behavior. Reports that when he was using heroin, he was sometimes tempted to inject more in order to go to sleep and not wake up. He denies having actually done this. Denies any history of suicide attempts. Denies history of olga. Denies history of psychosis. He was prescribed mirtazapine, is only using this occasionally at night to help with sleep. Has history of bupropion trial and felt that this medication was helpful, but he stopped taking it when he began using again. He reports having tolerated bupropion well without any adverse effects.         Past Psychiatric History:     Denies hx of psychiatric hospitalizations or suicide  attempts. Seeing Dr. Fisher in the addiction clinic. Does not have an outpatient psychiatrist or psychotherapist. Has trialed bupropion up to 300 mg daily, as well as mirtazapine 7.5 to 15 mg at bedtime. Has used hydroxyzine prn for anxiety. No hx of ECT or MH commitment.         Substance Use and History:   Most recently had been using opioids daily for several months by injection and snorting. Has used off and on for 12 years. Uses cannabis about once per month. Has used amphetamines and cocaine in the past but this has not been an issue. Denies recent alcohol use. This is his third time at , had also attended in 2019 and for 10 days in 2020. No hx of CD commitment.         Past Medical History:   PAST MEDICAL HISTORY:   Past Medical History:   Diagnosis Date     Opiate abuse, continuous (H)      Tobacco use        PAST SURGICAL HISTORY: No past surgical history on file.          Family History:   FAMILY HISTORY:   Family History   Problem Relation Age of Onset     Diabetes Mother      Hypertension Mother      No Known Problems Father      No Known Problems Brother      Suicide Other      Reports mother with hx of mental health and chemical dependency issues        Social History:   Pt reports living in an apartment with a friend. Roommate does not use heroin. Patient is not currently working, is receiving unemployment.          Physical ROS:   The patient endorsed low appetite. The remainder of 10-point review of systems was negative except as noted in HPI.         PTA Medications:     Current Outpatient Medications   Medication     acetaminophen (TYLENOL) 325 MG tablet     alum & mag hydroxide-simethicone (MAALOX) 200-200-20 MG/5ML SUSP suspension     buprenorphine HCl-naloxone HCl (SUBOXONE) 2-0.5 MG per film     buprenorphine HCl-naloxone HCl (SUBOXONE) 8-2 MG per film     buprenorphine HCl-naloxone HCl (SUBOXONE) 8-2 MG per film     cloNIDine (CATAPRES) 0.1 MG tablet     gabapentin (NEURONTIN) 300 MG  "capsule     guaiFENesin (ROBITUSSIN) 20 mg/mL SOLN solution     hydrOXYzine (VISTARIL) 25 MG capsule     ibuprofen (ADVIL/MOTRIN) 200 MG tablet     loratadine (CLARITIN) 10 MG tablet     melatonin 3 MG tablet     mirtazapine (REMERON) 15 MG tablet     naloxone (NARCAN) 4 MG/0.1ML nasal spray     ondansetron (ZOFRAN-ODT) 4 MG ODT tab     phenol-menthol (CEPASTAT) 14.5 MG lozenge     senna-docusate (SENOKOT-S/PERICOLACE) 8.6-50 MG tablet     No current facility-administered medications for this encounter.     Facility-Administered Medications Ordered in Other Encounters   Medication     Self Administer Medications: Behavioral Services              Allergies:     Allergies   Allergen Reactions     Codeine Unknown     Pt has been told.          Labs:   No results found for this or any previous visit (from the past 48 hour(s)).       Physical and Psychiatric Examination:     There were no vitals taken for this visit.  Weight is 0 lbs 0 oz  There is no height or weight on file to calculate BMI.    Physical Exam:  I have reviewed the physical exam as documented by by the medical team and agree with findings and assessment and have no additional findings to add at this time.    Mental Status Exam:    Appearance: age-appearing, casually dressed, appropriate hygiene and grooming, in no apparent distress  Behavior: cooperative, pleasant and calm  Eye contact: appropriate  Orientation: alert and oriented to time, place and person  Movements: no tics, tremors, or dystonia on observation  Mood: \"depressed\"  Affect: restricted in range, mood-congruent, stable  Speech: Normal rate, rhythm, tone  Language: fluent, with appropriately placed inflections, good articulation  Memory: no impairment in immediate, recent, or remote memory  Fund of knowledge: average for development based on conversation  Concentration: attentive   Thought process and content: linear, logical, coherent. No loosened associations. Denies auditory or visual " hallucinations. No delusions or paranoia endorsed or elicited.   Insight: fair  Judgement: fair  Safety: denies suicidal or homicidal ideation, plan, or intent           DSM-5 Diagnosis:   #1 Unspecified depressive disorder, r/o MDD versus substance-induced depressive disorder  #2 ADHD, by history  #3 Opioid use disorder, on medication assisted treatment  #4 Tobacco use disorder          Assessment:   Mr. Tavo Alvarado is a 30 year old male who was admitted to [facility] treatment program on 8/31/21. PMH significant for opioid use disorder, depression, anxiety, and tobacco use disorder. Psychiatry is consulted for management of depression.     Mr. Alvarado is seen for evaluation today. Reports an increase in depressive symptoms since his arrival to . He endorses a long history of depressive symptoms. Currently struggling with low energy, low motivation, poor sleep, low appetite, hopelessness. Staff have noted him to be increasingly withdrawn, isolative, sleeping in free time. Pt endorses having trialed bupropion in the past with good results, stopped taking while he was using illicit chemicals. He has only been intermittently taking mirtazapine prescribed by Dr. Fisher. He remains on Suboxone maintenance, but per nursing has apparently been missing some doses. Would be a good candidate for long-acting formulation. Would benefit from psychotherapy as an outpatient.           Summary of Recommendations:   1) Start Wellbutrin  mg daily x 5 days, then increase to 300 mg daily  2) Continue mirtazapine 7.5 mg to 15 mg at bedtime for sleep and anxiety  3) Suboxone per Dr. Fisher  4) Pt may benefit from follow-up with a psychotherapist as an outpatient  5) Thank you, re-consult psychiatry as needed      Rafael Sibley, JOEP-BC, APRN, CNP  [facility]   Contact information available via [facility] Paging/Directory  If I am unavailable, please contact [facility] at [phone]  for further guidance

## 2021-09-06 NOTE — GROUP NOTE
Group Therapy Documentation    PATIENT'S NAME: Bebeto Alvarado  MRN:   [id number]  :   1990  ACCT. NUMBER: [id number]  DATE OF SERVICE: 21  START TIME:  9:00 AM  END TIME: 11:00 AM  FACILITATOR(S): Zachayr Eckert LADC  TOPIC: BEH Group Therapy  Number of patients attending the group:  10  Group Length:  2 Hours    Group Therapy Type: Recovery strategies    Summary of Group / Topics Discussed:    Recovery Principles      Group Attendance:  Attended group session    Patient's response to the group topic/interactions:  cooperative with task    Patient appeared to be Attentive.        Client specific details:  Tavo attended AM group. Patient checked in, talked about having support of friends/family, and took part in a discussion on choices. Patient also gave a peer feedback on a presented assignment.

## 2021-09-06 NOTE — PROGRESS NOTES
Patient:  Bebeto Harris Ilhilda    Day Monday Tuesday Wednesday Thursday Friday Saturday Luis Manuel  Group Hours    4 hours 4 hours 5 hours 4 hours 4 hours 4 hours 2 hours  Skills Hours    0 hours 1.0 hours 0 hours 1.0 hours 0 hours 0 hours 1 hours  Individual Session (LADC)     0 hours 0 hours 0 hours 0 hours 0 hours 0 hours 0 hours  Individual Session  (psychotherapy)    0 hours 0 hours 0 hours 0 hours 0 hours 0 hours 0 hours  Peer-led Recovery Group    1.0 hours 1.0 hours 1.0 hours 1.0 hours 1.0 hours 1.0 hours 1.0 hours              Adult CD Progress Note and Treatment Plan Review     Attendance  Please refer to OP BEH CD Adult Attendance Record Documentation Flowsheet    Support group attended this week: Yes    Reporting sobriety: Yes    Treatment Plan     Treatment Plan Review competed on: 9/6/2021      Client preferred learning style:   Hands-on  Demonstration  Reading/ written    Staff Members contributing: Misael Sawant [facility]; Zachary Eckert [facility]; Rafat Gonsalez [facility]                         Received Supervision: No    Client: Pt contributed to goals and plan.    Client received copy of plan/revised plan: Yes    Client agrees with plan/revised plan: Yes         Changes to Treatment Plan: None at this time    New Goals added since last review: Relapse prevention, address mental health needs, build sober support network, actively find a sponsor, attend 2-3 12-Step meetings per week, tx plan assignments, and attend individual therapy sessions.    Goals worked on since last review: Aftercare planning, sobriety, tx plan assignments, group therapy, build sober support network, psychoeducation.    Strategies effective: Yes    Strategies need these changes: None at this time    1) Care Coordination Activities: Pt is exploring his after care options at this time. Pt expressed interest in sober housing after discharge from [facility]   2) Medical, Mental Health, and other appointments the client attended: Pt reported no  "appointments this past week.  3) Medication issues: Pt reported no concerns at this time.  4) Physical and mental health problems: Pt reported no concerns at this time.  5) Any changes in Vulnerable Adult Status? No. If yes, add to treatment plan and individual abuse prevention plan.  6) Review and evaluation of the individual abuse prevention plan: Current IAPP for this program is adequate for this client.    ASAM Risk Rating:    Dimension 1 0: Pt reported his last use date as 8/30/2021. Pt reported no PAWS symptomatology this past week. Pt will be monitored.     Dimension 2 0: Pt denied having medical or medication issues this past week. Pt did not attend any healthcare appointments this past week and denied scheduling any appointments for the future.  Patient denies any biomedical concerns that would interfere with full participation in treatment programming at this time. Patient reports that he is currently medication compliant and appears able to access medical aid as needed.  Patient will continue to be monitored throughout treatment.    Dimension 3 2: Pt denied having suicidal thoughts at this time. Pt reported  changes in his mood this past week due to \"getting happier each day.\" Pt reported no changes in his stress level this past week. Pt reported using \"nothing\" as his coping techniques for dealing with difficult emotions this past week. Pt reported no triggers to use this past week.    Dimension 4 2: Pt expresses internal motivation for change. Pt is active in group process, accepts and provides feedback, has good insight, and appears engaged. Pt is supportive of his group peers. Pt reported that \"getting his lefe back\" is what/ who motivated him to be sober and to stay in treatment this week. Pt reports that his relationship with peers and staff was \"better\" this past week.    Dimension 5 4: Pt reported that his cravings were at a 1 this past week on a scale of 1 to 10, 10 being the highest/ most " "severe. Pt reported that \"nothing\" has been his coping skill he used to manage cravings this past week.    Dimension 6 4: Pt is exploring his after care options at this time. Pt expressed interest in sober housing after discharge from +.     Guide to C-SSRS Risk Ratings   NO IDEATION:  with no active thoughts IDEATION: with a wish to die. IDEATION: with active thoughts. Risk Ratings   If Yes No No 0 - Very Low Risk   If NA Yes No 1 - Low Risk   If NA Yes Yes 2 - Low/moderate risk   IDEATION: associated thoughts of methods without intent or plan INTENT: Intent to follow through on suicide PLAN: Plan to follow through on suicide Risk Ratings cont...   If Yes No No 3 - Moderate Risk   If Yes Yes No 4 - High Risk   If Yes Yes Yes 5 - High Risk   The patient's ADDITIONAL RISK FACTORS and lack of PROTECTIVE FACTORS may increase their overall suicide risk ratings.     Pt's current risk rating: \"Low Risk\"     Any changes in Vulnerable Adult Status?    If yes, add to treatment plan and individual abuse prevention plan.    Family Involvement:   Pt reports that his family and friends are supportive of his recovery.     Data:   Pt offered feedback, had good insight, and patient actively participated in group. Pt shares openly during group check-in.  Pt reported that his recreation activities he engaged in this past week were: \"throwing the football\"  Pt is also gaining trust of peers and counselors.      Intervention:   Counselor feedback  Group feedback  Relapse prevention  Aftercare planning  Cognitive behavior therapy  Counselor feedback  Education  Emotional management  Motivational enhancement therapy   Twelve Step facilitation  Mental health education  Pt and counselor reviewed and signed ISP and assessment summary.      Assessment:   Stages of Change Model  Contemplation     Appears/ Sounds:  Cooperative  Motivated  Engaged      Plan:  Focus on recovery environment  Monitor emotional/physical health    Continue group " therapy, go to AA/NA meetings when available, work with sponsor, build sober support network, engage in daily structured activities, and have sober fun.            YOVANA Tuttle

## 2021-09-06 NOTE — GROUP NOTE
Group Therapy Documentation    PATIENT'S NAME: Bebeto Alvarado  MRN:   [id number]  :   1990  ACCT. NUMBER: [id number]  DATE OF SERVICE: 21  START TIME: 1:30 PM  END TIME:  2:30 PM  FACILITATOR(S): Zachary Eckert LADC; Barb Muñoz  TOPIC: BEH Group Therapy  Number of patients attending the group:  10  Group Length:  1 Hour    Group Therapy Type: Recovery strategies    Summary of Group / Topics Discussed:    Spiritual Care      Group Attendance:  Attended group session    Patient's response to the group topic/interactions:  cooperative with task    Patient appeared to be Attentive.        Client specific details:  Tavo attended the afternoon Spiritual Care group. Patient was attentive and engaged.

## 2021-09-07 ENCOUNTER — HOSPITAL ENCOUNTER (OUTPATIENT)
Dept: BEHAVIORAL HEALTH | Facility: CLINIC | Age: 31
End: 2021-09-07
Attending: FAMILY MEDICINE
Payer: COMMERCIAL

## 2021-09-07 ENCOUNTER — OFFICE VISIT (OUTPATIENT)
Dept: BEHAVIORAL HEALTH | Facility: CLINIC | Age: 31
End: 2021-09-07
Payer: COMMERCIAL

## 2021-09-07 VITALS — TEMPERATURE: 98.3 F | OXYGEN SATURATION: 98 %

## 2021-09-07 DIAGNOSIS — F11.20 OPIOID USE DISORDER, MODERATE, DEPENDENCE (H): Primary | ICD-10-CM

## 2021-09-07 DIAGNOSIS — F19.90 IVDU (INTRAVENOUS DRUG USER): ICD-10-CM

## 2021-09-07 DIAGNOSIS — F17.200 TOBACCO USE DISORDER: ICD-10-CM

## 2021-09-07 PROCEDURE — 1002N00001 HC LODGING PLUS FACILITY CHARGE ADULT

## 2021-09-07 PROCEDURE — 99214 OFFICE O/P EST MOD 30 MIN: CPT | Performed by: FAMILY MEDICINE

## 2021-09-07 PROCEDURE — H2035 A/D TX PROGRAM, PER HOUR: HCPCS | Mod: HQ

## 2021-09-07 RX ORDER — BUPRENORPHINE AND NALOXONE 8; 2 MG/1; MG/1
FILM, SOLUBLE BUCCAL; SUBLINGUAL
Qty: 14 FILM | Refills: 0 | COMMUNITY
Start: 2021-09-07 | End: 2021-09-14

## 2021-09-07 NOTE — NURSING NOTE
[facility]      Rooming information:  Approximate last use of illicit opioids: 8/30/2021  Taking buprenorphine? Yes:  As prescribed? Yes:   Number of buprenorphine films/tablets remaining currently: 8  Side effects related to buprenorphine (constipation, dry mouth, sedation?) No   Narcan currently available: Yes  Other recent substance use:    NICOTINE-Yes:    Denies  If using nicotine, ready to quit? No    Point of care urine drug screen positive for: buprenorphine  *POC urine drug screen does not screen for Fentanyl      Insurance needs: active    Housing needs: stable at     Contact information up to date? yes    3rd Party Involvement none today  (please obtain ANAIS if pt would like to include)    Primary care provider: NEEL Tariq CNP     Mental health provider: marylin (follow up on MH referral if needed)    Janet Brice CMA  September 7, 2021  1:51 PM

## 2021-09-07 NOTE — GROUP NOTE
Group Therapy Documentation    PATIENT'S NAME: Bebeto Alvarado  MRN:   [id number]  :   1990  ACCT. NUMBER: [id number]  DATE OF SERVICE: 21  START TIME: 12:30 PM  END TIME:  1:30 PM  FACILITATOR(S): Remberto Cobos; Rafat Gonsalez LADC  TOPIC: BEH Group Therapy  Number of patients attending the group:  8  Group Length:  1 Hour    Group Therapy Type: Recovery strategies    Summary of Group / Topics Discussed:    Recovery Principles, Relationship/socialization, Balanced lifestyle, Disease of addiction, Emotions/expression, Leisure explorations/use of leisure time, and Relapse prevention      Group Attendance:  Attended group session    Patient's response to the group topic/interactions:  cooperative with task    Patient appeared to be Attentive and Engaged.        Client specific details:  Tavo attended afternoon group.  He participated in the discussion on shame, triggers, and relapse prevention.  He was in the first hour of group, and missed the second hour to go to an appointment.

## 2021-09-07 NOTE — PROGRESS NOTES
[facility] Return Visit    ASSESSMENT/PLAN                                                      1. Opioid use disorder, moderate, dependence (H)  Pt reporting control of symptoms with current buprenorphine dose  Continue buprenorphine 8mg/day sublingually for now  Initial Sublocade 300mg injection scheduled 9/15/21  9/16/21 discontinue sublingual buprenorphine  Continue PHP/[facility] and follow counselors' recommendations  - buprenorphine HCl-naloxone HCl (SUBOXONE) 8-2 MG per film; 1 film SL daily  Dispense: 14 Film; Refill: 0    2. Tobacco use disorder  Not ready to quit at this time    3. IVDU (intravenous drug user)  Pt aware of most recent negative ID screening results; discussed recommendation to retest in 2-3 months      Return in about 2 weeks (around 9/21/2021) for Follow up, with me, in person which will be one week after first Sublocade. .    9/28/21 8:30 with Dr. Alannah Whitaker for long term buprenorphine management.   SUBJECTIVE                                                      CC/HPI:  Bebeto Alvarado is a 30 year old male with PMH PSUD primarily opioids on buprenorphine who presents to the [facility] for return visit.      History of use/addiction :  Opioids: Pt started using opioids 2009 using opioid pills, then progressed to smoking heroin 2011, daily heroin use began 2015,  then IV use starting 2019.     IV drug use: Yes: most recently 8/30/21 - endorses sharing cotton with another person during use  History of overdose: Yes: 2019  Previous treatments : Yes: multiple; most recently admitted to [facility] 8/31/21; h/o buprenorphine treatment including XR buprenorphine in 2020  Longest period of sobriety:6-9 months on buprenorphine  Medical complications related to substance use: none known  Hepatitis C Status  11/14/19 negative  HIV Status 11/14/19 negative      I last met with pt on 8/31/21  A/P at that time was:  1. Opioid use disorder, moderate, dependence (H)  Pt  with h/o opioid use starting 2009, IV heroin/fentanyl use since 2019.  Last use 8/30/21.    Reviewed timing of re-initiation of buprenorphine in the setting of fentanyl use.  Pt reporting h/o precipitated withdrawal when he has attempted this 36hrs from his last use.  Planning to wait 48 hrs from last use as noted below.    Titrate buprenorphine up to 16mg/day  Pt remains interested in transfer to XR buprenorphine, will arrange for this when pt has been taking at least 8mg sublingual buprenorphine daily for one week.   Sublocade approval obtained previously.   Planning eventual transfer to Addiction Medicine for long term care  Continue PHP/[facility] and follow counselors' recommendations  Baseline labs and ID screening as noted; recommend retesting in 2-3 months.     - CBC with Platelets & Differential; Future  - COMPREHENSIVE METABOLIC PANEL; Future  - Hepatitis B core antibody; Future  - Hepatitis B Surface Antibody; Future  - HIV Antigen Antibody Combo; Future  - Hepatitis C Screen Reflex to HCV RNA Quant and Genotype; Future  - Hepatitis B surface antigen; Future  - buprenorphine HCl-naloxone HCl (SUBOXONE) 2-0.5 MG per film; Take 1 sl when at least 48 hrs from last use and experiencing at least moderate withdrawal symptoms; if no increase in  withdrawal symptoms after 30min, start 8mg/2mg films as directed.  Dispense: 1 Film; Refill: 0  - buprenorphine HCl-naloxone HCl (SUBOXONE) 8-2 MG per film; If no precipitated withdrawal 30min after taking 2mg/0.5mg Suboxone film, take one film SL.  May then take 1/2 film SL q2-4 hours prn withdrawal symptoms.  Day 2, start 1 film SL bid  Dispense: 14 Film; Refill: 0    2. Tobacco use disorder  Currently smoking about 1/2 ppd, not ready to quit at this time    3. Screen for STD (sexually transmitted disease)  - NEISSERIA GONORRHOEA PCR  - CHLAMYDIA TRACHOMATIS PCR  - Hepatitis B core antibody; Future  - Hepatitis B Surface Antibody; Future  - Treponema Abs w Reflex to  RPR and Titer; Future  - HIV Antigen Antibody Combo; Future  - Hepatitis C Screen Reflex to HCV RNA Quant and Genotype; Future  - Hepatitis B surface antigen; Future    4. Opioid withdrawal (H)  - ondansetron (ZOFRAN-ODT) 4 MG ODT tab; Take 1 tablet (4 mg) by mouth every 8 hours as needed for nausea or vomiting  Dispense: 15 tablet; Refill: 0          Today, pt states he has been feeling tired this first week, starting to feel better.  He is only taking buprenorphine 8mg once daily.  He is scheduled for Sublocade #1 on 9/15/21.   He denies significant opioid cravings with buprenorphine 8mg/day.      He started bupropion for depression today after psychiatry was consulted by [facility] staff on pt's behalf.          [place] Prescription Drug Monitoring Program Reviewed:  Yes; as expected        Past Medical History:   Diagnosis Date     Opiate abuse, continuous (H)      Tobacco use      PAST PSYCHIATRIC HISTORY:  Diagnoses- depression  Suicide Attempts: No   Hospitalizations: No     No past surgical history on file.    Medications:  buprenorphine HCl-naloxone HCl (SUBOXONE) 8-2 MG per film, 1 film SL daily  acetaminophen (TYLENOL) 325 MG tablet, Take 325-650 mg by mouth every 4 hours as needed for mild pain  alum & mag hydroxide-simethicone (MAALOX) 200-200-20 MG/5ML SUSP suspension, Take 30 mLs by mouth every 6 hours as needed for indigestion  buPROPion (WELLBUTRIN XL) 150 MG 24 hr tablet, Take 1 tablet (150 mg) by mouth every morning for 5 days, then increase to 300 mg daily  buPROPion (WELLBUTRIN XL) 300 MG 24 hr tablet, Take 1 tablet (300 mg) by mouth every morning after completing 150 mg prescription  gabapentin (NEURONTIN) 300 MG capsule, Take 1 capsule (300 mg) by mouth 4 times daily as needed (withdrawal symptoms)  guaiFENesin (ROBITUSSIN) 20 mg/mL SOLN solution, Take 10 mLs by mouth every 4 hours as needed for cough  ibuprofen (ADVIL/MOTRIN) 200 MG tablet, Take 400 mg by mouth every 6 hours as needed for mild  pain  loratadine (CLARITIN) 10 MG tablet, Take 10 mg by mouth daily as needed for allergies  melatonin 3 MG tablet, Take 3 mg by mouth nightly as needed for sleep  mirtazapine (REMERON) 15 MG tablet, 1/2-1 tablet po qhs  naloxone (NARCAN) 4 MG/0.1ML nasal spray, Spray 1 spray (4 mg) into one nostril alternating nostrils as needed for opioid reversal every 2-3 minutes until assistance arrives  ondansetron (ZOFRAN-ODT) 4 MG ODT tab, Take 1 tablet (4 mg) by mouth every 8 hours as needed for nausea or vomiting  phenol-menthol (CEPASTAT) 14.5 MG lozenge, Place 1 lozenge inside cheek every 2 hours as needed for moderate pain  senna-docusate (SENOKOT-S/PERICOLACE) 8.6-50 MG tablet, Take 2 tablets by mouth daily as needed for constipation    Self Administer Medications: Behavioral Services        Family History   Problem Relation Age of Onset     Diabetes Mother      Hypertension Mother      No Known Problems Father      No Known Problems Brother      Suicide Other        Allergies   Allergen Reactions     Codeine Unknown     Pt has been told.         Social History  Housing status: in [facility]  Employment status: Unemployed, not seeking work  Relationship status: Single broke up with his SO 8/29/21  Children: no children; ex-SO has a child           REVIEW OF SYSTEMS:  Withdrawal symptoms as above    OBJECTIVE                                                      There were no vitals taken for this visit.    Physical Exam  HENT:      Head: Normocephalic and atraumatic.   Eyes:      General: No scleral icterus.     Conjunctiva/sclera: Conjunctivae normal.   Pulmonary:      Effort: Pulmonary effort is normal.   Neurological:      Mental Status: He is alert and oriented to person, place, and time.      Coordination: Coordination is intact.      Gait: Gait is intact.   Psychiatric:         Attention and Perception: Attention and perception normal.         Mood and Affect: Affect normal. Mood is anxious.         Speech:  Speech normal.         Behavior: Behavior is cooperative.         Thought Content: Thought content normal. Thought content does not include suicidal ideation.         Cognition and Memory: Cognition normal.      Comments: Insight and judgement are fair         Labs:    UDS: morphine  *POC urine drug screen does not screen for Fentanyl    Recent Results (from the past 240 hour(s))   COVID-19 VIRUS (CORONAVIRUS) BY PCR (EXTERNAL RESULT)    Collection Time: 08/29/21  5:35 AM   Result Value Ref Range    COVID-19 Virus by PCR (External Result) Negative Negative   Asymptomatic COVID-19 Virus (Coronavirus) by PCR Nasopharyngeal    Collection Time: 08/31/21 11:49 AM    Specimen: Nasopharyngeal; Swab   Result Value Ref Range    SARS CoV2 PCR Negative Negative   NEISSERIA GONORRHOEA PCR    Collection Time: 08/31/21  2:26 PM    Specimen: Urine, Voided   Result Value Ref Range    Neisseria gonorrhoeae Negative Negative   CHLAMYDIA TRACHOMATIS PCR    Collection Time: 08/31/21  2:26 PM    Specimen: Urine, Voided   Result Value Ref Range    Chlamydia trachomatis Negative Negative   COMPREHENSIVE METABOLIC PANEL    Collection Time: 08/31/21  2:42 PM   Result Value Ref Range    Sodium 140 133 - 144 mmol/L    Potassium 4.9 3.4 - 5.3 mmol/L    Chloride 105 94 - 109 mmol/L    Carbon Dioxide (CO2) 32 20 - 32 mmol/L    Anion Gap 3 3 - 14 mmol/L    Urea Nitrogen 19 7 - 30 mg/dL    Creatinine 0.98 0.66 - 1.25 mg/dL    Calcium 9.0 8.5 - 10.1 mg/dL    Glucose 95 70 - 99 mg/dL    Alkaline Phosphatase 87 40 - 150 U/L    AST 26 0 - 45 U/L    ALT 33 0 - 70 U/L    Protein Total 7.2 6.8 - 8.8 g/dL    Albumin 3.5 3.4 - 5.0 g/dL    Bilirubin Total 0.2 0.2 - 1.3 mg/dL    GFR Estimate >90 >60 mL/min/1.73m2   Hepatitis B core antibody    Collection Time: 08/31/21  2:42 PM   Result Value Ref Range    Hepatitis B Core Antibody Total Nonreactive Nonreactive   Hepatitis B Surface Antibody    Collection Time: 08/31/21  2:42 PM   Result Value Ref Range     Hepatitis B Surface Antibody 2.90 <8.00 m[IU]/mL   Treponema Abs w Reflex to RPR and Titer    Collection Time: 08/31/21  2:42 PM   Result Value Ref Range    Treponema Antibody Total Nonreactive Nonreactive   HIV Antigen Antibody Combo    Collection Time: 08/31/21  2:42 PM   Result Value Ref Range    HIV Antigen Antibody Combo Nonreactive Nonreactive   Hepatitis C Screen Reflex to HCV RNA Quant and Genotype    Collection Time: 08/31/21  2:42 PM   Result Value Ref Range    Hepatitis C Antibody Nonreactive Nonreactive   Hepatitis B surface antigen    Collection Time: 08/31/21  2:42 PM   Result Value Ref Range    Hepatitis B Surface Antigen Nonreactive Nonreactive   CBC with platelets and differential    Collection Time: 08/31/21  2:42 PM   Result Value Ref Range    WBC Count 11.4 (H) 4.0 - 11.0 10e3/uL    RBC Count 4.53 4.40 - 5.90 10e6/uL    Hemoglobin 12.3 (L) 13.3 - 17.7 g/dL    Hematocrit 38.2 (L) 40.0 - 53.0 %    MCV 84 78 - 100 fL    MCH 27.2 26.5 - 33.0 pg    MCHC 32.2 31.5 - 36.5 g/dL    RDW 13.2 10.0 - 15.0 %    Platelet Count 360 150 - 450 10e3/uL    % Neutrophils 61 %    % Lymphocytes 28 %    % Monocytes 8 %    % Eosinophils 2 %    % Basophils 1 %    % Immature Granulocytes 0 %    NRBCs per 100 WBC 0 <1 /100    Absolute Neutrophils 6.9 1.6 - 8.3 10e3/uL    Absolute Lymphocytes 3.2 0.8 - 5.3 10e3/uL    Absolute Monocytes 0.9 0.0 - 1.3 10e3/uL    Absolute Eosinophils 0.3 0.0 - 0.7 10e3/uL    Absolute Basophils 0.1 0.0 - 0.2 10e3/uL    Absolute Immature Granulocytes 0.1 (H) <=0.0 10e3/uL    Absolute NRBCs 0.0 10e3/uL           Patient counseling completed today:  Discussed mechanism of action, potential risks/benefits/side effects of medications and other recommendations above.   Discussed risk of precipitated withdrawal with initiation of buprenorphine in the presence of full opioid agonists. Recommend pt keep naloxone in their possession and reviewed other aspects of harm reduction to reduce risk of overdose  with return to use.   Recommended avoiding concurrent use of alcohol, benzodiazepines or other sedatives with buprenorphine or other opioids.  Discussed importance of avoiding isolation, building a network of supportive relationships, avoiding people/places/things associated with past use to reduce risk of relapse; including motivational interviewing regarding psychosocial treatment for addiction.     At least 30 min spent in review of medical record,  review, obtaining histories, reviewing symptoms, discussing pt's goals for treatment, counseling noted above.    CLAUDIA CHRISTOPHER MD    [place] [id number] [address] [phone]

## 2021-09-07 NOTE — GROUP NOTE
Group Therapy Documentation    PATIENT'S NAME: Bebeto Alvarado  MRN:   [id number]  :   1990  ACCT. NUMBER: [id number]  DATE OF SERVICE: 21  START TIME:  9:00 AM  END TIME: 11:00 AM  FACILITATOR(S): Zachary Eckert LADC; Remberto Cobos  TOPIC: BEH Group Therapy  Number of patients attending the group:  9  Group Length:  2 Hours    Group Therapy Type: Recovery strategies    Summary of Group / Topics Discussed:    Recovery Principles      Group Attendance:  Attended group session    Patient's response to the group topic/interactions:  cooperative with task    Patient appeared to be Attentive.        Client specific details:  Tavo attended AM group. He checked in, and talk part in a discussion about relationships in recovery, gratitude, and also took a mindfulness walk.

## 2021-09-07 NOTE — GROUP NOTE
Group Therapy Documentation    PATIENT'S NAME: Bebeto Alvarado  MRN:   [id number]  :   1990  ACCT. NUMBER: [id number]  DATE OF SERVICE: 21  START TIME:  3:00 PM  END TIME:  4:00 PM  FACILITATOR(S): Rafat Gonsalez LADC  TOPIC: BEH Group Therapy  Number of patients attending the group:  9  Group Length:  1 Hours    Group Therapy Type: Recovery strategies    Summary of Group / Topics Discussed:    Self-care activities      Group Attendance:  Attended group session    Patient's response to the group topic/interactions:  cooperative with task    Patient appeared to be Actively participating.        Client specific details:  Tavo participated and interacted appropriately with peers and staff in PM group. No triggers to use noted or discussed.

## 2021-09-08 ENCOUNTER — TELEPHONE (OUTPATIENT)
Dept: BEHAVIORAL HEALTH | Facility: CLINIC | Age: 31
End: 2021-09-08

## 2021-09-08 NOTE — TELEPHONE ENCOUNTER
"Staff message from Dr. Fisher 9/8/21:  \"Tavo is scheduled to re-start Sublocade with 300mg injection scheduled 9/15/21.  He is currently taking buprenorphine sublingually 8mg/day.       Starting 9/16/21 sublingual buprenorphine will be discontinued.       He should have #8 8mg/2mg films at time of our 9/7 visit, so no additional films were prescribed.  Let me know if there is a delay or other problem that will require another rx for buprenorphine.       Pt has an in person appt with Dr. Whitaker in Addiction Medicine 9/28/21 at 8:30am transfer for long term buprenorphine management.   Please facilitate him getting to that appointment.\"      Routing to [facility] RN Marcell for follow up.    Veena Tadeo RN on 9/8/2021 at 10:50 AM    "

## 2021-09-09 ENCOUNTER — TELEPHONE (OUTPATIENT)
Dept: BEHAVIORAL HEALTH | Facility: CLINIC | Age: 31
End: 2021-09-09

## 2021-09-09 NOTE — TELEPHONE ENCOUNTER
----- Message from Pura Fisher sent at 9/9/2021 10:51 AM CDT -----  Regarding: RE: Pt discharged for testing positive for morphine today while in the program.  Yogesh Snider.      staff, please contact pt to determine his plans for Sublocade which is scheduled 9/15/21.    He will need to take buprenorphine 8mg/day to that date, and I recommend he be seen in  before his injection if he plans to proceed.  He then has follow-up with Dr. Whitaker 9/28/21.      If he does not plan to resume Sublocade, please cancel his appt, and I still encourage him to be seen in  within the next 6 days.        ----- Message -----  From: Yogesh Pearson RN  Sent: 9/8/2021   9:24 AM CDT  To: Pura Fisher  Subject: Pt discharged for testing positive for morphYogesh Ernst LP, RN

## 2021-09-09 NOTE — TELEPHONE ENCOUNTER
Left message for pt asking him to come in for a walk in before 9/15/21 and call the clinic back when he gets the message.

## 2021-09-13 NOTE — TELEPHONE ENCOUNTER
Third attempt made, writer left message with infusion center to cancel Sublocade appointment on 9/15/2021 as pt needs to be seen in Recovery Clinic before getting injection. Left message letting pt know injection was canceled until seen by clinic

## 2021-09-14 ENCOUNTER — OFFICE VISIT (OUTPATIENT)
Dept: BEHAVIORAL HEALTH | Facility: CLINIC | Age: 31
End: 2021-09-14
Payer: COMMERCIAL

## 2021-09-14 DIAGNOSIS — F11.20 OPIOID USE DISORDER, SEVERE, DEPENDENCE (H): ICD-10-CM

## 2021-09-14 PROCEDURE — 99214 OFFICE O/P EST MOD 30 MIN: CPT | Performed by: FAMILY MEDICINE

## 2021-09-14 RX ORDER — BUPRENORPHINE AND NALOXONE 8; 2 MG/1; MG/1
1 FILM, SOLUBLE BUCCAL; SUBLINGUAL 2 TIMES DAILY
Qty: 14 FILM | Refills: 0 | Status: SHIPPED | OUTPATIENT
Start: 2021-09-14 | End: 2021-09-27

## 2021-09-14 NOTE — NURSING NOTE
" [facility] - Recovery Clinic      Rooming information:  Approximate last use of illicit opioids: 9/13/2021  Taking buprenorphine? No, last took \"few days ago\" As prescribed? No  Number of buprenorphine films/tablets remaining currently: 0  Narcan currently available: No  Other recent substance use:    NICOTINE-Yes:    Methamphetamine   If using nicotine, ready to quit? No    Point of care urine drug screen positive for: amphetamines, buprenorphine, methamphetamines, morphine and THC  *POC urine drug screen does not screen for Fentanyl      Insurance needs: active    Housing needs: stable with mom, trying to get into a sober house today    Contact information up to date? yes    3rd Party Involvement none today (please obtain ANAIS if pt would like to include)    Primary care provider: NEEL Tariq CNP     Mental health provider: marylin (follow up on MH referral if needed)    Janet Brice CMA  September 14, 2021  1:29 PM      "

## 2021-09-14 NOTE — PROGRESS NOTES
[facility] - Recovery Clinic Return Visit    ASSESSMENT/PLAN                                                      1. Opioid use disorder, severe, dependence (H)  Pt reporting return to use of heroin/fentanyl, recently discharged from +.    Last use 9/13/21.   Reviewed directions for resumption of buprenorphine in the setting of fentanyl use, after waiting at least 24hrs from last use and the presence of at least 3 withdrawal symptoms start with 2mg test dose.  If tolerated, titrate up to 16mg/day.   Recommend pt take 16mg/day given uncontrolled symptoms at 8mg/day.    Refilled naloxone; pt reported he used his supply on another person in the last week x2.   Proceed with supportive housing and psychosocial treatment  - buprenorphine HCl-naloxone HCl (SUBOXONE) 8-2 MG per film; Place 1 Film under the tongue 2 times daily  Dispense: 14 Film; Refill: 0  - naloxone (NARCAN) 4 MG/0.1ML nasal spray; Spray 1 spray (4 mg) into one nostril alternating nostrils once as needed for opioid reversal every 2-3 minutes until assistance arrives  Dispense: 0.2 mL; Refill: 11        Return in about 1 week (around 9/21/2021) for Follow up, with me, in person.      SUBJECTIVE                                                      CC/HPI:  Bebeto Alvarado is a 30 year old male with PMH PSUD primarily opioids on buprenorphine who presents to the Recovery Clinic for return visit.      Brief History:  Pt was first evaluated in Recovery Clinic on 6/11/21.    Opioids: Pt started using opioids 2009 using opioid pills, then progressed to smoking heroin 2011, daily heroin use began 2015,  then IV use starting 2019.     IV drug use: Yes: most recently 8/30/21 - endorses sharing cotton with another person during use  History of overdose: Yes: 2019  Previous treatments : Yes: multiple; most recently admitted to [facility] 8/31/21; h/o buprenorphine treatment including XR buprenorphine in 2020  Longest period of sobriety:6-9 months on  "buprenorphine  Medical complications related to substance use: none known  Hepatitis C Status  11/14/19 negative  HIV Status 11/14/19 negative      I last met with pt on 9/7/21  A/P at that time was:  1. Opioid use disorder, moderate, dependence (H)  Pt reporting control of symptoms with current buprenorphine dose  Continue buprenorphine 8mg/day sublingually for now  Initial Sublocade 300mg injection scheduled 9/15/21  9/16/21 discontinue sublingual buprenorphine  Continue PHP/LP and follow counselors' recommendations  - buprenorphine HCl-naloxone HCl (SUBOXONE) 8-2 MG per film; 1 film SL daily  Dispense: 14 Film; Refill: 0    2. Tobacco use disorder  Not ready to quit at this time    3. IVDU (intravenous drug user)  Pt aware of most recent negative ID screening results; discussed recommendation to retest in 2-3 months      Pt was discharged from [facility] on 9/8/21 after having been found to have illicit opioids in the facility.      Today, pt states he last took buprenorphine \"a few days ago\" and returned to use of heroin/fentanyl, last use 9/13/21.  He reports very mild withdrawal symptoms currently.  He is currently staying with his mother, and has plans to move in to a sober house later on day of this visit.  He has a [place]/group home application for me to fill out today for funding of sober house.  He states he does plan to reengage in psychosocial treatment as well.  When asked about his UDS result, pt states the heroin he was using must have been adulterated with methamphetamine; he denies intentional use of methamphetamine.  When asked about his plans for Sublocade, he states he would like to wait until he is more stable to resume this.          [place] Prescription Drug Monitoring Program Reviewed:  Yes; as expected        Past Medical History:   Diagnosis Date     Opiate abuse, continuous (H)      Tobacco use      PAST PSYCHIATRIC HISTORY:  Diagnoses- depression  Suicide Attempts: No   Hospitalizations: " No     No past surgical history on file.    Medications:  buPROPion (WELLBUTRIN XL) 300 MG 24 hr tablet, Take 1 tablet (300 mg) by mouth every morning after completing 150 mg prescription  gabapentin (NEURONTIN) 300 MG capsule, Take 1 capsule (300 mg) by mouth 4 times daily as needed (withdrawal symptoms)    Self Administer Medications: Behavioral Services        Family History   Problem Relation Age of Onset     Diabetes Mother      Hypertension Mother      No Known Problems Father      No Known Problems Brother      Suicide Other        Allergies   Allergen Reactions     Codeine Unknown     Pt has been told.         Social History  Housing status: with his mother until he gets into a sober house, states he plans to move in to sober house 9/14/21  Employment status: Unemployed, not seeking work  Relationship status: Single broke up with his SO 8/29/21  Children: no children; ex-SO has a child           REVIEW OF SYSTEMS:  C/o very mild withdrawal symptoms of fatigue, slight body aches    OBJECTIVE                                                      There were no vitals taken for this visit.    Physical Exam  HENT:      Head: Normocephalic and atraumatic.   Eyes:      General: No scleral icterus.     Conjunctiva/sclera: Conjunctivae normal.   Pulmonary:      Effort: Pulmonary effort is normal.   Neurological:      Mental Status: He is alert and oriented to person, place, and time.      Coordination: Coordination is intact.      Gait: Gait is intact.   Psychiatric:         Attention and Perception: Attention and perception normal.         Speech: Speech normal.         Behavior: Behavior is cooperative.         Cognition and Memory: Cognition normal.      Comments: Insight and judgement are fair  Mood is neutral, affect is restricted         Labs:    UDS: amphetamines, buprenorphine, methamphetamines, morphine and THC  *POC urine drug screen does not screen for Fentanyl    No results found for this or any previous  visit (from the past 240 hour(s)).        Patient counseling completed today:  Discussed mechanism of action, potential risks/benefits/side effects of medications and other recommendations above.   Discussed risk of precipitated withdrawal with initiation of buprenorphine in the presence of full opioid agonists. Recommend pt keep naloxone in their possession and reviewed other aspects of harm reduction to reduce risk of overdose with return to use.   Recommended avoiding concurrent use of alcohol, benzodiazepines or other sedatives with buprenorphine or other opioids.  Discussed importance of avoiding isolation, building a network of supportive relationships, avoiding people/places/things associated with past use to reduce risk of relapse; including motivational interviewing regarding psychosocial treatment for addiction.     At least 30 min spent in review of medical record,  review, obtaining histories, reviewing symptoms, discussing pt's goals for treatment, counseling noted above.    CLAUDIA CHRISTOPHER MD    [place] [id number] [place] [id number]  [phone]

## 2021-09-20 ENCOUNTER — TELEPHONE (OUTPATIENT)
Dept: BEHAVIORAL HEALTH | Facility: CLINIC | Age: 31
End: 2021-09-20

## 2021-09-20 NOTE — TELEPHONE ENCOUNTER
Writer called patient due to no show today at the Recovery Clinic, left message with clinic phone number and walk in hours

## 2021-09-27 ENCOUNTER — OFFICE VISIT (OUTPATIENT)
Dept: BEHAVIORAL HEALTH | Facility: CLINIC | Age: 31
End: 2021-09-27
Payer: COMMERCIAL

## 2021-09-27 DIAGNOSIS — Z11.3 SCREEN FOR STD (SEXUALLY TRANSMITTED DISEASE): ICD-10-CM

## 2021-09-27 DIAGNOSIS — F17.200 TOBACCO USE DISORDER: ICD-10-CM

## 2021-09-27 DIAGNOSIS — F11.20 OPIOID USE DISORDER, SEVERE, DEPENDENCE (H): Primary | ICD-10-CM

## 2021-09-27 PROCEDURE — 99214 OFFICE O/P EST MOD 30 MIN: CPT | Performed by: FAMILY MEDICINE

## 2021-09-27 PROCEDURE — 87591 N.GONORRHOEAE DNA AMP PROB: CPT | Performed by: FAMILY MEDICINE

## 2021-09-27 PROCEDURE — 87491 CHLMYD TRACH DNA AMP PROBE: CPT | Performed by: FAMILY MEDICINE

## 2021-09-27 RX ORDER — BUPRENORPHINE AND NALOXONE 8; 2 MG/1; MG/1
1 FILM, SOLUBLE BUCCAL; SUBLINGUAL 2 TIMES DAILY
Qty: 20 FILM | Refills: 0 | Status: SHIPPED | OUTPATIENT
Start: 2021-09-27 | End: 2021-10-11

## 2021-09-27 NOTE — PROGRESS NOTES
[facility] - Recovery Clinic Return Visit    ASSESSMENT/PLAN                                                    1. Opioid use disorder, severe, dependence (H)  Pt reporting one episode of use in the last 2 weeks on 9/25/21 after running out of buprenorphine due to missing follow-up appointment.   Resume buprenorphine 16mg/day  Pt confirmed he has multiple doses of naloxone in his possession  Reiterated importance of not using alone  Pt confirmed he is using clean needles for use  MI regarding consistent treatment with buprenorphine  - buprenorphine HCl-naloxone HCl (SUBOXONE) 8-2 MG per film; Place 1 Film under the tongue 2 times daily  Dispense: 20 Film; Refill: 0    2. Tobacco use disorder  Not ready to quit at this time    3. Screen for STD (sexually transmitted disease)  - NEISSERIA GONORRHOEA PCR; Future  - CHLAMYDIA TRACHOMATIS PCR; Future  - NEISSERIA GONORRHOEA PCR  - CHLAMYDIA TRACHOMATIS PCR    Return in about 1 week (around 10/4/2021) for Follow up, with me, in person.      SUBJECTIVE                                                      CC/HPI:  Bebeto Alvarado is a 30 year old male with PMH PSUD primarily opioids on buprenorphine who presents to the Recovery Clinic for return visit.      Brief History:  Pt was first evaluated in Recovery Clinic on 6/11/21.    Opioids: Pt started using opioids 2009 using opioid pills, then progressed to smoking heroin 2011, daily heroin use began 2015,  then IV use starting 2019.     IV drug use: Yes: most recently 8/30/21 - endorses sharing cotton with another person during use  History of overdose: Yes: 2019  Previous treatments : Yes: multiple; most recently admitted to [facility] 8/31/21 and discharged 9/8/21; h/o buprenorphine treatment including XR buprenorphine in 2020  Longest period of sobriety:6-9 months on buprenorphine  Medical complications related to substance use: none known  Hepatitis C Status  8/31/2021 HCV ab nonreactive  HIV Status  8/31/21 HIV ag/ab nonreactive      I last met with pt on 9/14/21  A/P at that time was:  1. Opioid use disorder, severe, dependence (H)  Pt reporting return to use of heroin/fentanyl, recently discharged from +.    Last use 9/13/21.   Reviewed directions for resumption of buprenorphine in the setting of fentanyl use, after waiting at least 24hrs from last use and the presence of at least 3 withdrawal symptoms start with 2mg test dose.  If tolerated, titrate up to 16mg/day.   Recommend pt take 16mg/day given uncontrolled symptoms at 8mg/day.    Refilled naloxone; pt reported he used his supply on another person in the last week x2.   Proceed with supportive housing and psychosocial treatment  - buprenorphine HCl-naloxone HCl (SUBOXONE) 8-2 MG per film; Place 1 Film under the tongue 2 times daily  Dispense: 14 Film; Refill: 0  - naloxone (NARCAN) 4 MG/0.1ML nasal spray; Spray 1 spray (4 mg) into one nostril alternating nostrils once as needed for opioid reversal every 2-3 minutes until assistance arrives  Dispense: 0.2 mL; Refill: 11             Today, pt states he did resume buprenorphine after our visit on 9/14/21 and took 16mg/day for one week.  He did not come for one week f/u due to problems with transportation.  He did find a left over buprenorphine film, and last took 8mg buprenorphine 9/24/21.  He states he found some remaining opioids in his possessions as he was unpacking in his new sober house, and injected heroin/fentanyl on 9/25/21.  He endorses mild withdrawal symptoms today.  He is interested in resuming sublingual buprenorphine.  He is not ready to plan for resuming XR buprenorphine, stating he does not know if he can be sure he will not use for one week.  He states his sober house staff are aware of his use on 9/25/21 and he has not been asked to leave.          [place] Prescription Drug Monitoring Program Reviewed:  Yes; as expected        Past Medical History:   Diagnosis Date     Opiate abuse,  continuous (H)      Tobacco use      PAST PSYCHIATRIC HISTORY:  Diagnoses- depression  Suicide Attempts: No   Hospitalizations: No     No past surgical history on file.    Medications:  buprenorphine HCl-naloxone HCl (SUBOXONE) 8-2 MG per film, Place 1 Film under the tongue 2 times daily (Patient not taking: Reported on 9/27/2021)  buPROPion (WELLBUTRIN XL) 300 MG 24 hr tablet, Take 1 tablet (300 mg) by mouth every morning after completing 150 mg prescription  gabapentin (NEURONTIN) 300 MG capsule, Take 1 capsule (300 mg) by mouth 4 times daily as needed (withdrawal symptoms)  naloxone (NARCAN) 4 MG/0.1ML nasal spray, Spray 1 spray (4 mg) into one nostril alternating nostrils once as needed for opioid reversal every 2-3 minutes until assistance arrives    Self Administer Medications: Behavioral Services        Family History   Problem Relation Age of Onset     Diabetes Mother      Hypertension Mother      No Known Problems Father      No Known Problems Brother      Suicide Other        Allergies   Allergen Reactions     Codeine Unknown     Pt has been told.         Social History  Housing status: in a sober house  Employment status: Unemployed, not seeking work  Relationship status: Single broke up with his SO 8/29/21  Children: no children; ex-SO has a child           REVIEW OF SYSTEMS:  He is interested in repeating gc/chlamydia testing due to recent potential exposure    OBJECTIVE                                                      There were no vitals taken for this visit.   Most recent VS reviewed    Physical Exam  HENT:      Head: Normocephalic and atraumatic.   Eyes:      General: No scleral icterus.     Conjunctiva/sclera: Conjunctivae normal.   Pulmonary:      Effort: Pulmonary effort is normal.   Skin:     Comments: Healing venipuncture marks R antecubital fossae without surrounding erythema/edema   Neurological:      Mental Status: He is alert and oriented to person, place, and time.      Coordination:  Coordination is intact.      Gait: Gait is intact.   Psychiatric:         Attention and Perception: Attention and perception normal.         Speech: Speech normal.         Behavior: Behavior is cooperative.         Cognition and Memory: Cognition normal.      Comments: Insight and judgement are fair  Mood is neutral, affect is restricted         Labs:    UDS: buprenorphine and morphine  *POC urine drug screen does not screen for Fentanyl    No results found for this or any previous visit (from the past 240 hour(s)).        Patient counseling completed today:  Discussed mechanism of action, potential risks/benefits/side effects of medications and other recommendations above.   Discussed risk of precipitated withdrawal with initiation of buprenorphine in the presence of full opioid agonists. Recommend pt keep naloxone in their possession and reviewed other aspects of harm reduction to reduce risk of overdose with return to use.   Recommended avoiding concurrent use of alcohol, benzodiazepines or other sedatives with buprenorphine or other opioids.  Discussed importance of avoiding isolation, building a network of supportive relationships, avoiding people/places/things associated with past use to reduce risk of relapse; including motivational interviewing regarding psychosocial treatment for addiction.     At least 30 min spent in review of medical record,  review, obtaining histories, reviewing symptoms, discussing pt's goals for treatment, counseling noted above.    CLAUDIA CHRISTOPHER MD    [facility]  [address] [place] [id number] [phone]

## 2021-09-27 NOTE — NURSING NOTE
[facility] - Recovery [facility]      Rooming information:  Approximate last use of illicit opioids: 9/25/2021  Taking buprenorphine? No, last took one 3 days ago  Number of buprenorphine films/tablets remaining currently: 0  Narcan currently available: Yes  Other recent substance use:    NICOTINE-Yes:    Denies  If using nicotine, ready to quit? No    Point of care urine drug screen positive for: buprenorphine and morphine  *POC urine drug screen does not screen for Fentanyl      Insurance needs: active    Housing needs: stable    Contact information up to date? yes    3rd Party Involvement none today (please obtain ANAIS if pt would like to include)    Primary care provider: NEEL Tariq CNP     Mental health provider: marylin (follow up on MH referral if needed)    Janet Brice CMA  September 27, 2021  2:36 PM

## 2021-09-28 LAB
C TRACH DNA SPEC QL NAA+PROBE: NEGATIVE
N GONORRHOEA DNA SPEC QL NAA+PROBE: NEGATIVE

## 2021-10-04 ENCOUNTER — TELEPHONE (OUTPATIENT)
Dept: BEHAVIORAL HEALTH | Facility: CLINIC | Age: 31
End: 2021-10-04

## 2021-10-04 NOTE — TELEPHONE ENCOUNTER
Writer called patient due to no show today at [facility], pt states he didn't have a ride and will do a walk in tomorrow

## 2021-10-11 ENCOUNTER — OFFICE VISIT (OUTPATIENT)
Dept: BEHAVIORAL HEALTH | Facility: CLINIC | Age: 31
End: 2021-10-11
Payer: COMMERCIAL

## 2021-10-11 VITALS — SYSTOLIC BLOOD PRESSURE: 138 MMHG | DIASTOLIC BLOOD PRESSURE: 76 MMHG | HEART RATE: 91 BPM

## 2021-10-11 DIAGNOSIS — F19.90 IVDU (INTRAVENOUS DRUG USER): ICD-10-CM

## 2021-10-11 DIAGNOSIS — F33.1 MODERATE EPISODE OF RECURRENT MAJOR DEPRESSIVE DISORDER (H): Primary | ICD-10-CM

## 2021-10-11 DIAGNOSIS — F43.23 ADJUSTMENT DISORDER WITH MIXED ANXIETY AND DEPRESSED MOOD: ICD-10-CM

## 2021-10-11 DIAGNOSIS — F11.20 OPIOID USE DISORDER, SEVERE, DEPENDENCE (H): ICD-10-CM

## 2021-10-11 DIAGNOSIS — F11.20 OPIOID USE DISORDER, SEVERE, DEPENDENCE (H): Primary | ICD-10-CM

## 2021-10-11 DIAGNOSIS — F17.200 TOBACCO USE DISORDER: ICD-10-CM

## 2021-10-11 PROCEDURE — 2894A VOIDCORRECT: CPT

## 2021-10-11 PROCEDURE — 99214 OFFICE O/P EST MOD 30 MIN: CPT | Performed by: FAMILY MEDICINE

## 2021-10-11 PROCEDURE — 90833 PSYTX W PT W E/M 30 MIN: CPT | Performed by: SOCIAL WORKER

## 2021-10-11 RX ORDER — BUPRENORPHINE AND NALOXONE 8; 2 MG/1; MG/1
1 FILM, SOLUBLE BUCCAL; SUBLINGUAL 2 TIMES DAILY
Qty: 20 FILM | Refills: 0 | Status: SHIPPED | OUTPATIENT
Start: 2021-10-11 | End: 2021-10-22

## 2021-10-11 NOTE — PROGRESS NOTES
[facility]  October 11, 2021      Behavioral Health Clinician Progress Note    Patient Name: Bebeto Alvarado           Service Type:  Individual      Service Location:   Face to Face in Clinic     Session Start Time: 1:30pm Session End Time: 1:50pm      Session Length: 16 - 37      Attendees: Patient    Visit Activities (Refresh list every visit): Psychotherapist    Diagnostic Assessment Date: Not completed yet  Treatment Plan Review Date: Not completed yet  See Flowsheets for today's PHQ-9 and WANDER-7 results  Previous PHQ-9:   PHQ-9 SCORE 11/29/2019 12/5/2019 4/10/2020   PHQ-9 Total Score 11 14 7     Previous WANDER-7:   WANDER-7 SCORE 11/29/2019 4/10/2020   Total Score 4 8       JUSTUS LEVEL:  No flowsheet data found.    DATA  Extended Session (60+ minutes): No  Interactive Complexity: No  Crisis: No  [facility] Patient: No    Treatment Objective(s) Addressed in This Session:  Target Behavior(s): mental health and addiction    Depressed Mood: Increase interest, engagement, and pleasure in doing things  Decrease frequency and intensity of feeling down, depressed, hopeless  Identify negative self-talk and behaviors: challenge core beliefs, myths, and actions  Anxiety: will experience a reduction in anxiety and will develop more effective coping skills to manage anxiety symptoms  Alcohol / Substance Use: will discuss/consider potential need for formal substance use evaluation, treatment and/or support  continue to make healthy choices regarding substance use and engage in activities / supportive services that promote sobriety    Current Stressors / Issues:  The client stated that he has been at his sober home for the past 30 days and he is getting off restriction in 2 days so he is looking to find employment-he talked about his recovery plan of connections with his Hindu (going to Judaism and reading the Bible and praying) as this is very important to the client as he is feeling the benefits  of his connections with God emotionally and showing up in his life-in addition he has been attending NA meetings 3 times a week and spending time with others who are in recovery-he talked about his history of depression and anxiety and that currently he has good management of his mental health and that at times his mental health decompensates and that he has some ways to manage his mental health with taking to the others in his sober home and with use of his Scientologist with success-      Progress on Treatment Objective(s) / Homework:  Minimal progress - ACTION (Actively working towards change); Intervened by reinforcing change plan / affirming steps taken    Motivational Interviewing    MI Intervention: Expressed Empathy/Understanding, Supported Autonomy, Collaboration, Evocation, Permission to raise concern or advise, Open-ended questions, Reflections: simple and complex and Change talk (evoked)     Change Talk Expressed by the Patient: Desire to change Ability to change Reasons to change Need to change Committment to change Activation Taking steps    Provider Response to Change Talk: E - Evoked more info from patient about behavior change, A - Affirmed patient's thoughts, decisions, or attempts at behavior change, R - Reflected patient's change talk and S - Summarized patient's change talk statements      Care Plan review completed: No    Medication Review:  No changes to current psychiatric medication(s)    Medication Compliance:  NA    Changes in Health Issues:   None reported    Chemical Use Review:   Substance Use: Chemical use reviewed, no active concerns identified      Tobacco Use: Not reported    Assessment: Current Emotional / Mental Status (status of significant symptoms):  Risk status (Self / Other harm or suicidal ideation)  Patient denies a history of suicidal ideation, suicide attempts, self-injurious behavior, homicidal ideation, homicidal behavior and and other safety concerns  Patient denies current  fears or concerns for personal safety.  Patient denies current or recent suicidal ideation or behaviors.  Patient denies current or recent homicidal ideation or behaviors.  Patient denies current or recent self injurious behavior or ideation.  Patient denies other safety concerns.  A safety and risk management plan has not been developed at this time, however patient was encouraged to call [place] [phone] should there be a change in any of these risk factors.    Appearance:   Appropriate   Eye Contact:   Good   Psychomotor Behavior: Normal   Attitude:   Cooperative   Orientation:   All  Speech   Rate / Production: Normal/ Responsive Normal    Volume:  Normal   Mood:    Normal  Affect:    Appropriate   Thought Content:  Clear   Thought Form:  Coherent  Goal Directed  Logical   Insight:    Fair     Diagnoses:  1. Moderate episode of recurrent major depressive disorder (H)    2. Opioid use disorder, severe, dependence (H)    3. Adjustment disorder with mixed anxiety and depressed mood        Collateral Reports Completed:  Not Applicable    Plan: (Homework, other):  Patient was given information about behavioral services and encouraged to schedule a follow up appointment with the clinic [facility] as needed.  He was also given information about mental health symptoms and treatment options  and strategies to maintain sobriety.  CD Recommendations: Maintain Sobriety.   KAI Che

## 2021-10-11 NOTE — PROGRESS NOTES
PRC met with pt for initial introduction in the Recovery Clinic.   Discussion took place with regards to artemio in recovery and churches and  meetings int he area promoting it together. Pt and PRC have both attended [facility] as well as their dedicated recovery meetings known as [employer].  Pt attends the [facility].  Pt and PRC discussed his relapses and reviewed  Boundaries and tools to utilize when encountered.   Pt currently resides in a sober house which the PRC also resided & managed in [place] known as [facility]. PRC and pt discussed the [employer]. philosophy being dedicated to sobriety and recovery.  PRC provided business card and welcomed communication from pt regarding recovery based supports and resources.      Mayur Payne

## 2021-10-11 NOTE — PROGRESS NOTES
[facility] - Recovery Clinic Return Visit    ASSESSMENT/PLAN                                                    1. Opioid use disorder, severe, dependence (H)  Pt denies heroin/fentanyl use since 9/25/21, states he took hydrocodone from a friend for pain associated with recent dog bite on his leg on 10/8/21.    Continues to take inconsistent doses of buprenorphine with inconsistent follow up.   Encouraged regular use of buprenorphine 16mg/day.   Pt has contact information for infusion center if he chooses to follow recommendation of returning to XR buprenorphine.   - buprenorphine HCl-naloxone HCl (SUBOXONE) 8-2 MG per film; Place 1 Film under the tongue 2 times daily  Dispense: 20 Film; Refill: 0    2. IVDU (intravenous drug user)  Recommended repeat HIV and HCV screening again today due to potential exposures 8/2021, pt again declined but agreed to next visit    3. Tobacco use disorder  Not ready to quit at this tme    Return in about 1 week (around 10/18/2021) for Follow up, in person.      SUBJECTIVE                                                      CC/HPI:  Bebeto Alvarado is a 30 year old male with PMH PSUD primarily opioids on buprenorphine who presents to the Recovery Clinic for return visit.      Brief History:  Pt was first evaluated in Recovery Clinic on 6/11/21.    Opioids: Pt started using opioids 2009 using opioid pills, then progressed to smoking heroin 2011, daily heroin use began 2015,  then IV use starting 2019.     IV drug use: Yes: potential infection exposure with 8/2021 use  History of overdose: Yes: 2019  Previous treatments : Yes: multiple; most recently admitted to [facility] 8/31/21 and discharged 9/8/21; h/o buprenorphine treatment including XR buprenorphine in 2020  Longest period of sobriety:6-9 months on buprenorphine  Medical complications related to substance use: none known  Hepatitis C Status  8/31/2021 HCV ab nonreactive; needs re-screening after potential  exposure 8/2021; pt declined 10/11/21  HIV Status 8/31/21 HIV ag/ab nonreactive;  needs re-screening after potential exposure 8/2021; pt declined 10/11/21      I last met with pt on 9/27/21  A/P at that time was:  1. Opioid use disorder, severe, dependence (H)  Pt reporting one episode of use in the last 2 weeks on 9/25/21 after running out of buprenorphine due to missing follow-up appointment.   Resume buprenorphine 16mg/day  Pt confirmed he has multiple doses of naloxone in his possession  Reiterated importance of not using alone  Pt confirmed he is using clean needles for use  MI regarding consistent treatment with buprenorphine  - buprenorphine HCl-naloxone HCl (SUBOXONE) 8-2 MG per film; Place 1 Film under the tongue 2 times daily  Dispense: 20 Film; Refill: 0    2. Tobacco use disorder  Not ready to quit at this time    3. Screen for STD (sexually transmitted disease)  - NEISSERIA GONORRHOEA PCR; Future  - CHLAMYDIA TRACHOMATIS PCR; Future  - NEISSERIA GONORRHOEA PCR  - CHLAMYDIA TRACHOMATIS PCR         Pt was not present for scheduled follow up on 10/4/21.       Today, pt states he did take buprenorphine 16mg/day as prescribed for about one week after his last visit, then decreased to one film daily or less when he knew he would not be able to return as expected.  He denies any other opioid use since 9/25/21.  He remains in the same sober house, and states he has been attending mutual support groups.          [place] Prescription Drug Monitoring Program Reviewed:  Yes; as expected        Past Medical History:   Diagnosis Date     Opiate abuse, continuous (H)      Tobacco use      PAST PSYCHIATRIC HISTORY:  Diagnoses- depression  Suicide Attempts: No   Hospitalizations: No     No past surgical history on file.    Medications:  Buprenorphine/naloxone 8mg/2mg films, 1/2-2 films daily  buPROPion (WELLBUTRIN XL) 300 MG 24 hr tablet, Take 1 tablet (300 mg) by mouth every morning after completing 150 mg  prescription  naloxone (NARCAN) 4 MG/0.1ML nasal spray, Spray 1 spray (4 mg) into one nostril alternating nostrils once as needed for opioid reversal every 2-3 minutes until assistance arrives (Patient not taking: Reported on 10/11/2021)    Self Administer Medications: Behavioral Services        Family History   Problem Relation Age of Onset     Diabetes Mother      Hypertension Mother      No Known Problems Father      No Known Problems Brother      Suicide Other        Allergies   Allergen Reactions     Codeine Unknown     Pt has been told.         Social History  Housing status: in a sober house  Employment status: Unemployed, seeking work  Relationship status: Single broke up with his SO 8/29/21  Children: no children          REVIEW OF SYSTEMS:  No other concerns    OBJECTIVE                                                      /76   Pulse 91        Physical Exam  HENT:      Head: Normocephalic and atraumatic.   Eyes:      General: No scleral icterus.     Conjunctiva/sclera: Conjunctivae normal.   Pulmonary:      Effort: Pulmonary effort is normal.   Neurological:      Mental Status: He is alert and oriented to person, place, and time.      Coordination: Coordination is intact.      Gait: Gait is intact.   Psychiatric:         Attention and Perception: Attention and perception normal.         Speech: Speech normal.         Behavior: Behavior is cooperative.         Cognition and Memory: Cognition normal.      Comments: Insight and judgement are fair  Mood is neutral, affect is restricted         Labs:    UDS: buprenorphine and morphine (pt states he took hydrocodone given to him by a friend for pain associated with dog bite 10/8/21)  *POC urine drug screen does not screen for Fentanyl    No results found for this or any previous visit (from the past 240 hour(s)).        Patient counseling completed today:  Discussed mechanism of action, potential risks/benefits/side effects of medications and other  recommendations above.   Recommend pt keep naloxone in their possession and reviewed other aspects of harm reduction to reduce risk of overdose with return to use.   Recommended avoiding concurrent use of alcohol, benzodiazepines or other sedatives with buprenorphine or other opioids.  Discussed importance of avoiding isolation, building a network of supportive relationships, avoiding people/places/things associated with past use to reduce risk of relapse; including motivational interviewing regarding psychosocial treatment for addiction.     At least 30 min spent in review of medical record,  review, obtaining histories, reviewing symptoms, discussing pt's goals for treatment, counseling noted above.    CLAUDIA CHRISTOPHER MD    [facility]  [address] [place] [address] [id number] [phone]

## 2021-10-22 ENCOUNTER — OFFICE VISIT (OUTPATIENT)
Dept: BEHAVIORAL HEALTH | Facility: CLINIC | Age: 31
End: 2021-10-22
Payer: COMMERCIAL

## 2021-10-22 VITALS — SYSTOLIC BLOOD PRESSURE: 157 MMHG | HEART RATE: 108 BPM | DIASTOLIC BLOOD PRESSURE: 74 MMHG

## 2021-10-22 DIAGNOSIS — F11.20 OPIOID USE DISORDER, SEVERE, DEPENDENCE (H): Primary | ICD-10-CM

## 2021-10-22 DIAGNOSIS — F33.1 MODERATE EPISODE OF RECURRENT MAJOR DEPRESSIVE DISORDER (H): ICD-10-CM

## 2021-10-22 PROCEDURE — 99214 OFFICE O/P EST MOD 30 MIN: CPT | Performed by: FAMILY MEDICINE

## 2021-10-22 RX ORDER — BUPRENORPHINE AND NALOXONE 8; 2 MG/1; MG/1
1 FILM, SOLUBLE BUCCAL; SUBLINGUAL 2 TIMES DAILY
Qty: 20 FILM | Refills: 0 | Status: SHIPPED | OUTPATIENT
Start: 2021-10-22 | End: 2021-10-29

## 2021-10-22 RX ORDER — BUPROPION HYDROCHLORIDE 300 MG/1
300 TABLET ORAL EVERY MORNING
Qty: 30 TABLET | Refills: 1 | Status: SHIPPED | OUTPATIENT
Start: 2021-10-22 | End: 2021-01-01

## 2021-10-22 NOTE — NURSING NOTE
[facility]      Rooming information:  Approximate last use of illicit opioids: couple days ago  Taking buprenorphine? Yes As prescribed? Yes  Number of buprenorphine films/tablets remaining currently: 0  Side effects related to buprenorphine (constipation, dry mouth, sedation?) No   Narcan currently available: Yes  Other recent substance use:    Denies  NICOTINE-Yes:   If using nicotine, ready to quit? No    Point of care urine drug screen positive for: Negative for everything  *POC urine drug screen does not screen for Fentanyl      PHQ-2 Score:     PHQ-2 ( 1999 Pfizer) 10/11/2021 3/24/2020   Q1: Little interest or pleasure in doing things 1 0   Q2: Feeling down, depressed or hopeless 1 0   PHQ-2 Score 2 0        If PHQ-2 score of 3 or higher, has Recovery Clinic therapist or provider been notified? N/A    Any current suicidal ideation? No  If yes, has Recovery Clinic therapist or provider been notified? N/A    Primary care provider: NEEL Tariq CNP     Mental health provider: denies (follow up on MH referral if needed)    Insurance needs: active    Housing needs: stable    Contact information up to date? yes    3rd Party Involvement none today (please obtain ANAIS if pt would like to include)    Janet Brice CMA  October 22, 2021  2:02 PM

## 2021-10-28 ENCOUNTER — APPOINTMENT (OUTPATIENT)
Dept: BEHAVIORAL HEALTH | Facility: CLINIC | Age: 31
End: 2021-10-28
Payer: COMMERCIAL

## 2021-10-28 ENCOUNTER — OFFICE VISIT (OUTPATIENT)
Dept: BEHAVIORAL HEALTH | Facility: CLINIC | Age: 31
End: 2021-10-28

## 2021-10-28 ENCOUNTER — OFFICE VISIT (OUTPATIENT)
Dept: BEHAVIORAL HEALTH | Facility: CLINIC | Age: 31
End: 2021-10-28
Payer: COMMERCIAL

## 2021-10-28 VITALS — SYSTOLIC BLOOD PRESSURE: 134 MMHG | DIASTOLIC BLOOD PRESSURE: 75 MMHG | HEART RATE: 111 BPM

## 2021-10-28 DIAGNOSIS — F11.20 OPIOID USE DISORDER, SEVERE, DEPENDENCE (H): Primary | ICD-10-CM

## 2021-10-28 LAB
FENTANYL UR QL: ABNORMAL
HCV AB SERPL QL IA: NONREACTIVE
HIV 1+2 AB+HIV1 P24 AG SERPL QL IA: NONREACTIVE
Lab: NORMAL
PERFORMING LABORATORY: NORMAL
TEST NAME: NORMAL

## 2021-10-28 PROCEDURE — 86803 HEPATITIS C AB TEST: CPT | Performed by: FAMILY MEDICINE

## 2021-10-28 PROCEDURE — 36415 COLL VENOUS BLD VENIPUNCTURE: CPT | Performed by: FAMILY MEDICINE

## 2021-10-28 PROCEDURE — 99213 OFFICE O/P EST LOW 20 MIN: CPT | Performed by: FAMILY MEDICINE

## 2021-10-28 PROCEDURE — 80348 DRUG SCREENING BUPRENORPHINE: CPT | Performed by: FAMILY MEDICINE

## 2021-10-28 PROCEDURE — 80307 DRUG TEST PRSMV CHEM ANLYZR: CPT | Performed by: FAMILY MEDICINE

## 2021-10-28 PROCEDURE — 87389 HIV-1 AG W/HIV-1&-2 AB AG IA: CPT | Performed by: FAMILY MEDICINE

## 2021-10-28 NOTE — NURSING NOTE
[facility]      Rooming information:  Approximate last use of illicit opioids: 10/20/2021  Taking buprenorphine? Yes:  As prescribed? Yes:   Number of buprenorphine films/tablets remaining currently: couple  Side effects related to buprenorphine (constipation, dry mouth, sedation?) No   Narcan currently available: Yes  Other recent substance use:    Denies  NICOTINE-Yes:   If using nicotine, ready to quit? No    Point of care urine drug screen positive for: Negative for everything on POC cup  *POC urine drug screen does not screen for Fentanyl      PHQ-2 Score:     PHQ-2 ( 1999 Pfizer) 10/28/2021 10/22/2021   Q1: Little interest or pleasure in doing things 1 1   Q2: Feeling down, depressed or hopeless 0 1   PHQ-2 Score 1 2        If PHQ-2 score of 3 or higher, has Recovery Clinic therapist or provider been notified? N/A    Any current suicidal ideation? No  If yes, has Recovery Clinic therapist or provider been notified? N/A    Primary care provider: NEEL Tariq CNP     Mental health provider: denies (follow up on MH referral if needed)    Insurance needs: active    Housing needs: stable    Contact information up to date? yes    3rd Party Involvement none today (please obtain ANAIS if pt would like to include)    Janet Brice CMA  October 28, 2021  1:52 PM

## 2021-10-28 NOTE — PROGRESS NOTES
Approximate last use of illicit opioids: 10/20/2021  Taking buprenorphine? Yes:  As prescribed? Yes:   Number of buprenorphine films/tablets remaining currently: couple  Side effects related to buprenorphine (constipation, dry mouth, sedation?) No   Narcan currently available: Yes  Other recent substance use:    Denies  NICOTINE-Yes:   If using nicotine, ready to quit? No    Point of care urine drug screen positive for: Negative for everything on POC cup  *POC urine drug screen does not screen for Fentanyl           [facility] - Recovery Clinic Return Visit    ASSESSMENT/PLAN                                                    1. Opioid use disorder, severe, dependence (H)  Pt reporting daily use of buprenorphine, with few episodes of advancing dose to 24mg/day, endorses buprenorphine films remaining from previous rx, and UDS today and last visit negative for buprenorphine.   Blood test for buprenorphine obtained today.   Obtained ID rescreening today due to potential reexposures after screening 8/2021  Recommend pt return to Recovery Clinic 10/29/21 for reevaluation given negative UDS, recommended he utilize existing buprenorphine films in the meantime.   - Fentanyl Urine, Qualitative; Standing  - [facility]; buprenorphine (Laboratory Miscellaneous Order); Future  - Hepatitis C Screen Reflex to HCV RNA Quant and Genotype; Future  - HIV Antigen Antibody Combo; Future  - [facility]; buprenorphine (Laboratory Miscellaneous Order)  - Hepatitis C Screen Reflex to HCV RNA Quant and Genotype  - HIV Antigen Antibody Combo  - Fentanyl Urine, Qualitative  - buprenorphine: [facility] Miscellaneous Test      Return in about 1 week (around 11/4/2021) for Follow up, in person.      SUBJECTIVE                                                      CC/HPI:  Bebeto Alvarado is a 30 year old male with PMH PSUD primarily opioids on buprenorphine who presents to the Recovery Clinic for return visit.      Brief  History:  Pt was first evaluated in Recovery Clinic on 6/11/21.    Opioids: Pt started using opioids 2009 using opioid pills, then progressed to smoking heroin 2011, daily heroin use began 2015,  then IV use starting 2019.     IV drug use: Yes: potential infection exposure with 8/2021 use  History of overdose: Yes: 2019  Previous treatments : Yes: multiple; most recently admitted to [facility] 8/31/21 and discharged 9/8/21; h/o buprenorphine treatment including XR buprenorphine in 2020  Longest period of sobriety:6-9 months on buprenorphine  Medical complications related to substance use: none known  Hepatitis C Status  8/31/2021 HCV ab nonreactive; needs re-screening after potential exposure 8/2021; pt declined 10/11/21  HIV Status 8/31/21 HIV ag/ab nonreactive;  needs re-screening after potential exposure 8/2021; pt declined 10/11/21      Pt was last seen on 10/22/21 by Dr. Melo  A/P at that time was:  ASSESSMENT/PLAN  Diagnoses and all orders for this visit:  Opioid use disorder, severe, dependence (H)  -     buprenorphine HCl-naloxone HCl (SUBOXONE) 8-2 MG per film; Place 1 Film under the tongue 2 times daily  Moderate episode of recurrent major depressive disorder (H)  -     buPROPion (WELLBUTRIN XL) 300 MG 24 hr tablet; Take 1 tablet (300 mg) by mouth every morning after completing 150 mg prescription          Orders Placed This Encounter   Medications     buprenorphine HCl-naloxone HCl (SUBOXONE) 8-2 MG per film       Sig: Place 1 Film under the tongue 2 times daily       Dispense:  20 Film       Refill:  0       NADEAN: [id number]; Please substitute for covered alternative per insurance request.     buPROPion (WELLBUTRIN XL) 300 MG 24 hr tablet       Sig: Take 1 tablet (300 mg) by mouth every morning after completing 150 mg prescription       Dispense:  30 tablet       Refill:  1           - Continue subxone 16mg daily, helping prevent use and significantly reduce cravings  - Prefers walkin  "appointments  - Defers lab testing again today  - Continue wellbutrin, no changes. Disucssed lower dose given occasinal jitters, but he prefers to stay the same       1 week-10d                    Today, pt states he has been taking buprenorphine 16mg/day most days, though on a couple of occasions increased to 24mg/day due to increase in cravings.  He states he last took buprenorphine 10/27/21.  He reports he has \"a couple\" of films remaining at this time.  He denies any other opioid use since 10/20/21 when he took hydrocodone.  He states he remains in the same sober house x2 months, and will be starting work soon.           [place] Prescription Drug Monitoring Program Reviewed:  Yes; as expected        Past Medical History:   Diagnosis Date     Opiate abuse, continuous (H)      Tobacco use      PAST PSYCHIATRIC HISTORY:  Diagnoses- depression  Suicide Attempts: No   Hospitalizations: No     No past surgical history on file.    Medications:  Buprenorphine/naloxone 8mg/2mg films, 1/2-2 films daily  buprenorphine HCl-naloxone HCl (SUBOXONE) 8-2 MG per film, Place 1 Film under the tongue 2 times daily  buPROPion (WELLBUTRIN XL) 300 MG 24 hr tablet, Take 1 tablet (300 mg) by mouth every morning after completing 150 mg prescription  naloxone (NARCAN) 4 MG/0.1ML nasal spray, Spray 1 spray (4 mg) into one nostril alternating nostrils once as needed for opioid reversal every 2-3 minutes until assistance arrives (Patient not taking: Reported on 10/11/2021)    Self Administer Medications: Behavioral Services        Family History   Problem Relation Age of Onset     Diabetes Mother      Hypertension Mother      No Known Problems Father      No Known Problems Brother      Suicide Other        Allergies   Allergen Reactions     Codeine Unknown     Pt has been told.         Social History  Housing status: in a sober house  Employment status: Unemployed, seeking work  Relationship status: Single broke up with his SO " 8/29/21  Children: no children          REVIEW OF SYSTEMS:  No other concerns    OBJECTIVE                                                      /75   Pulse 111        Physical Exam  HENT:      Head: Normocephalic and atraumatic.   Eyes:      General: No scleral icterus.     Conjunctiva/sclera: Conjunctivae normal.   Pulmonary:      Effort: Pulmonary effort is normal.   Neurological:      Mental Status: He is alert and oriented to person, place, and time.      Coordination: Coordination is intact.      Gait: Gait is intact.   Psychiatric:         Attention and Perception: Attention and perception normal.         Speech: Speech normal.         Behavior: Behavior is cooperative.         Cognition and Memory: Cognition normal.      Comments: Insight and judgement are fair  Mood is neutral, affect is restricted         Labs:  UDS 10/28/21: negative for all substances tested (pt reported last dose of buprenorphine 10/27/21)  UDS 10/22/21: negative for all substances tested (pt reported taking hydrocodone 10/2/21, last dose of buprenorphine 10/19/21)  UDS: 10/11/21 buprenorphine and morphine (pt reported taking hydrocodone 10/10/21 and taking buprenorphine)  *POC urine drug screen does not screen for Fentanyl    No results found for this or any previous visit (from the past 240 hour(s)).        Patient counseling completed today:  Discussed mechanism of action, potential risks/benefits/side effects of medications and other recommendations above.   Recommend pt keep naloxone in their possession and reviewed other aspects of harm reduction to reduce risk of overdose with return to use.   Recommended avoiding concurrent use of alcohol, benzodiazepines or other sedatives with buprenorphine or other opioids.  Discussed importance of avoiding isolation, building a network of supportive relationships, avoiding people/places/things associated with past use to reduce risk of relapse; including motivational interviewing  regarding psychosocial treatment for addiction.     At least 20 min spent in review of medical record,  review, obtaining histories, reviewing symptoms, discussing pt's goals for treatment, counseling noted above.    CLAUDIA CHRISTOPHER MD    [place] [id number] [address] [address] [phone]

## 2021-10-29 ENCOUNTER — TELEPHONE (OUTPATIENT)
Dept: BEHAVIORAL HEALTH | Facility: CLINIC | Age: 31
End: 2021-10-29

## 2021-10-29 ENCOUNTER — OFFICE VISIT (OUTPATIENT)
Dept: BEHAVIORAL HEALTH | Facility: CLINIC | Age: 31
End: 2021-10-29
Payer: COMMERCIAL

## 2021-10-29 DIAGNOSIS — F11.20 OPIOID USE DISORDER, SEVERE, DEPENDENCE (H): ICD-10-CM

## 2021-10-29 DIAGNOSIS — F43.23 ADJUSTMENT DISORDER WITH MIXED ANXIETY AND DEPRESSED MOOD: Primary | ICD-10-CM

## 2021-10-29 PROCEDURE — 90833 PSYTX W PT W E/M 30 MIN: CPT | Performed by: SOCIAL WORKER

## 2021-10-29 PROCEDURE — 99214 OFFICE O/P EST MOD 30 MIN: CPT | Performed by: FAMILY MEDICINE

## 2021-10-29 RX ORDER — BUPRENORPHINE AND NALOXONE 12; 3 MG/1; MG/1
1 FILM, SOLUBLE BUCCAL; SUBLINGUAL EVERY MORNING
Qty: 15 FILM | Refills: 0 | Status: SHIPPED | OUTPATIENT
Start: 2021-10-29 | End: 2021-01-01

## 2021-10-29 RX ORDER — BUPRENORPHINE AND NALOXONE 8; 2 MG/1; MG/1
1 FILM, SOLUBLE BUCCAL; SUBLINGUAL EVERY EVENING
Qty: 15 FILM | Refills: 0 | Status: SHIPPED | OUTPATIENT
Start: 2021-10-29 | End: 2021-01-01

## 2021-10-29 NOTE — PROGRESS NOTES
[facility] Return Visit    ASSESSMENT/PLAN                                                    1. Opioid use disorder, severe, dependence (H)  Pt reporting continued use of other opioids on 16mg/day  Increase buprenorphine to 20mg/day  - buprenorphine HCl-naloxone HCl (SUBOXONE) 8-2 MG per film; Place 1 Film under the tongue every evening  Dispense: 15 Film; Refill: 0  - Buprenorphine HCl-Naloxone HCl (SUBOXONE) 12-3 MG FILM per film; Place 1 Film under the tongue every morning  Dispense: 15 Film; Refill: 0        Return in about 2 weeks (around 11/12/2021) for Follow up, in person.      SUBJECTIVE                                                      CC/HPI:  Bebeto Alvarado is a 30 year old male with PMH PSUD primarily opioids on buprenorphine who presents to the Recovery Clinic for return visit.      Brief History:  Pt was first evaluated in Recovery Clinic on 6/11/21.    Opioids: Pt started using opioids 2009 using opioid pills, then progressed to smoking heroin 2011, daily heroin use began 2015,  then IV use starting 2019.     IV drug use: Yes: potential infection exposure with 8/2021 use  History of overdose: Yes: 2019  Previous treatments : Yes: multiple; most recently admitted to [facility] 8/31/21 and discharged 9/8/21; h/o buprenorphine treatment including XR buprenorphine in 2020  Longest period of sobriety:6-9 months on buprenorphine  Medical complications related to substance use: none known  Hepatitis C Status 10/28/2021 HCV ab nonreactive  HIV Status 10/28/21 HIV ag/ab nonreactive      Pt was last seen on 10/28/21   A/P at that time was:  1. Opioid use disorder, severe, dependence (H)  Pt reporting daily use of buprenorphine, with few episodes of advancing dose to 24mg/day, endorses buprenorphine films remaining from previous rx, and UDS today and last visit negative for buprenorphine.   Blood test for buprenorphine obtained today.   Obtained ID rescreening today  "due to potential reexposures after screening 8/2021  Recommend pt return to Recovery Clinic 10/29/21 for reevaluation given negative UDS, recommended he utilize existing buprenorphine films in the meantime.   - Fentanyl Urine, Qualitative; Standing  - [facility]; buprenorphine (Laboratory Miscellaneous Order); Future  - Hepatitis C Screen Reflex to HCV RNA Quant and Genotype; Future  - HIV Antigen Antibody Combo; Future  - [facility]; buprenorphine (Laboratory Miscellaneous Order)  - Hepatitis C Screen Reflex to HCV RNA Quant and Genotype  - HIV Antigen Antibody Combo  - Fentanyl Urine, Qualitative  - buprenorphine: [facility] Miscellaneous Test       Today, pt states he did not take any of the remaining buprenorphine he had at home on 10/28/21 after our last visit, but did take \"2 Vicodin\" on 10/28/21 instead.  He continues to state last dose of buprenorphine was 10/27/21.  He continues to deny any heike/fentanyl use since 9/25/21.    [place] Prescription Drug Monitoring Program Reviewed:  Yes; as expected        Past Medical History:   Diagnosis Date     Opiate abuse, continuous (H)      Tobacco use      PAST PSYCHIATRIC HISTORY:  Diagnoses- depression  Suicide Attempts: No   Hospitalizations: No     No past surgical history on file.    Medications:  Buprenorphine/naloxone 8mg/2mg films, 1/2-2 films daily  buPROPion (WELLBUTRIN XL) 300 MG 24 hr tablet, Take 1 tablet (300 mg) by mouth every morning after completing 150 mg prescription  naloxone (NARCAN) 4 MG/0.1ML nasal spray, Spray 1 spray (4 mg) into one nostril alternating nostrils once as needed for opioid reversal every 2-3 minutes until assistance arrives (Patient not taking: Reported on 10/11/2021)    Self Administer Medications: Behavioral Services        Family History   Problem Relation Age of Onset     Diabetes Mother      Hypertension Mother      No Known Problems Father      No Known Problems Brother      Suicide Other        Allergies "   Allergen Reactions     Codeine Unknown     Pt has been told.         Social History  Housing status: in a sober house  Employment status: Unemployed, seeking work  Relationship status: Single broke up with his SO 8/29/21  Children: no children          REVIEW OF SYSTEMS:  No other concerns    OBJECTIVE                                                      There were no vitals taken for this visit.       Physical Exam  HENT:      Head: Normocephalic and atraumatic.   Eyes:      General: No scleral icterus.     Conjunctiva/sclera: Conjunctivae normal.   Pulmonary:      Effort: Pulmonary effort is normal.   Neurological:      Mental Status: He is alert and oriented to person, place, and time.      Coordination: Coordination is intact.      Gait: Gait is intact.   Psychiatric:         Attention and Perception: Attention and perception normal.         Speech: Speech normal.         Behavior: Behavior is cooperative.         Cognition and Memory: Cognition normal.      Comments: Insight and judgement are fair  Mood is neutral, affect is restricted         Labs:  UDS 10/29/21: +buprenorphine, +opiates (pt reports use of hydrocodone 10/28, last use f buprenorphine 10/27/21)  UDS 10/28/21: negative for all substances tested (pt reported last dose of buprenorphine 10/27/21)  UDS 10/22/21: negative for all substances tested (pt reported taking hydrocodone 10/2/21, last dose of buprenorphine 10/19/21)  UDS: 10/11/21 buprenorphine and morphine (pt reported taking hydrocodone 10/10/21 and taking buprenorphine)  *[facility] urine drug screen does not screen for Fentanyl    Recent Results (from the past 240 hour(s))   Hepatitis C Screen Reflex to HCV RNA Quant and Genotype    Collection Time: 10/28/21  2:41 PM   Result Value Ref Range    Hepatitis C Antibody Nonreactive Nonreactive   HIV Antigen Antibody Combo    Collection Time: 10/28/21  2:41 PM   Result Value Ref Range    HIV Antigen Antibody Combo Nonreactive Nonreactive   [facility]; buprenorphine (Laboratory Miscellaneous Order)    Collection Time: 10/28/21  4:06 PM   Result Value Ref Range    Performing Laboratory [facility]     Test Name Buprenorphine and Metabolites, Urine, Quantitative     Test Code [id number]    Fentanyl Urine, Qualitative    Collection Time: 10/28/21  4:06 PM   Result Value Ref Range    Fentanyl Qual Urine Screen Positive (A) Screen Negative           Patient counseling completed today:  Discussed mechanism of action, potential risks/benefits/side effects of medications and other recommendations above.   Recommend pt keep naloxone in their possession and reviewed other aspects of harm reduction to reduce risk of overdose with return to use.   Recommended avoiding concurrent use of alcohol, benzodiazepines or other sedatives with buprenorphine or other opioids.  Discussed importance of avoiding isolation, building a network of supportive relationships, avoiding people/places/things associated with past use to reduce risk of relapse; including motivational interviewing regarding psychosocial treatment for addiction.     At least 30 min spent in review of medical record,  review, obtaining histories, reviewing symptoms, discussing pt's goals for treatment, counseling noted above.    CLAUDIA CHRISTOPHER MD    [place] [id number] [address] [phone]

## 2021-10-29 NOTE — NURSING NOTE
[facility]      Rooming information:  Approximate last use of illicit opioids: 10/28/2021  Taking buprenorphine? Yes:  As prescribed? Yes:   Number of buprenorphine films/tablets remaining currently: 1 and 1/2  Side effects related to buprenorphine (constipation, dry mouth, sedation?) No   Narcan currently available: Yes  Other recent substance use:    Denies  NICOTINE-Yes:   If using nicotine, ready to quit? No    Point of care urine drug screen positive for: buprenorphine and morphine  *POC urine drug screen does not screen for Fentanyl      PHQ-2 Score:     PHQ-2 ( 1999 Pf[employer]) 10/28/2021 10/22/2021   Q1: Little interest or pleasure in doing things 1 1   Q2: Feeling down, depressed or hopeless 0 1   PHQ-2 Score 1 2        If PHQ-2 score of 3 or higher, has Recovery Clinic therapist or provider been notified? N/A    Any current suicidal ideation? No  If yes, has Recovery Clinic therapist or provider been notified? N/A    Primary care provider: NEEL Tariq CNP     Mental health provider: stable (follow up on MH referral if needed)    Insurance needs: active    Housing needs: stable    Contact information up to date? yes    3rd Party Involvement none today (please obtain ANAIS if pt would like to include)    Janet Brice CMA  October 29, 2021  10:36 AM

## 2021-10-29 NOTE — TELEPHONE ENCOUNTER
Attempted to reach pt to discuss +fentanyl from UDS 10/28.  msg stated person unable to accept calls.  Attempted to call pt's mother listed as contact, no answer, left msg requesting call back if alternative # for pt is available.

## 2021-10-29 NOTE — PROGRESS NOTES
[facility]  October 29, 2021      Behavioral Health Clinician Progress Note    Patient Name: Bebeto Alvarado           Service Type:  Individual      Service Location:   Face to Face in Clinic     Session Start Time: 11:00am  Session End Time: 11:20am      Session Length: 16 - 37      Attendees: Patient    Visit Activities (Refresh list every visit): Psychotherapist    Diagnostic Assessment Date: Not completed yet  Treatment Plan Review Date: Not completed yet  See Flowsheets for today's PHQ-9 and WANDER-7 results  Previous PHQ-9:   PHQ-9 SCORE 11/29/2019 12/5/2019 4/10/2020   PHQ-9 Total Score 11 14 7     Previous WANDER-7:   WANDER-7 SCORE 11/29/2019 4/10/2020   Total Score 4 8       JUSTUS LEVEL:  No flowsheet data found.    DATA  Extended Session (60+ minutes): No  Interactive Complexity: No  Crisis: No  [facility] Patient: No    Treatment Objective(s) Addressed in This Session:  Target Behavior(s): mental health and addiction    Depressed Mood: Increase interest, engagement, and pleasure in doing things  Decrease frequency and intensity of feeling down, depressed, hopeless  Anxiety: will experience a reduction in anxiety and will develop more effective coping skills to manage anxiety symptoms  Alcohol / Substance Use: will discuss/consider potential need for formal substance use evaluation, treatment and/or support  continue to make healthy choices regarding substance use and engage in activities / supportive services that promote sobriety    Current Stressors / Issues:  11:am to 11:20    The patient stated that he reflects on his former use and this motivates him to stay sober as he wants to have a good life and good relationships with others-he stated that he has harmed some relationships from the past and this writer talked with the client about his need to just continue showing up in life sober and the relationships and the trust will return-he stated that he is planning to start working  2 part time jobs and he is excited about having some more meaningful activities for him on a regular basis-he is having more balanced mood state and good management of his anxiety symptoms-he continues to be sober of chemical use and he stated that he has been active with attending NA meetings during the week-he is staying in a sober home that requires him to attend 3 meetings a week and he has been doing at least 3 meetings a week-during this visit the patient was able to maintain change talk and speak about his actions of staying sober.        Progress on Treatment Objective(s) / Homework:  Minimal progress - ACTION (Actively working towards change); Intervened by reinforcing change plan / affirming steps taken    Motivational Interviewing    MI Intervention: Expressed Empathy/Understanding, Supported Autonomy, Collaboration, Evocation, Permission to raise concern or advise, Open-ended questions, Reflections: simple and complex and Change talk (evoked)     Change Talk Expressed by the Patient: Desire to change Ability to change Reasons to change Need to change Committment to change Activation Taking steps    Provider Response to Change Talk: E - Evoked more info from patient about behavior change, A - Affirmed patient's thoughts, decisions, or attempts at behavior change, R - Reflected patient's change talk and S - Summarized patient's change talk statements      Care Plan review completed: No    Medication Review:  No changes to current psychiatric medication(s)    Medication Compliance:  NA    Changes in Health Issues:   None reported    Chemical Use Review:   Substance Use: Chemical use reviewed, no active concerns identified      Tobacco Use: Not reported    Assessment: Current Emotional / Mental Status (status of significant symptoms):  Risk status (Self / Other harm or suicidal ideation)  Patient denies a history of suicidal ideation, suicide attempts, self-injurious behavior, homicidal ideation, homicidal  behavior and and other safety concerns  Patient denies current fears or concerns for personal safety.  Patient denies current or recent suicidal ideation or behaviors.  Patient denies current or recent homicidal ideation or behaviors.  Patient denies current or recent self injurious behavior or ideation.  Patient denies other safety concerns.  A safety and risk management plan has not been developed at this time, however patient was encouraged to call [place] [id number] should there be a change in any of these risk factors.    Appearance:   Appropriate   Eye Contact:   Good   Psychomotor Behavior: Normal   Attitude:   Cooperative   Orientation:   All  Speech   Rate / Production: Normal/ Responsive Normal    Volume:  Normal   Mood:    Normal  Affect:    Appropriate   Thought Content:  Clear   Thought Form:  Coherent  Goal Directed  Logical   Insight:    Good     Diagnoses:  1. Adjustment disorder with mixed anxiety and depressed mood    2. Opioid use disorder, severe, dependence (H)        Collateral Reports Completed:  Not Applicable    Plan: (Homework, other):  Patient was given information about behavioral services and encouraged to schedule a follow up appointment with the clinic [facility] as needed.  He was also given information about mental health symptoms and treatment options  and strategies to maintain sobriety.  CD Recommendations: Maintain Sobriety.   KAI Che

## 2021-11-18 NOTE — NURSING NOTE
[facility]      Rooming information:  Approximate last use of illicit opioids: 11/17/2021  Taking buprenorphine? No   Narcan currently available: Yes  Other recent substance use:    Denies  NICOTINE-Yes:   If using nicotine, ready to quit? No    Point of care urine drug screen positive for: morphine  *POC urine drug screen does not screen for Fentanyl      PHQ-2 Score:     PHQ-2 ( 1999 Pfizer) 10/28/2021 10/22/2021   Q1: Little interest or pleasure in doing things 1 1   Q2: Feeling down, depressed or hopeless 0 1   PHQ-2 Score 1 2   PHQ-2 Total Score (12-17 Years)- Positive if 3 or more points; Administer PHQ-A if positive 1 2        If PHQ-2 score of 3 or higher, has Recovery Clinic therapist or provider been notified? N/A    Any current suicidal ideation? No  If yes, has Recovery Clinic therapist or provider been notified? N/A    Primary care provider: NEEL Tariq CNP     Mental health provider: denies (follow up on MH referral if needed)    Insurance needs: active    Housing needs: stable    Contact information up to date? yes    3rd Party Involvement none today (please obtain ANAIS if pt would like to include)    Janet Brice CMA  November 18, 2021  11:50 AM

## 2021-11-18 NOTE — PROGRESS NOTES
PRC met with Pt in the Recovery Clinic to discuss recovery status. Pt appeared alert oriented and open to feedback during our meeting.     Pt states he had a recent re-use but has regained focus to seek sobriety with the use of suboxone.  Pt states learning some valuable lessons in the process which will allow better handling and choices in the situation should it occur again.  Pt states he remains with [facility] but has switched to a new house due to issues with residents at the prior dwelling.  Pt feels there might be some use occurring with residents at this new house.  PRC and pt discussed handling the negative emotions that  can result from re-use, and commended him on not continuing to use and possibly burning bridges necessary to maintain his current recovery.  PRC and pt dicussed tools to handle living and social environments where use may occur, stressing the focus and dedication is on our recovery not what others are doing or not doing in their recoveries.  Encouragement to develop a sober tree network to contact for support, accountability and resources was provided to pt. Attending recovery meetings and finding a sponsor was encouraged.      PRC welcomes contact from the pt for support and resources as needed.  Agreement is to speak again at upcoming  appointment.     Mayur Payne on 11/18/2021 at 4:09 PM

## 2021-11-18 NOTE — PROGRESS NOTES
[facility] Follow Up    ASSESSMENT/PLAN                                                      1. Opioid use disorder, severe, dependence (H)  Based on review of chart and discussion with patient, he continues to struggle with consistent follow-up.  He reports no cravings or use of opioids while on buprenorphine 20mg/day but ran out 5-6 days ago (based on last fill, last dose would be 11/13 or 11/14 so I do wonder if ran out early given UDS negative for buprenorphine).  Return to heroin use yesterday.  He would like to continue on current dose of Suboxone (20mg/day).  Reviewed re-induction in setting of recent use and risk of precipitated withdrawal which he is familiar with.  We discussed option of returning to XR buprenorphine to avoid risk of running out etc - he is not ready to move forward with this at this time.  Continue close follow-up - I have asked him to return in 7-10 days but provided 15 days of medication to ensure he returns while he is taking.  He did not schedule a follow-up visit on his way out - states work schedule is difficult to predict so hard to schedule and he prefers walk in.    - buprenorphine HCl-naloxone HCl (SUBOXONE) 8-2 MG per film; Place 1 Film under the tongue every evening  Dispense: 15 Film; Refill: 0  - Buprenorphine HCl-Naloxone HCl (SUBOXONE) 12-3 MG FILM per film; Place 1 Film under the tongue every morning  Dispense: 15 Film; Refill: 0      SUBJECTIVE                                                          CC/HPI:  Bebeto Alvarado is a 31 year old male with PMHx PSUD (primarily opioids) who presents to the Recovery Clinic for follow up regarding treatment of opioid use disorder.       First Recovery Clinic visit: 6/11/21    Last date of illicit opioid use: 11/17/21    Opioids: Pt started using opioids 2009 using opioid pills, then progressed to smoking heroin 2011, daily heroin use began 2015,  then IV use starting 2019.       IV drug use: Yes:  potential infection exposure with 8/2021 use  History of overdose: Yes: 2019  Previous treatments : Yes: multiple; most recently admitted to [facility] 8/31/21 and discharged 9/8/21; h/o buprenorphine treatment including XR buprenorphine in 2020  Longest period of sobriety:6-9 months on buprenorphine  Medical complications related to substance use: none known  Hepatitis C Status 10/28/2021 HCV ab nonreactive  HIV Status 10/28/21 HIV ag/ab nonreactive      Most recent Recovery Clinic visit 10/29.    Important points and recommendations last visit:  1. Opioid use disorder, severe, dependence (H)  Pt reporting continued use of other opioids on 16mg/day  Increase buprenorphine to 20mg/day  - buprenorphine HCl-naloxone HCl (SUBOXONE) 8-2 MG per film; Place 1 Film under the tongue every evening  Dispense: 15 Film; Refill: 0  - Buprenorphine HCl-Naloxone HCl (SUBOXONE) 12-3 MG FILM per film; Place 1 Film under the tongue every morning  Dispense: 15 Film; Refill: 0    Today, pt states he ran out of Suboxone about 5-6 days ago.  No use of opioids while taking Suboxone.  Was having occasional, manageable cravings at buprenorphine 20mg/daily.  No side effects of buprenorphine.  Heroin used yesterday x 1 (early morning).  Feeling withdrawal symptoms today, anxious to get back on Suboxone.    Has cut down tobacco use to 1/2 ppd (was at 1ppd).  Has patches at home.      [place] Prescription Drug Monitoring Program Reviewed:  Yes; as expected      Medications:  Buprenorphine HCl-Naloxone HCl (SUBOXONE) 12-3 MG FILM per film, Place 1 Film under the tongue every morning  buprenorphine HCl-naloxone HCl (SUBOXONE) 8-2 MG per film, Place 1 Film under the tongue every evening  buPROPion (WELLBUTRIN XL) 300 MG 24 hr tablet, Take 1 tablet (300 mg) by mouth every morning after completing 150 mg prescription  naloxone (NARCAN) 4 MG/0.1ML nasal spray, Spray 1 spray (4 mg) into one nostril alternating nostrils once as needed for opioid  reversal every 2-3 minutes until assistance arrives    Self Administer Medications: Behavioral Services        Allergies   Allergen Reactions     Codeine Unknown     Pt has been told.       PMH, PSH, FamHx reviewed    Social History  Housing status: in a sober house  Employment status: Unemployed, seeking work  Relationship status: Single broke up with his SO 8/29/21  Children: no children      OBJECTIVE                                                      BP (!) 163/75   Pulse 102     Physical Exam  Constitutional:       Appearance: Normal appearance. He is diaphoretic.   Eyes:      General: No scleral icterus.     Conjunctiva/sclera: Conjunctivae normal.   Pulmonary:      Effort: Pulmonary effort is normal. No respiratory distress.   Neurological:      General: No focal deficit present.      Mental Status: He is alert and oriented to person, place, and time.   Psychiatric:         Mood and Affect: Mood normal.         Behavior: Behavior normal.         Thought Content: Thought content normal.         Judgment: Judgment normal.         Labs:    UDS: morphine  *[facility] urine drug screen does not screen for Fentanyl    No results found for this or any previous visit (from the past 240 hour(s)).      Patient counseling completed today:  Discussed mechanism of action, potential risks/benefits/side effects of medications and other recommendations above.  Recommend pt keep naloxone in their possession and reviewed other aspects of harm reduction to reduce risk of overdose with return to use.   Recommended avoiding concurrent use of alcohol, benzodiazepines or other sedatives with buprenorphine or other opioids.  Discussed importance of avoiding isolation, building a network of supportive relationships, avoiding people/places/things associated with past use to reduce risk of relapse; including motivational interviewing regarding psychosocial treatment for addiction.       Jenn Merritt, [facility]  [facility]  [address] [place] [id number]  [phone]

## 2021-12-06 NOTE — NURSING NOTE
[facility]      Rooming information:  Approximate last use of illicit opioids: 12/4/2021  Taking buprenorphine? No As prescribed? No  Number of buprenorphine films/tablets remaining currently: 0  Narcan currently available: Yes  Other recent substance use:    Cannabis   NICOTINE-Yes:   If using nicotine, ready to quit? No    Point of care urine drug screen positive for: cocaine and morphine  *POC urine drug screen does not screen for Fentanyl      PHQ-2 Score:     PHQ-2 ( 1999 Pfizer) 12/6/2021 11/18/2021   Q1: Little interest or pleasure in doing things 1 1   Q2: Feeling down, depressed or hopeless 1 0   PHQ-2 Score 2 1   PHQ-2 Total Score (12-17 Years)- Positive if 3 or more points; Administer PHQ-A if positive 2 1        If PHQ-2 score of 3 or higher, has Recovery Clinic therapist or provider been notified? N/A    Any current suicidal ideation? No  If yes, has [facility] Clinic therapist or provider been notified? N/A    Primary care provider: NEEL Tariq CNP     Mental health provider: denies (follow up on MH referral if needed)    Insurance needs: active    Housing needs: stable    Contact information up to date? yes    3rd Party Involvement none today (please obtain ANAIS if pt would like to include)    Janet Brice CMA  December 6, 2021  2:09 PM

## 2021-12-06 NOTE — PROGRESS NOTES
Rooming information:  Approximate last use of illicit opioids: 12/4/2021  Taking buprenorphine? No As prescribed? No  Number of buprenorphine films/tablets remaining currently: 0  Narcan currently available: Yes  Other recent substance use:    Cannabis   NICOTINE-Yes:   If using nicotine, ready to quit? No    Point of care urine drug screen positive for: cocaine and morphine  *POC urine drug screen does not screen for Fentanyl             [facility] Return Visit    ASSESSMENT/PLAN                                                      1. Opioid use disorder, severe, dependence (H)  Pt endorsing ongoing intermittent use of full opioid agonists when not taking buprenorphine, having difficulty maintaining consistent buprenorphine therapy and clinic follow up.    Recommended again transfer to XR buprenorphine.  Pt declines at this time.   He does have reduced use of full opioids with buprenorphine.    Reviewed aspects of harm reduction including naloxone which pt confirms he has   - Buprenorphine HCl-Naloxone HCl (SUBOXONE) 12-3 MG FILM per film; Place 1 Film under the tongue every morning  Dispense: 15 Film; Refill: 0  - buprenorphine HCl-naloxone HCl (SUBOXONE) 8-2 MG per film; Place 1 Film under the tongue every evening  Dispense: 15 Film; Refill: 0    2. Moderate episode of recurrent major depressive disorder (H)  Refilled bupropion  - buPROPion (WELLBUTRIN XL) 300 MG 24 hr tablet; Take 1 tablet (300 mg) by mouth every morning  Dispense: 30 tablet; Refill: 1        Return in about 2 weeks (around 12/20/2021) for Follow up, in person.      SUBJECTIVE                                                      CC/HPI:  Bebeto Alvarado is a 31 year old male with PMH PSUD primarily opioids on buprenorphine who presents to the Recovery Clinic for return visit.      Brief History:  Pt was first evaluated in Recovery Clinic on 6/11/21.    Opioids: Pt started using opioids 2009 using opioid pills,  then progressed to smoking heroin 2011, daily heroin use began 2015,  then IV use starting 2019.     IV drug use: Yes: potential infection exposure with 8/2021 use  History of overdose: Yes: 2019  Previous treatments : Yes: multiple; most recently admitted to [facility] 8/31/21 and discharged 9/8/21; h/o buprenorphine treatment including XR buprenorphine in 2020  Longest period of sobriety:6-9 months on buprenorphine  Medical complications related to substance use: none known  Hepatitis C Status 10/28/2021 HCV ab nonreactive  HIV Status 10/28/21 HIV ag/ab nonreactive      Pt was last seen on 11/18/21   A/P at that time was:  1. Opioid use disorder, severe, dependence (H)  Based on review of chart and discussion with patient, he continues to struggle with consistent follow-up.  He reports no cravings or use of opioids while on buprenorphine 20mg/day but ran out 5-6 days ago (based on last fill, last dose would be 11/13 or 11/14 so I do wonder if ran out early given UDS negative for buprenorphine).  Return to heroin use yesterday.  He would like to continue on current dose of Suboxone (20mg/day).  Reviewed re-induction in setting of recent use and risk of precipitated withdrawal which he is familiar with.  We discussed option of returning to XR buprenorphine to avoid risk of running out etc - he is not ready to move forward with this at this time.  Continue close follow-up - I have asked him to return in 7-10 days but provided 15 days of medication to ensure he returns while he is taking.  He did not schedule a follow-up visit on his way out - states work schedule is difficult to predict so hard to schedule and he prefers walk in.    - buprenorphine HCl-naloxone HCl (SUBOXONE) 8-2 MG per film; Place 1 Film under the tongue every evening  Dispense: 15 Film; Refill: 0  - Buprenorphine HCl-Naloxone HCl (SUBOXONE) 12-3 MG FILM per film; Place 1 Film under the tongue every morning  Dispense: 15 Film; Refill:  0          Today, pt states he took buprenorphine as prescribed until his medication ran out.  He was unable to get away from work last week for his scheduled visit.  He states he did use full opioids on 12/4/21.  He does not report any other substance use besides nicotine.      [place] Prescription Drug Monitoring Program Reviewed:  Yes; as expected        Past Medical History:   Diagnosis Date     Opiate abuse, continuous (H)      Tobacco use      PAST PSYCHIATRIC HISTORY:  Diagnoses- depression  Suicide Attempts: No   Hospitalizations: No     No past surgical history on file.    Medications:  Buprenorphine/naloxone 8mg/2mg films, 1/2-2 films daily  Buprenorphine HCl-Naloxone HCl (SUBOXONE) 12-3 MG FILM per film, Place 1 Film under the tongue every morning  buprenorphine HCl-naloxone HCl (SUBOXONE) 8-2 MG per film, Place 1 Film under the tongue every evening  buPROPion (WELLBUTRIN XL) 300 MG 24 hr tablet, Take 1 tablet (300 mg) by mouth every morning after completing 150 mg prescription  naloxone (NARCAN) 4 MG/0.1ML nasal spray, Spray 1 spray (4 mg) into one nostril alternating nostrils once as needed for opioid reversal every 2-3 minutes until assistance arrives    Self Administer Medications: Behavioral Services        Family History   Problem Relation Age of Onset     Diabetes Mother      Hypertension Mother      No Known Problems Father      No Known Problems Brother      Suicide Other        Allergies   Allergen Reactions     Codeine Unknown     Pt has been told.         Social History  Housing status: in a sober house  Employment status: Unemployed, seeking work  Relationship status: Single broke up with his SO 8/29/21  Children: no children          REVIEW OF SYSTEMS:  No other concerns    OBJECTIVE                                                      /79   Pulse 91        Physical Exam  HENT:      Head: Normocephalic and atraumatic.   Eyes:      General: No scleral icterus.     Conjunctiva/sclera:  Conjunctivae normal.   Pulmonary:      Effort: Pulmonary effort is normal.   Neurological:      Mental Status: He is alert and oriented to person, place, and time.      Coordination: Coordination is intact.      Gait: Gait is intact.   Psychiatric:         Attention and Perception: Attention and perception normal.         Speech: Speech normal.         Behavior: Behavior is cooperative.         Cognition and Memory: Cognition normal.      Comments: Insight and judgement are fair  Mood is neutral, affect is restricted         Labs:    UDS: cocaine and morphine   *POC urine drug screen does not screen for Fentanyl    No results found for this or any previous visit (from the past 240 hour(s)).        Patient counseling completed today:  Discussed mechanism of action, potential risks/benefits/side effects of medications and other recommendations above.   Recommend pt keep naloxone in their possession and reviewed other aspects of harm reduction to reduce risk of overdose with return to use.   Recommended avoiding concurrent use of alcohol, benzodiazepines or other sedatives with buprenorphine or other opioids.  Discussed importance of avoiding isolation, building a network of supportive relationships, avoiding people/places/things associated with past use to reduce risk of relapse; including motivational interviewing regarding psychosocial treatment for addiction.     At least 30 min spent in review of medical record,  review, obtaining histories, reviewing symptoms, discussing pt's goals for treatment, counseling noted above.    CLAUDIA CHRISTOPHRE MD    [place] [id number] [place] [id number] [phone]

## 2021-12-06 NOTE — PROGRESS NOTES
PRC met with Pt in the Recovery Clinic to discuss recovery status. Pt appeared alert oriented and open to feedback during our meeting.      Pt states being focused on recovery yet admits to recent use given inability to get to the [facility] for suboxone.  PRC commended pt for not going on an extended run, and discussed the accountability factor of getting to the RC for timely refills. PRC and pt discussed handling the negative emotions that  can result from re-use, and commended him on not continuing to use and possibly burning bridges necessary to maintain his current recovery.  PRC and pt discussed accountability n getting to three recovery meetings per week - as requited by his sober house and as a tool for sober networking. Encouragement in developing a sober tree network and finding a sponsor for support, accountability and resources was provided to pt.  PRC welcomes contact from the pt for support and resources as needed.  Agreement is to speak again at upcoming RC appointment.     Mayur Payne on 12/6/2021 at 2:27 PM

## 2021-12-22 NOTE — PROGRESS NOTES
[facility] Return Visit    ASSESSMENT/PLAN                                                    1. Opioid use disorder, severe, dependence (H)  Pt reporting continued intermittent use of heroin/fentanyl IV, using alone when he uses.  Describes advancing buprenorphine dose to 24mg/day during time he had buprenorphine due to level of cravings.   Increase buprenorphine to 24mg/day  Recommend transfer to XR buprenorphine, pt continues to decline this.   Recommend he schedule intake at [facility] to facilitate transfer to methadone if he continues to have uncontrolled symptoms with buprenorphine 24mg/day.    Reiterated importance of not using alone, having naloxone available due to risk of opioid overdose and death  - Buprenorphine HCl-Naloxone HCl (SUBOXONE) 12-3 MG FILM per film; Place 1 Film under the tongue 2 times daily  Dispense: 30 Film; Refill: 0    2. Tobacco use disorder  Not ready to quit at this time    Return in about 2 weeks (around 1/5/2022) for Follow up, in person.      SUBJECTIVE                                                      CC/HPI:  Bebeto Alvarado is a 31 year old male with PMH PSUD primarily opioids on buprenorphine who presents to the Recovery Clinic for return visit.      Brief History:  Pt was first evaluated in Recovery Clinic on 6/11/21.    Opioids: Pt started using opioids 2009 using opioid pills, then progressed to smoking heroin 2011, daily heroin use began 2015,  then IV use starting 2019.     IV drug use: Yes: potential infection exposure with 8/2021 use  History of overdose: Yes: 2019  Previous treatments : Yes: multiple; most recently admitted to [facility] 8/31/21 and discharged 9/8/21; h/o buprenorphine treatment including XR buprenorphine in 2020  Longest period of sobriety:6-9 months on buprenorphine  Medical complications related to substance use: none known  Hepatitis C Status 10/28/2021 HCV ab nonreactive  HIV Status 10/28/21 HIV ag/ab  nonreactive      Pt was last seen on 12/6/21   A/P at that time was:  1. Opioid use disorder, severe, dependence (H)  Pt endorsing ongoing intermittent use of full opioid agonists when not taking buprenorphine, having difficulty maintaining consistent buprenorphine therapy and clinic follow up.    Recommended again transfer to XR buprenorphine.  Pt declines at this time.   He does have reduced use of full opioids with buprenorphine.    Reviewed aspects of harm reduction including naloxone which pt confirms he has   - Buprenorphine HCl-Naloxone HCl (SUBOXONE) 12-3 MG FILM per film; Place 1 Film under the tongue every morning  Dispense: 15 Film; Refill: 0  - buprenorphine HCl-naloxone HCl (SUBOXONE) 8-2 MG per film; Place 1 Film under the tongue every evening  Dispense: 15 Film; Refill: 0     2. Moderate episode of recurrent major depressive disorder (H)  Refilled bupropion  - buPROPion (WELLBUTRIN XL) 300 MG 24 hr tablet; Take 1 tablet (300 mg) by mouth every morning  Dispense: 30 tablet; Refill: 1      Return in about 2 weeks (around 12/20/2021) for Follow up, in person.      Today, pt states he took buprenorphine 20mg/day most days, and increased to 24mg/day on a couple of occasions due to level of cravings.  He ran out of buprenorphine on 12/18/21.  He states he used heroin/fentanyl on 12/20/21.  He goes on to say he was unable to come to his  appt on 12/20/21 due to having to work unexpectedly.   He states he uses alone.   He also states [facility] and his roommate is aware of where the naloxone is.  Pt states he has told sober house staff of at least some of his ongoing intermittent use.        [place] Prescription Drug Monitoring Program Reviewed:  Yes; as expected        Past Medical History:   Diagnosis Date     Opiate abuse, continuous (H)      Tobacco use      PAST PSYCHIATRIC HISTORY:  Diagnoses- depression  Suicide Attempts: No   Hospitalizations: No     No past  surgical history on file.    Medications:  Buprenorphine HCl-Naloxone HCl (SUBOXONE) 12-3 MG FILM per film, Place 1 Film under the tongue every morning  buprenorphine HCl-naloxone HCl (SUBOXONE) 8-2 MG per film, Place 1 Film under the tongue every evening  buPROPion (WELLBUTRIN XL) 300 MG 24 hr tablet, Take 1 tablet (300 mg) by mouth every morning  naloxone (NARCAN) 4 MG/0.1ML nasal spray, Spray 1 spray (4 mg) into one nostril alternating nostrils once as needed for opioid reversal every 2-3 minutes until assistance arrives    Self Administer Medications: Behavioral Services        Family History   Problem Relation Age of Onset     Diabetes Mother      Hypertension Mother      No Known Problems Father      No Known Problems Brother      Suicide Other        Allergies   Allergen Reactions     Codeine Unknown     Pt has been told.         Social History  Housing status: in a sober house  Employment status: employed  Relationship status: Single   Children: no children       REVIEW OF SYSTEMS:  No other concerns    OBJECTIVE                                                      BP (!) 160/80   Pulse 106        Physical Exam  HENT:      Head: Normocephalic and atraumatic.   Eyes:      General: No scleral icterus.     Conjunctiva/sclera: Conjunctivae normal.   Pulmonary:      Effort: Pulmonary effort is normal.   Neurological:      Mental Status: He is alert and oriented to person, place, and time.      Coordination: Coordination is intact.      Gait: Gait is intact.   Psychiatric:         Attention and Perception: Attention and perception normal.         Speech: Speech normal.         Behavior: Behavior is cooperative.         Thought Content: Thought content does not include suicidal ideation.         Cognition and Memory: Cognition normal.      Comments: Insight and judgement are fair to poor  Mood is neutral, affect is restricted         Labs:    UDS: morphine and THC   *POC urine drug screen does not screen for  Fentanyl    No results found for this or any previous visit (from the past 240 hour(s)).        Patient counseling completed today:  Discussed mechanism of action, potential risks/benefits/side effects of medications and other recommendations above.   Recommend pt keep naloxone in their possession and reviewed other aspects of harm reduction to reduce risk of overdose with return to use.   Recommended avoiding concurrent use of alcohol, benzodiazepines or other sedatives with buprenorphine or other opioids.  Discussed importance of avoiding isolation, building a network of supportive relationships, avoiding people/places/things associated with past use to reduce risk of relapse; including motivational interviewing regarding psychosocial treatment for addiction.     At least 40 min spent in review of medical record,  review, obtaining histories, reviewing symptoms, discussing pt's goals for treatment, counseling noted above.    CLAUDIA CHRISTOPHER MD  [place] [id number] [place] [id number] [phone]

## 2021-12-22 NOTE — NURSING NOTE
[facility]      Rooming information:  Approximate last use of illicit opioids: 12/20/2021  Taking buprenorphine? No As prescribed? No  Number of buprenorphine films/tablets remaining currently: 0  Side effects related to buprenorphine (constipation, dry mouth, sedation?) No   Narcan currently available: Yes  Other recent substance use:    Cannabis   NICOTINE-Yes:   If using nicotine, ready to quit? No    Point of care urine drug screen positive for: morphine and THC  *POC urine drug screen does not screen for Fentanyl      PHQ-2 Score:     PHQ-2 ( 1999 Pfizer) 12/22/2021 12/6/2021   Q1: Little interest or pleasure in doing things 1 1   Q2: Feeling down, depressed or hopeless 1 1   PHQ-2 Score 2 2   PHQ-2 Total Score (12-17 Years)- Positive if 3 or more points; Administer PHQ-A if positive 2 2        If PHQ-2 score of 3 or higher, has Recovery Clinic therapist or provider been notified? N/A    Any current suicidal ideation? No  If yes, has [facility] Clinic therapist or provider been notified? N/A    Primary care provider: NEEL Tariq CNP     Mental health provider: denies (follow up on MH referral if needed)    Insurance needs: active    Housing needs: stable    Contact information up to date? yes    3rd Party Involvement none today (please obtain ANAIS if pt would like to include)    Janet Brice CMA  December 22, 2021  2:13 PM

## 2022-01-01 ENCOUNTER — OFFICE VISIT (OUTPATIENT)
Dept: BEHAVIORAL HEALTH | Facility: CLINIC | Age: 32
End: 2022-01-01
Payer: COMMERCIAL

## 2022-01-01 ENCOUNTER — APPOINTMENT (OUTPATIENT)
Dept: BEHAVIORAL HEALTH | Facility: CLINIC | Age: 32
End: 2022-01-01
Payer: COMMERCIAL

## 2022-01-01 ENCOUNTER — OFFICE VISIT (OUTPATIENT)
Dept: BEHAVIORAL HEALTH | Facility: CLINIC | Age: 32
End: 2022-01-01

## 2022-01-01 ENCOUNTER — TELEPHONE (OUTPATIENT)
Dept: BEHAVIORAL HEALTH | Facility: CLINIC | Age: 32
End: 2022-01-01
Payer: COMMERCIAL

## 2022-01-01 ENCOUNTER — TELEPHONE (OUTPATIENT)
Dept: BEHAVIORAL HEALTH | Facility: CLINIC | Age: 32
End: 2022-01-01

## 2022-01-01 VITALS — SYSTOLIC BLOOD PRESSURE: 137 MMHG | DIASTOLIC BLOOD PRESSURE: 73 MMHG | HEART RATE: 76 BPM

## 2022-01-01 VITALS — DIASTOLIC BLOOD PRESSURE: 58 MMHG | HEART RATE: 82 BPM | SYSTOLIC BLOOD PRESSURE: 108 MMHG

## 2022-01-01 DIAGNOSIS — F11.20 OPIOID USE DISORDER, SEVERE, DEPENDENCE (H): Primary | ICD-10-CM

## 2022-01-01 DIAGNOSIS — F33.1 MODERATE EPISODE OF RECURRENT MAJOR DEPRESSIVE DISORDER (H): ICD-10-CM

## 2022-01-01 DIAGNOSIS — F17.200 TOBACCO USE DISORDER: ICD-10-CM

## 2022-01-01 DIAGNOSIS — M25.562 ACUTE PAIN OF LEFT KNEE: ICD-10-CM

## 2022-01-01 LAB
BUPRENORPHINE UR-MCNC: 268 NG/ML
BUPRENORPHINE UR-MCNC: 6 NG/ML
BUPRENORPHINE UR-MCNC: <5 NG/ML
BUPRENORPHINE UR-MCNC: >1000 NG/ML
NALOXONE UR CFM-MCNC: <100 NG/ML
NALOXONE UR CFM-MCNC: >1000 NG/ML
NORBUPRENORPHINE UR CFM-MCNC: <5 NG/ML
NORBUPRENORPHINE UR CFM-MCNC: POSITIVE NG/ML
NORBUPRENORPHINE UR-MCNC: 8 NG/ML
NORBUPRENORPHINE UR-MCNC: <2 NG/ML

## 2022-01-01 PROCEDURE — 99000 SPECIMEN HANDLING OFFICE-LAB: CPT | Performed by: FAMILY MEDICINE

## 2022-01-01 PROCEDURE — 99214 OFFICE O/P EST MOD 30 MIN: CPT | Performed by: FAMILY MEDICINE

## 2022-01-01 PROCEDURE — 80299 QUANTITATIVE ASSAY DRUG: CPT | Mod: 90 | Performed by: FAMILY MEDICINE

## 2022-01-01 PROCEDURE — 99215 OFFICE O/P EST HI 40 MIN: CPT | Performed by: FAMILY MEDICINE

## 2022-01-01 RX ORDER — BUPROPION HYDROCHLORIDE 300 MG/1
300 TABLET ORAL EVERY MORNING
Qty: 30 TABLET | Refills: 1 | Status: SHIPPED | OUTPATIENT
Start: 2022-01-01

## 2022-01-01 RX ORDER — BUPROPION HYDROCHLORIDE 300 MG/1
300 TABLET ORAL EVERY MORNING
Qty: 30 TABLET | Refills: 1 | Status: SHIPPED | OUTPATIENT
Start: 2022-01-01 | End: 2022-01-01

## 2022-01-01 RX ORDER — IBUPROFEN 800 MG/1
800 TABLET, FILM COATED ORAL EVERY 8 HOURS PRN
Qty: 90 TABLET | Refills: 0 | Status: SHIPPED | OUTPATIENT
Start: 2022-01-01

## 2022-01-01 RX ORDER — BUPRENORPHINE AND NALOXONE 12; 3 MG/1; MG/1
1 FILM, SOLUBLE BUCCAL; SUBLINGUAL 2 TIMES DAILY
Qty: 30 FILM | Refills: 0 | Status: SHIPPED | OUTPATIENT
Start: 2022-01-01

## 2022-01-01 RX ORDER — BUPRENORPHINE AND NALOXONE 12; 3 MG/1; MG/1
1 FILM, SOLUBLE BUCCAL; SUBLINGUAL 2 TIMES DAILY
Qty: 30 FILM | Refills: 0 | Status: SHIPPED | OUTPATIENT
Start: 2022-01-01 | End: 2022-01-01

## 2022-01-06 NOTE — PROGRESS NOTES
[facility] Recovery [facility] Follow Up    ASSESSMENT/PLAN                                                      1. Opioid use disorder, severe, dependence (H)  We had a long discussion regarding his ongoing use.  Discussed importance of taking buprenorphine as prescribed.  Jordan discussion regarding risk of overdose with continued use and he verbalizes understanding, also citing risks of dying from overdose if using alone.  New script for Narcan provided.  Encouraged him to consider Sublocade.  He is concerned about Sublocade because he relapsed while on Sublocade.  Discussed that he may need to be on buprenorphine 300mg monthly injections (rather than decreasing to 100mg after first 2 injections). He will consider.  We reflected on the recent loss of his friend to opioid overdose.  I encouraged him to re-consider treatment program.  Overall, his pattern shows decreased use while on buprenorphine so certainly there is a level of harm reduction at play while working towards goal of complete abstinence.  He continue to engage with clinic and have open discussions which is of benefit to him.     - Buprenorphine & Metabolite Screen; Future  - naloxone (NARCAN) 4 MG/0.1ML nasal spray; Spray 1 spray (4 mg) into one nostril alternating nostrils once as needed for opioid reversal every 2-3 minutes until assistance arrives  Dispense: 0.2 mL; Refill: 11  - Buprenorphine HCl-Naloxone HCl (SUBOXONE) 12-3 MG FILM per film; Place 1 Film under the tongue 2 times daily  Dispense: 30 Film; Refill: 0  - Buprenorphine & Metabolite Screen    2. Tobacco use disorder  He is not ready to quit at this time.  Will continue to discuss.      Follow-up in 2 weeks, sooner if needed.      SUBJECTIVE                                                          CC/HPI:  Bebeto Alvarado is a 31 year old male with PMHx of PSUD (primarily opioids) who presents to the Recovery Clinic for follow up regarding treatment of opioid use disorder.       First Recovery Clinic visit:  6/11/21    Last date of illicit opioid use: 1/3/21    Brief history of use:  Began using opioids in 2009.  Started using pills and progressed to smoking heroin in 2011.  Daily heroin use began in 2015 and progressed to IV use in 2019.      IV drug use: Yes: potential infection exposure with 8/2021 use  History of overdose: Yes: 2019  Previous treatments : Yes: multiple; most recently admitted to [facility] 8/31/21 and discharged 9/8/21; h/o buprenorphine treatment including XR buprenorphine in 2020  Longest period of sobriety:6-9 months on buprenorphine  Medical complications related to substance use: none known  Hepatitis C Status 10/28/2021 HCV ab nonreactive  HIV Status 10/28/21 HIV ag/ab nonreactive    Most recent Recovery Clinic visit: 12/22/21   Important points and recommendations last visit:  1. Opioid use disorder, severe, dependence (H)  Pt reporting continued intermittent use of heroin/fentanyl IV, using alone when he uses.  Describes advancing buprenorphine dose to 24mg/day during time he had buprenorphine due to level of cravings.   Increase buprenorphine to 24mg/day  Recommend transfer to XR buprenorphine, pt continues to decline this.   Recommend he schedule intake at [place] to facilitate transfer to methadone if he continues to have uncontrolled symptoms with buprenorphine 24mg/day.    Reiterated importance of not using alone, having naloxone available due to risk of opioid overdose and death  - Buprenorphine HCl-Naloxone HCl (SUBOXONE) 12-3 MG FILM per film; Place 1 Film under the tongue 2 times daily  Dispense: 30 Film; Refill: 0     2. Tobacco use disorder  Not ready to quit at this time    Today, patient reports more consistent use of buprenorphine since last visit.  He has not used any IV opioids.  Reports took 1 tablet of Vicodin on 1/3, did not feel high.  Finds that spending time with his family over the holidays helped him not use.  Discussed boredom as  a trigger for use.  Recently lost a close friend to overdose - he was living in a sober house and was found dead in the bathroom.  Tavo realizes this could be him if he continues using.  He continues to live in a sober house, attending meetings three times per week.  Now thinking about getting a new sponsor.  Going to [employer] weekly.  Requests new script for Narcan.      [place] Prescription Drug Monitoring Program Reviewed:  Yes; last filled 12/22/21 (#30 film, 15 days)      Medications:  Buprenorphine HCl-Naloxone HCl (SUBOXONE) 12-3 MG FILM per film, Place 1 Film under the tongue 2 times daily  buPROPion (WELLBUTRIN XL) 300 MG 24 hr tablet, Take 1 tablet (300 mg) by mouth every morning  naloxone (NARCAN) 4 MG/0.1ML nasal spray, Spray 1 spray (4 mg) into one nostril alternating nostrils once as needed for opioid reversal every 2-3 minutes until assistance arrives    Self Administer Medications: Behavioral Services        Allergies   Allergen Reactions     Codeine Unknown     Pt has been told.       PMH, PSH, FamHx reviewed    Social History  Housing status: in a sober house  Employment status: employed  Relationship status: Single   Children: no children    OBJECTIVE                                                      /73   Pulse 76     Physical Exam  Vitals reviewed.   Constitutional:       General: He is not in acute distress.     Appearance: Normal appearance. He is not diaphoretic.   HENT:      Nose: No congestion or rhinorrhea.   Eyes:      General: No scleral icterus.     Conjunctiva/sclera: Conjunctivae normal.   Cardiovascular:      Rate and Rhythm: Normal rate.   Pulmonary:      Effort: Pulmonary effort is normal. No respiratory distress.   Skin:     General: Skin is warm and dry.      Coloration: Skin is not jaundiced or pale.   Neurological:      General: No focal deficit present.      Mental Status: He is alert and oriented to person, place, and time.      Gait: Gait normal.   Psychiatric:          Mood and Affect: Mood normal.         Behavior: Behavior normal.         Thought Content: Thought content normal.      Comments: Insight and judgment fair         Labs:    UDS: buprenorphine and oxycodone  *POC urine drug screen does not screen for Fentanyl    No results found for this or any previous visit (from the past 240 hour(s)).      Patient counseling completed today:  Discussed mechanism of action, potential risks/benefits/side effects of medications and other recommendations above.  Recommend pt keep naloxone in their possession and reviewed other aspects of harm reduction to reduce risk of overdose with return to use.   Recommended avoiding concurrent use of alcohol, benzodiazepines or other sedatives with buprenorphine or other opioids.  Discussed importance of avoiding isolation, building a network of supportive relationships, avoiding people/places/things associated with past use to reduce risk of relapse; including motivational interviewing regarding psychosocial treatment for addiction.     I spent a total of 44 minutes on the day of the visit.   Time spent doing chart review, history and exam, documentation and further activities per the note    Jenn Merritt [place] [address] [place] [id number] [phone]

## 2022-01-06 NOTE — NURSING NOTE
[facility]      Rooming information:  Approximate last use of illicit opioids: 1/3/2021  Taking buprenorphine? Yes As prescribed? Yes:   Number of buprenorphine films/tablets remaining currently: 0  Side effects related to buprenorphine (constipation, dry mouth, sedation?) No   Narcan currently available: Yes, would like more refills sent to our pharmacy   Other recent substance use:    Denies  NICOTINE-Yes:   If using nicotine, ready to quit? No    Point of care urine drug screen positive for: buprenorphine and oxycodone  *POC urine drug screen does not screen for Fentanyl      PHQ-2 Score:     PHQ-2 ( 1999 Pfizer) 1/6/2022 12/22/2021   Q1: Little interest or pleasure in doing things 1 1   Q2: Feeling down, depressed or hopeless 0 1   PHQ-2 Score 1 2   PHQ-2 Total Score (12-17 Years)- Positive if 3 or more points; Administer PHQ-A if positive 1 2        If PHQ-2 score of 3 or higher, has Recovery Clinic therapist or provider been notified? N/A    Any current suicidal ideation? No  If yes, has Recovery Clinic therapist or provider been notified? N/A    Primary care provider: NEEL Tariq CNP     Mental health provider: denies (follow up on MH referral if needed)    Insurance needs: active    Housing needs: stable    Contact information up to date? yes    3rd Party Involvement none today (please obtain ANAIS if pt would like to include)    Janet Brice CMA  January 6, 2022  11:03 AM

## 2022-01-06 NOTE — PROGRESS NOTES
[facility]    Peer  met with Bebeto Alvarado in the Recovery Clinic to introduce himself, detail services provided and discuss current status of recovery. Pt appeared alert, oriented and open to feedback during our discussion.     Pt states limited use since last meeting.  PRC and pt discussed factors surrounding the usage which involve ppl places and things associated with our daily life.  Pt states awareness of these factors and states making a conscientious effort to reduce and eliminate them.     PRC welcomes contact form pt for support and resources, and agreement is to meet again during an upcoming  appoint.     Patient Goal: To utilize suboxone assisted treatment for sobriety and long term recovery.     Goal Progress:   Ongoing.    Key Risk Factors to Recovery:   PRC encourages being aware of risk factors that can lead to re-use which include avoiding isolation, avoiding triggers and managing cravings in a healthy manner. being open and willing to acceptance and change on a daily basis.  PRC encourages pt to establish a sober network calling tree to reach out to when needed.  Continue to practice honesty with ourselves and trusted support person(s).   PRC encourages regular attendance at recovery based meetings as well as finding a sponsor for mentoring and accountability.   PRC encourages consideration of of other services such as counseling for mental health issues which can correlate with our substance use.      Support Needs:   Ongoing care, support and resources for opioid substance use disorder.     Follow up:   PRC provided pt with his contact information for support and resource needs.  PRC and pt agree to meet during an upcoming  appointment.     [facility]  [address], [address]   [place], [place], [address]  Clinic Phone: [phone]  Clinic Fax: [phone]  Peer  phone: [phone]    Open Monday -  Friday  9:00am-4:00pm  Walk in hours: 9am-3pm    Mayur Payne  January 6, 2022  12:24 PM

## 2022-01-09 NOTE — TELEPHONE ENCOUNTER
"Medication was entered/ordered as \"historical\" at 12/6/21 visit, so no Rx/order was submitted to pharmacy. Please send the \"#30 tabs with 1 refill\" Rx indicated in your visit notes. Thanks!  "

## 2022-01-10 NOTE — TELEPHONE ENCOUNTER
1. Moderate episode of recurrent major depressive disorder (H)    - buPROPion (WELLBUTRIN XL) 300 MG 24 hr tablet; Take 1 tablet (300 mg) by mouth every morning  Dispense: 30 tablet; Refill: 1

## 2022-01-21 NOTE — NURSING NOTE
[facility]      Rooming information:  Approximate last use of illicit opioids: few days ago  Taking buprenorphine? Yes:  As prescribed? Yes:   Number of buprenorphine films/tablets remaining currently: 0  Side effects related to buprenorphine (constipation, dry mouth, sedation?) No   Narcan currently available: Yes  Other recent substance use:    Denies  NICOTINE-Yes:   If using nicotine, ready to quit? No    Point of care urine drug screen positive for: buprenorphine and oxycodone  *POC urine drug screen does not screen for Fentanyl  *Observed by Janet HOOD and Gus RN    PHQ-2 Score:     PHQ-2 ( 1999 Pfizer) 1/21/2022 1/6/2022   Q1: Little interest or pleasure in doing things 0 1   Q2: Feeling down, depressed or hopeless 1 0   PHQ-2 Score 1 1   PHQ-2 Total Score (12-17 Years)- Positive if 3 or more points; Administer PHQ-A if positive 1 1        If PHQ-2 score of 3 or higher, has Recovery Clinic therapist or provider been notified? N/A    Any current suicidal ideation? No  If yes, has Recovery Clinic therapist or provider been notified? N/A    Primary care provider: NEEL Tariq CNP     Mental health provider: denies (follow up on MH referral if needed)    Insurance needs: active    Housing needs: stable    Contact information up to date? yes    3rd Party Involvement none today (please obtain ANAIS if pt would like to include)    Janet Brice CMA  January 21, 2022  2:12 PM

## 2022-01-22 NOTE — PROGRESS NOTES
[facility] Return Visit    ASSESSMENT/PLAN                                                    1. Opioid use disorder, severe, dependence (H)  Pt reporting one use of full opioid agonist since his visit 1/6.  He described taking hydrocodone, though UDS +oxycodone today.  States he took this due to pain in L knee recently.  He states he has not used heroin since 12/2021 following overdose death of a friend.    He also reports he has taken buprenorphine 24mg/day as prescribed since 1/6/22.   Studies from previous visits showed low or unmeasurable norbuprenorphine levels.  UDS today was observed by staff.   F/u buprenorphine/norbuprenorphine levels from today.    Continue buprenorphine 24mg/day.  Pt declines transfer to methadone or XR buprenorphine.   Pt confirmed he has naloxone.   - Buprenorphine HCl-Naloxone HCl (SUBOXONE) 12-3 MG FILM per film; Place 1 Film under the tongue 2 times daily  Dispense: 30 Film; Refill: 0  - Buprenorphine & Metabolite Screen; Future  - Buprenorphine & Metabolite Screen    2. Acute pain of left knee  rx ibuprofen as noted  - ibuprofen (ADVIL/MOTRIN) 800 MG tablet; Take 1 tablet (800 mg) by mouth every 8 hours as needed for moderate pain  Dispense: 90 tablet; Refill: 0    3. Moderate episode of recurrent major depressive disorder (H)  Continue bupropion as noted  - buPROPion (WELLBUTRIN XL) 300 MG 24 hr tablet; Take 1 tablet (300 mg) by mouth every morning  Dispense: 30 tablet; Refill: 1    4. Tobacco use disorder  Not ready to quit at this time    Return in about 2 weeks (around 2/4/2022) for Follow up, in person.      SUBJECTIVE                                                      CC/HPI:  Bebeto Alvarado is a 31 year old male with PMH PSUD primarily opioids on buprenorphine who presents to the Recovery Clinic for return visit.      Brief History:  Pt was first evaluated in Recovery Clinic on 6/11/21.    Opioids: Pt started using opioids 2009 using  opioid pills, then progressed to smoking heroin 2011, daily heroin use began 2015,  then IV use starting 2019.     IV drug use: Yes: potential infection exposure with 8/2021 use  History of overdose: Yes: 2019  Previous treatments : Yes: multiple; most recently admitted to [facility] 8/31/21 and discharged 9/8/21; h/o buprenorphine treatment including XR buprenorphine in 2020  Longest period of sobriety:6-9 months on buprenorphine  Medical complications related to substance use: none known  Hepatitis C Status 10/28/2021 HCV ab nonreactive  HIV Status 10/28/21 HIV ag/ab nonreactive      Pt was last seen on 1/6/21   A/P at that time was:  1. Opioid use disorder, severe, dependence (H)  We had a long discussion regarding his ongoing use.  Discussed importance of taking buprenorphine as prescribed.  Jordan discussion regarding risk of overdose with continued use and he verbalizes understanding, also citing risks of dying from overdose if using alone.  New script for Narcan provided.  Encouraged him to consider Sublocade.  He is concerned about Sublocade because he relapsed while on Sublocade.  Discussed that he may need to be on buprenorphine 300mg monthly injections (rather than decreasing to 100mg after first 2 injections). He will consider.  We reflected on the recent loss of his friend to opioid overdose.  I encouraged him to re-consider treatment program.  Overall, his pattern shows decreased use while on buprenorphine so certainly there is a level of harm reduction at play while working towards goal of complete abstinence.  He continue to engage with clinic and have open discussions which is of benefit to him.     - Buprenorphine & Metabolite Screen; Future  - naloxone (NARCAN) 4 MG/0.1ML nasal spray; Spray 1 spray (4 mg) into one nostril alternating nostrils once as needed for opioid reversal every 2-3 minutes until assistance arrives  Dispense: 0.2 mL; Refill: 11  - Buprenorphine HCl-Naloxone HCl (SUBOXONE)  "12-3 MG FILM per film; Place 1 Film under the tongue 2 times daily  Dispense: 30 Film; Refill: 0  - Buprenorphine & Metabolite Screen     2. Tobacco use disorder  He is not ready to quit at this time.  Will continue to discuss.       Follow-up in 2 weeks, sooner if needed.       Today, pt states he has continued to take buprenorphine 24mg/day until (appropriately) running out 1/20/22.  He denies any heroin use since 12/2021, citing his friend's overdose death as a motivating factor. He does report taking \"Vicodin\" ~1/18/22 to treat pain in his L knee.  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  He continues to live in a sober house.  He states he does have 2 friends who are aware of his continued intermittent use.  Pt does endorse sadness related to his friend's death.          [place] Prescription Drug Monitoring Program Reviewed:  Yes; as expected        Past Medical History:   Diagnosis Date     Opiate abuse, continuous (H)      Tobacco use      PAST PSYCHIATRIC HISTORY:  Diagnoses- depression  Suicide Attempts: No   Hospitalizations: No     No past surgical history on file.    Medications:  naloxone (NARCAN) 4 MG/0.1ML nasal spray, Spray 1 spray (4 mg) into one nostril alternating nostrils once as needed for opioid reversal every 2-3 minutes until assistance arrives (Patient not taking: Reported on 1/21/2022)    Self Administer Medications: Behavioral Services        Family History   Problem Relation Age of Onset     Diabetes Mother      Hypertension Mother      No Known Problems Father      No Known Problems Brother      Suicide Other        Allergies   Allergen Reactions     Codeine Unknown     Pt has been told.         Social History  Housing status: in a sober house  Employment status: employed  Relationship status: Single   Children: no children       REVIEW OF SYSTEMS:  No other concerns    OBJECTIVE                                                      /58   " Pulse 82        Physical Exam  HENT:      Head: Normocephalic and atraumatic.   Eyes:      General: No scleral icterus.     Conjunctiva/sclera: Conjunctivae normal.   Pulmonary:      Effort: Pulmonary effort is normal.   Neurological:      Mental Status: He is alert and oriented to person, place, and time.      Coordination: Coordination is intact.      Gait: Gait is intact.   Psychiatric:         Attention and Perception: Attention and perception normal.         Speech: Speech normal.         Behavior: Behavior is cooperative.         Thought Content: Thought content does not include suicidal ideation.         Cognition and Memory: Cognition normal.      Comments: Insight and judgement are fair to poor  Mood is neutral, affect is restricted         Labs:    UDS 1/21/22: buprenorphine and oxycodone   *POC urine drug screen does not screen for Fentanyl    No results found for this or any previous visit (from the past 240 hour(s)).        Patient counseling completed today:  Discussed mechanism of action, potential risks/benefits/side effects of medications and other recommendations above.   Recommend pt keep naloxone in their possession and reviewed other aspects of harm reduction to reduce risk of overdose with return to use.   Recommended avoiding concurrent use of alcohol, benzodiazepines or other sedatives with buprenorphine or other opioids.  Discussed importance of avoiding isolation, building a network of supportive relationships, avoiding people/places/things associated with past use to reduce risk of relapse; including motivational interviewing regarding psychosocial treatment for addiction.     At least 30 min spent in review of medical record,  review, obtaining histories, reviewing symptoms, discussing pt's goals for treatment, counseling noted above.    CLAUDIA CHRISTOPHER MD  [place] [id number] [place] [id number] [phone]

## 2022-01-27 NOTE — TELEPHONE ENCOUNTER
I attempted to reach pt to discuss repeated results of urine toxicology that are consistent with him not taking buprenorphine as he stated/as prescribed.  This is in the setting of h/o repeated negative UDS for buprenorphine.  There was no answer, left VM to call back.   If he calls back I am happy to talk to him about this.     I would like to refer him to an opioid treatment program for observed daily dosing of buprenorphine or methadone.  Recovery Clinic cannot continue to prescribe buprenorphine as there is strong evidence he is not taking it, as well as the fact he has had continued use with office based opioid treatment.      The numbers for 3 potential programs are listed below      [facility]     [facility] [phone]    [facility] [phone]      Recent Results (from the past 240 hour(s))   Buprenorphine & Metabolite Screen    Collection Time: 01/21/22  3:14 PM   Result Value Ref Range    Buprenorphine, Urn, Quant 268 ng/mL    Norbuprenorphine, Urn, Quant <2 ng/mL    Buprenorphine Gluc, Urn, Quant <5 ng/mL    Norbuprenorphine Gluc, Urn, Quant <5 ng/mL    Naloxone, Urn, Quant <100 ng/mL

## 2022-02-18 NOTE — TELEPHONE ENCOUNTER
Patient presented at the Recovery Clinic. Informed Tavo of Dr. Fisher's recommendation for a daily dosing opioid treatment program (OTP). Patient verbalized understanding. Below resources provided patient.    Methadone Provider Resources   This is a resource list and not intended to be an endorsement of any provider.     [facility]   [address] [facility]   [address]   [place] [address]   [phone]     [facility]   Addiction Medicine Clinic   [address] [place] [address]   [phone]     [facility]   multiple locations throughout [place]   [phone]      [facility]   multiple locations   [phone]      [facility]   multiple locations   [phone]      [facility]   Mental Health Intake   [address]   [place], [place] [address] [phone]     Veena Tadeo RN on 2/18/2022 at 11:33 AM

## 2022-11-29 NOTE — PROGRESS NOTES
"PRC met with pt in the Recovery [facility] to discuss recovery status during a scheduled visit for suboxone. Pt appeared alert oriented and open to feedback during our meeting.     Pt states he has not been using since last visit. 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  The job lead for was provided to him by the sober house owner.  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   Discussion over placng money earned into a bank account vs keeping \"idle cash\" on hand in our pockets occurred.  Pt states sober house living is fine, but feels the  expects more of him than some of the other residents in the house. PRC and pt discussed notifying the house owners of the situation which pt has already done. Pt is looking forward to saving some money and eventually moving to an apt. but states there is no timetable for this move.    PRC and pt discussed also discussed attending recovery meetings and [employer] - which are required by the sober house owners. He attends [facility] [employer] and [employer] and  [employer] [facility] in [facility] for meetings.  Discussion over doing so asa key to recovery foundation occurred.     Pt has no specific support or resource needs from [facility] outside of today's meeting.  PRC and pt agree to meet during an upcoming visit to the        [facility] on 10/28/2021 at 3:59 PM    "
none

## 2024-07-11 NOTE — GROUP NOTE
Group Therapy Documentation    PATIENT'S NAME: Bebeto Alvarado  MRN:   [id number]  :   1990  ACCT. NUMBER: [id number]  DATE OF SERVICE: 21  START TIME: 12:30 PM  END TIME:  1:30 PM  FACILITATOR(S): Zachary Eckert LADC; Aura Mccoy; Remberto Cobos  TOPIC: BEH Group Therapy  Number of patients attending the group:  9  Group Length:  1 Hour    Group Therapy Type: Recovery strategies    Summary of Group / Topics Discussed:    Recovery Principles      Group Attendance:  Attended group session    Patient's response to the group topic/interactions:  cooperative with task    Patient appeared to be Attentive.        Client specific details:  Tavo attended partial afternoon group. Patient took part in a discussion on recovery, left to go to the bathroom, and did not return to group.     
Group Therapy Documentation    PATIENT'S NAME: Bebeto Alvarado  MRN:   [id number]  :   1990  ACCT. NUMBER: [id number]  DATE OF SERVICE: 21  START TIME:  9:00 AM  END TIME: 11:00 AM  FACILITATOR(S): Remberto Cobos Daniel Ray, LADC  TOPIC: BEH Group Therapy  Number of patients attending the group:  9  Group Length:  2 Hours    Group Therapy Type: Recovery strategies    Summary of Group / Topics Discussed:    Recovery Principles, Relationship/socialization, Balanced lifestyle, Trauma informed care, Disease of addiction, Leisure explorations/use of leisure time, Relapse prevention, and Self-care activities      Group Attendance:  Attended group session    Patient's response to the group topic/interactions:  cooperative with task    Patient appeared to be Attentive and Engaged.        Client specific details:  Tavo attended morning group.  He checked in, and participated in the group discussion.        
Psychoeducation Group Documentation    PATIENT'S NAME: Bebeto Alvarado  MRN:   [id number]  :   1990  ACCT. NUMBER: [id number]  DATE OF SERVICE: 21  START TIME:  3:00 PM  END TIME:  4:00 PM  FACILITATOR(S): Lisa Christianson ADC-T; Viktor Castillo LADC; Sofia Cummins LADC  TOPIC: BEH Pyschoeducation  Number of patients attending the group:  21  Group Length:  1 Hours    Skills Group Therapy Type: Healthy behaviors development    Summary of Group / Topics Discussed:    Balanced lifestyle skills    Group Attendance:  Attended group session    Patient's response to the group topic/interactions:  cooperative with task    Patient appeared to be Attentive and Engaged.         Client specific details: Pt attended Dr. Coburn's lecture on how healthy nutrition affects brain health and chemical abstinence.  
Patient/Caregiver provided printed discharge information.

## 2024-10-30 NOTE — PROGRESS NOTES
[facility] Addiction Medicine    A/P                                                    ASSESSMENT/PLAN  Diagnoses and all orders for this visit:  Opioid use disorder, severe, dependence (H)  -     buprenorphine HCl-naloxone HCl (SUBOXONE) 8-2 MG per film; Place 1 Film under the tongue 2 times daily  Moderate episode of recurrent major depressive disorder (H)  -     buPROPion (WELLBUTRIN XL) 300 MG 24 hr tablet; Take 1 tablet (300 mg) by mouth every morning after completing 150 mg prescription    Orders Placed This Encounter   Medications     buprenorphine HCl-naloxone HCl (SUBOXONE) 8-2 MG per film     Sig: Place 1 Film under the tongue 2 times daily     Dispense:  20 Film     Refill:  0     NADEAN: [id number]; Please substitute for covered alternative per insurance request.     buPROPion (WELLBUTRIN XL) 300 MG 24 hr tablet     Sig: Take 1 tablet (300 mg) by mouth every morning after completing 150 mg prescription     Dispense:  30 tablet     Refill:  1       Problem list updated Oct 22, 2021   No problems updated.      - Continue subxone 16mg daily, helping prevent use and significantly reduce cravings  - Prefers walkin appointments  - Defers lab testing again today  - Continue wellbutrin, no changes. Disucssed lower dose given occasinal jitters, but he prefers to stay the same      PDMP Review       Value Time User    State PDMP site checked  Yes 10/22/2021  2:33 PM Yoseph Melo MD            RTC  1 week-10d      Counseled the patient on the importance of having a recovery program in addition to medication to manage recovery.  Components include avoiding isolating, having willingness to change, avoiding triggers and managing cravings. Encouraged having some type of sober network and practicing honesty with trusted support person(s). Encouraged other services such as counseling, 12 step or other self-help organizations.      Opioid warning reviewed.  Risk of overdose following a period of  abstinence due to decrease tolerance was discussed including risk of death.  Strongly recommended abstain from alcohol, benzodiazepines, THC, opioids and other drugs of abuse.  Increased risk of return to opioid use after use of these substances discussed.  Increased risk of overdose/death with use of other substances particularly benzodiazepines/alcohol reviewed.        SUBJECTIVE                                                    Bebeto Alvarado is a 30 year old male who presents to clinic today for follow up    Visit performed In Person, face-to-face      Recent HPI Details:  CC/HPI:  Bebeto Alvarado is a 30 year old male with PMH PSUD primarily opioids on buprenorphine who presents to the Recovery Clinic for return visit.       Brief History:  Pt was first evaluated in Recovery Clinic on 6/11/21.    Opioids: Pt started using opioids 2009 using opioid pills, then progressed to smoking heroin 2011, daily heroin use began 2015,  then IV use starting 2019.     IV drug use: Yes: potential infection exposure with 8/2021 use  History of overdose: Yes: 2019  Previous treatments : Yes: multiple; most recently admitted to [facility] 8/31/21 and discharged 9/8/21; h/o buprenorphine treatment including XR buprenorphine in 2020  Longest period of sobriety:6-9 months on buprenorphine  Medical complications related to substance use: none known  Hepatitis C Status  8/31/2021 HCV ab nonreactive; needs re-screening after potential exposure 8/2021; pt declined 10/11/21  HIV Status 8/31/21 HIV ag/ab nonreactive;  needs re-screening after potential exposure 8/2021; pt declined 10/11/21        I last met with pt on 10/11/21  A/P at that time was:  1. Opioid use disorder, severe, dependence (H)  Pt denies heroin/fentanyl use since 9/25/21, states he took hydrocodone from a friend for pain associated with recent dog bite on his leg on 10/8/21.    Continues to take inconsistent doses of buprenorphine with inconsistent  follow up.   Encouraged regular use of buprenorphine 16mg/day.   Pt has contact information for infusion center if he chooses to follow recommendation of returning to XR buprenorphine.   - buprenorphine HCl-naloxone HCl (SUBOXONE) 8-2 MG per film; Place 1 Film under the tongue 2 times daily  Dispense: 20 Film; Refill: 0     2. IVDU (intravenous drug user)  Recommended repeat HIV and HCV screening again today due to potential exposures 8/2021, pt again declined but agreed to next visit     3. Tobacco use disorder  Not ready to quit at this tme    TODAY'S VISIT  HPI Oct 22, 2021  - Mood positive  - Used once, vicodin 2 days ago. Denies other use  - Ran out of subxone 3 days ago; having withdrawals currently, generalized without any specific concerns today  - Minimal cravings on suboxone; no substance use while on maintenance      OBJECTIVE                                                    PHYSICAL EXAM:  BP (!) 157/74   Pulse 108     GENERAL: healthy, alert and no distress  EYES: Eyes grossly normal to inspection, PERRL and conjunctivae and sclerae normal  RESP: No respiratory distress  MENTAL STATUS EXAM  Appearance/Behavior: No appearant distress  Speech: Normal  Mood/Affect: normal affect  Insight: Adequate    LAB  No results found for any visits on 10/22/21.   Reviewed labs from [facility] - see note for details. Pertinent concerns addressed above      HISTORY                                                    Problem list reviewed & adjusted, as indicated.  Patient Active Problem List   Diagnosis     Attention deficit disorder     Cannabis abuse     Opioid dependence, uncomplicated (H)     History of heroin abuse (H)     Opioid use disorder, moderate, dependence (H)     Chemical dependency (H)         MEDICATION LIST (prior to visit)  naloxone (NARCAN) 4 MG/0.1ML nasal spray, Spray 1 spray (4 mg) into one nostril alternating nostrils once as needed for opioid reversal every 2-3 minutes until assistance  arrives (Patient not taking: Reported on 10/11/2021)    Self Administer Medications: Behavioral Services        MEDICATION LIST (after visit)  Current Outpatient Medications   Medication     buprenorphine HCl-naloxone HCl (SUBOXONE) 8-2 MG per film     buPROPion (WELLBUTRIN XL) 300 MG 24 hr tablet     naloxone (NARCAN) 4 MG/0.1ML nasal spray     No current facility-administered medications for this visit.     Facility-Administered Medications Ordered in Other Visits   Medication     Self Administer Medications: Behavioral Services         Allergies   Allergen Reactions     Codeine Unknown     Pt has been told.       Additional MDM Details:  none    Yoseph Melo MD  [facility]  [phone]     The patient is a 23y Male complaining of